# Patient Record
Sex: FEMALE | Race: BLACK OR AFRICAN AMERICAN | NOT HISPANIC OR LATINO | Employment: FULL TIME | URBAN - METROPOLITAN AREA
[De-identification: names, ages, dates, MRNs, and addresses within clinical notes are randomized per-mention and may not be internally consistent; named-entity substitution may affect disease eponyms.]

---

## 2017-09-21 ENCOUNTER — OFFICE VISIT (OUTPATIENT)
Dept: FAMILY MEDICINE | Facility: CLINIC | Age: 42
End: 2017-09-21
Payer: MEDICAID

## 2017-09-21 VITALS
HEIGHT: 68 IN | TEMPERATURE: 98 F | RESPIRATION RATE: 20 BRPM | DIASTOLIC BLOOD PRESSURE: 84 MMHG | WEIGHT: 247 LBS | SYSTOLIC BLOOD PRESSURE: 132 MMHG | HEART RATE: 80 BPM | BODY MASS INDEX: 37.44 KG/M2

## 2017-09-21 DIAGNOSIS — E66.9 OBESITY, CLASS II, BMI 35-39.9, ISOLATED: ICD-10-CM

## 2017-09-21 DIAGNOSIS — Z79.4 TYPE 2 DIABETES MELLITUS WITH HYPERGLYCEMIA, WITH LONG-TERM CURRENT USE OF INSULIN: Primary | ICD-10-CM

## 2017-09-21 DIAGNOSIS — E89.0 POSTABLATIVE HYPOTHYROIDISM: ICD-10-CM

## 2017-09-21 DIAGNOSIS — E11.65 TYPE 2 DIABETES MELLITUS WITH HYPERGLYCEMIA, WITH LONG-TERM CURRENT USE OF INSULIN: Primary | ICD-10-CM

## 2017-09-21 DIAGNOSIS — J30.89 CHRONIC NONSEASONAL ALLERGIC RHINITIS DUE TO POLLEN: ICD-10-CM

## 2017-09-21 DIAGNOSIS — R07.1 CHEST PAIN ON BREATHING: ICD-10-CM

## 2017-09-21 DIAGNOSIS — E11.42 DIABETIC POLYNEUROPATHY ASSOCIATED WITH TYPE 2 DIABETES MELLITUS: ICD-10-CM

## 2017-09-21 DIAGNOSIS — Z00.00 WELL ADULT EXAM: ICD-10-CM

## 2017-09-21 DIAGNOSIS — G57.11 MERALGIA PARESTHETICA OF RIGHT SIDE: ICD-10-CM

## 2017-09-21 DIAGNOSIS — F41.9 ANXIETY: ICD-10-CM

## 2017-09-21 DIAGNOSIS — I10 ESSENTIAL HYPERTENSION: ICD-10-CM

## 2017-09-21 PROBLEM — E66.812 OBESITY, CLASS II, BMI 35-39.9, ISOLATED: Status: ACTIVE | Noted: 2017-09-21

## 2017-09-21 PROCEDURE — 3008F BODY MASS INDEX DOCD: CPT | Mod: ,,, | Performed by: FAMILY MEDICINE

## 2017-09-21 PROCEDURE — 99386 PREV VISIT NEW AGE 40-64: CPT | Mod: 25,,, | Performed by: FAMILY MEDICINE

## 2017-09-21 RX ORDER — DICLOFENAC SODIUM 75 MG/1
75 TABLET, DELAYED RELEASE ORAL 2 TIMES DAILY
COMMUNITY
End: 2017-09-21 | Stop reason: ALTCHOICE

## 2017-09-21 RX ORDER — AMITRIPTYLINE HYDROCHLORIDE 25 MG/1
25 TABLET, FILM COATED ORAL NIGHTLY PRN
COMMUNITY
End: 2017-09-21 | Stop reason: ALTCHOICE

## 2017-09-21 RX ORDER — HYDROXYZINE HYDROCHLORIDE 25 MG/1
25 TABLET, FILM COATED ORAL 3 TIMES DAILY
COMMUNITY
End: 2017-10-30

## 2017-09-21 RX ORDER — GABAPENTIN 400 MG/1
400 CAPSULE ORAL 3 TIMES DAILY
COMMUNITY
End: 2017-09-21 | Stop reason: ALTCHOICE

## 2017-09-21 RX ORDER — FLUTICASONE PROPIONATE 50 MCG
1 SPRAY, SUSPENSION (ML) NASAL 2 TIMES DAILY PRN
Qty: 15 G | Refills: 11 | Status: SHIPPED | OUTPATIENT
Start: 2017-09-21 | End: 2018-06-11 | Stop reason: ALTCHOICE

## 2017-09-21 RX ORDER — METFORMIN HYDROCHLORIDE 500 MG/1
500 TABLET ORAL 2 TIMES DAILY WITH MEALS
Qty: 60 TABLET | Refills: 11 | Status: SHIPPED | OUTPATIENT
Start: 2017-09-21 | End: 2018-06-11 | Stop reason: SDUPTHER

## 2017-09-21 RX ORDER — LEVOTHYROXINE SODIUM 200 UG/1
200 TABLET ORAL DAILY
Qty: 30 TABLET | Refills: 11 | Status: SHIPPED | OUTPATIENT
Start: 2017-09-21 | End: 2018-06-11 | Stop reason: CLARIF

## 2017-09-21 RX ORDER — METFORMIN HYDROCHLORIDE 500 MG/1
500 TABLET ORAL 2 TIMES DAILY WITH MEALS
COMMUNITY
End: 2017-09-21 | Stop reason: SDUPTHER

## 2017-09-21 RX ORDER — MELOXICAM 7.5 MG/1
7.5 TABLET ORAL DAILY
Qty: 30 TABLET | Refills: 11 | Status: SHIPPED | OUTPATIENT
Start: 2017-09-21 | End: 2018-09-21

## 2017-09-21 NOTE — PROGRESS NOTES
Subjective:       Patient ID: Haydee Allen is a 42 y.o. female.    Chief Complaint: Annual Exam (BS-229) and Breast Pain    HPI  Review of Systems   Constitutional: Negative for activity change, appetite change, chills, diaphoresis, fatigue, fever and unexpected weight change.   HENT: Negative for congestion, dental problem, drooling, ear discharge, ear pain, facial swelling, hearing loss, mouth sores, nosebleeds, postnasal drip, rhinorrhea, sinus pressure, sneezing, sore throat, tinnitus, trouble swallowing and voice change.    Eyes: Negative for photophobia, pain, discharge, redness, itching and visual disturbance.   Respiratory: Negative for apnea, cough, choking, chest tightness, shortness of breath, wheezing and stridor.    Cardiovascular: Negative for chest pain, palpitations and leg swelling.   Gastrointestinal: Negative for abdominal distention, abdominal pain, anal bleeding, blood in stool, constipation, diarrhea, nausea, rectal pain and vomiting.   Endocrine: Negative for cold intolerance, heat intolerance, polydipsia, polyphagia and polyuria.   Genitourinary: Negative for decreased urine volume, difficulty urinating, dyspareunia, dysuria, enuresis, flank pain, frequency, genital sores, hematuria, menstrual problem, pelvic pain, urgency, vaginal bleeding, vaginal discharge and vaginal pain.   Musculoskeletal: Negative for arthralgias, back pain, gait problem, joint swelling, myalgias, neck pain and neck stiffness.   Skin: Negative for color change, pallor, rash and wound.   Allergic/Immunologic: Negative for environmental allergies, food allergies and immunocompromised state.   Neurological: Negative for dizziness, tremors, seizures, syncope, facial asymmetry, speech difficulty, weakness, light-headedness, numbness and headaches.   Hematological: Negative for adenopathy. Does not bruise/bleed easily.   Psychiatric/Behavioral: Negative for agitation, behavioral problems, confusion, decreased  concentration, dysphoric mood, hallucinations, self-injury, sleep disturbance and suicidal ideas. The patient is not nervous/anxious and is not hyperactive.        Objective:      Physical Exam    Assessment:       1. Type 2 diabetes mellitus with hyperglycemia, with long-term current use of insulin    2. Chest pain on breathing    3. Essential hypertension    4. Obesity, Class II, BMI 35-39.9, isolated    5. Well adult exam    6. Diabetic polyneuropathy associated with type 2 diabetes mellitus    7. Meralgia paresthetica of right side    8. Anxiety    9. Postablative hypothyroidism    10. Chronic nonseasonal allergic rhinitis due to pollen        Plan:       Haydee was seen today for annual exam and breast pain.    Diagnoses and all orders for this visit:    Type 2 diabetes mellitus with hyperglycemia, with long-term current use of insulin  -     metformin (GLUCOPHAGE) 500 MG tablet; Take 1 tablet (500 mg total) by mouth 2 (two) times daily with meals.  -     albiglutide (TANZEUM) 50 mg/0.5 mL PnIj; Inject 50 mg into the skin every 7 days.    Chest pain on breathing  -     EKG 12-lead  -     Ambulatory referral to Cardiology    Essential hypertension    Obesity, Class II, BMI 35-39.9, isolated    Well adult exam  -     Hemoglobin A1c; Future  -     Lipid panel; Future  -     Hepatic function panel; Future  -     Hepatitis C antibody; Future  -     Basic metabolic panel; Future  -     Hemoglobin A1c  -     Lipid panel  -     Hepatic function panel  -     Hepatitis C antibody  -     Basic metabolic panel    Diabetic polyneuropathy associated with type 2 diabetes mellitus    Meralgia paresthetica of right side  -     meloxicam (MOBIC) 7.5 MG tablet; Take 1 tablet (7.5 mg total) by mouth once daily.  -     CK; Future  -     CK    Anxiety    Postablative hypothyroidism  -     levothyroxine (SYNTHROID) 200 MCG tablet; Take 1 tablet (200 mcg total) by mouth once daily.    Chronic nonseasonal allergic rhinitis due to  pollen  -     fluticasone (FLONASE) 50 mcg/actuation nasal spray; 1 spray by Each Nare route 2 (two) times daily as needed for Rhinitis.         Return in about 3 months (around 12/21/2017).

## 2017-09-21 NOTE — PROGRESS NOTES
Subjective:       Patient ID: Haydee Allen is a 42 y.o. female.    Chief Complaint: Annual Exam (BS-229) and Breast Pain    Chest pain under left rib cage 4 months has seen a previous doctor who did not do extensive workup she says it is under the rib cage felt it was resulting of breath but had a mammogram in February that was negative.      Chest Pain    This is a new problem. The problem occurs 2 to 4 times per day. Progression since onset: fleeting. The pain is present in the lateral region (under left breast). The pain is at a severity of 5/10. Risk factors include obesity and stress (s/p PTL).   Her past medical history is significant for diabetes (poor controlover a year).   Pertinent negatives for past medical history include no mitral valve prolapse, no pacemaker and no PE.   Her family medical history is significant for CAD, diabetes and hypertension.   Pertinent negatives for family medical history include: no aortic dissection, no connective tissue disease, no heart disease, no hyperlipidemia, no Marfan's syndrome, no early MI, no PE, no PVD, no sickle cell disease, no stroke, no sudden death and no TIA. Prior workup: no w/u in past.     Review of Systems   Cardiovascular: Positive for chest pain.       Objective:      Physical Exam   Constitutional: She is oriented to person, place, and time. She appears well-developed and well-nourished. No distress.   HENT:   Head: Normocephalic and atraumatic.       Right Ear: External ear normal.   Left Ear: External ear normal.   Nose: Nose normal.       Mouth/Throat: Oropharynx is clear and moist. No oropharyngeal exudate.   Eyes: Conjunctivae and EOM are normal. Pupils are equal, round, and reactive to light. Right eye exhibits no discharge. Left eye exhibits no discharge. No scleral icterus.   Neck: Normal range of motion. Neck supple. No JVD present. No tracheal deviation present. No thyromegaly present.   Cardiovascular: Normal rate, regular rhythm, normal  heart sounds and intact distal pulses.  Exam reveals no gallop and no friction rub.    No murmur heard.  Pulmonary/Chest: Effort normal and breath sounds normal. No stridor. No respiratory distress. She has no wheezes. She has no rales. She exhibits no tenderness.   Abdominal: Soft. Bowel sounds are normal. She exhibits no distension and no mass. There is no tenderness. There is no rebound and no guarding. No hernia.   Musculoskeletal: Normal range of motion. She exhibits no edema, tenderness or deformity.   Lymphadenopathy:     She has no cervical adenopathy.   Neurological: She is alert and oriented to person, place, and time. She has normal reflexes. She displays normal reflexes. A sensory deficit (subjectivenumbness and pain the right thigh lateral femoral cutaneous nerve distribution) is present. No cranial nerve deficit. She exhibits normal muscle tone. Coordination normal.   Skin: Skin is warm and dry. Capillary refill takes less than 2 seconds. No rash noted. She is not diaphoretic. No erythema. No pallor.   Psychiatric: She has a normal mood and affect. Her behavior is normal. Judgment and thought content normal.       Assessment:       1. Type 2 diabetes mellitus with hyperglycemia, with long-term current use of insulin    2. Chest pain on breathing    3. Essential hypertension    4. Obesity, Class II, BMI 35-39.9, isolated    5. Well adult exam    6. Diabetic polyneuropathy associated with type 2 diabetes mellitus    7. Meralgia paresthetica of right side    8. Anxiety        Plan:       Haydee was seen today for annual exam and breast pain.    Diagnoses and all orders for this visit:    Type 2 diabetes mellitus with hyperglycemia, with long-term current use of insulin    Chest pain on breathing  -     EKG 12-lead  -     Ambulatory referral to Cardiology    Essential hypertension    Obesity, Class II, BMI 35-39.9, isolated    Well adult exam  -     Hemoglobin A1c; Future  -     Lipid panel; Future  -      Hepatic function panel; Future  -     Hepatitis C antibody; Future  -     Basic metabolic panel; Future  -     Hemoglobin A1c  -     Lipid panel  -     Hepatic function panel  -     Hepatitis C antibody  -     Basic metabolic panel    Diabetic polyneuropathy associated with type 2 diabetes mellitus    Meralgia paresthetica of right side  -     meloxicam (MOBIC) 7.5 MG tablet; Take 1 tablet (7.5 mg total) by mouth once daily.  -     CK; Future  -     CK    Anxiety         Return in about 3 months (around 12/21/2017).

## 2017-09-28 ENCOUNTER — TELEPHONE (OUTPATIENT)
Dept: FAMILY MEDICINE | Facility: CLINIC | Age: 42
End: 2017-09-28

## 2017-09-28 LAB
ALBUMIN SERPL-MCNC: 3.8 G/DL (ref 3.1–4.7)
ALP SERPL-CCNC: 68 IU/L (ref 40–104)
ALT (SGPT): 11 IU/L (ref 3–33)
AST SERPL-CCNC: 12 IU/L (ref 10–40)
BILIRUB SERPL-MCNC: 0.3 MG/DL (ref 0.3–1)
BILIRUBIN DIRECT+TOT PNL SERPL-MCNC: <0.1 MG/DL (ref 0–0.2)
BUN SERPL-MCNC: 10 MG/DL (ref 8–20)
CALCIUM SERPL-MCNC: 8.9 MG/DL (ref 7.7–10.4)
CHLORIDE: 99 MMOL/L (ref 98–110)
CK SERPL-CCNC: 60 IU/L (ref 13–135)
CO2 SERPL-SCNC: 25.2 MMOL/L (ref 22.8–31.6)
CREATININE: 0.64 MG/DL (ref 0.6–1.4)
GLUCOSE: 282 MG/DL (ref 70–99)
POTASSIUM SERPL-SCNC: 3.6 MMOL/L (ref 3.5–5)
PROT SERPL-MCNC: 7.7 G/DL (ref 6–8.2)
SODIUM: 135 MMOL/L (ref 134–144)

## 2017-09-28 NOTE — TELEPHONE ENCOUNTER
----- Message from David Redd MD sent at 9/28/2017 12:13 PM CDT -----  Blood sugar [id number]. Increase insulin to 30 units per day, and return to clinic with diary in 2 weeks

## 2017-09-29 LAB
HBA1C MFR BLD: 10.9 % (ref 3.1–6.5)
HCV AB SERPL QL IA: <0.1 S/CO RATIO (ref 0–0.9)

## 2017-10-30 ENCOUNTER — OFFICE VISIT (OUTPATIENT)
Dept: FAMILY MEDICINE | Facility: CLINIC | Age: 42
End: 2017-10-30
Payer: MEDICAID

## 2017-10-30 VITALS
HEART RATE: 88 BPM | HEIGHT: 68 IN | DIASTOLIC BLOOD PRESSURE: 80 MMHG | WEIGHT: 251 LBS | TEMPERATURE: 99 F | BODY MASS INDEX: 38.04 KG/M2 | SYSTOLIC BLOOD PRESSURE: 118 MMHG | RESPIRATION RATE: 16 BRPM

## 2017-10-30 DIAGNOSIS — N30.01 ACUTE CYSTITIS WITH HEMATURIA: ICD-10-CM

## 2017-10-30 DIAGNOSIS — G57.11 MERALGIA PARESTHETICA OF RIGHT SIDE: Primary | ICD-10-CM

## 2017-10-30 DIAGNOSIS — E11.42 DIABETIC POLYNEUROPATHY ASSOCIATED WITH TYPE 2 DIABETES MELLITUS: ICD-10-CM

## 2017-10-30 DIAGNOSIS — Z79.4 TYPE 2 DIABETES MELLITUS WITH HYPERGLYCEMIA, WITH LONG-TERM CURRENT USE OF INSULIN: ICD-10-CM

## 2017-10-30 DIAGNOSIS — E89.0 POSTABLATIVE HYPOTHYROIDISM: ICD-10-CM

## 2017-10-30 DIAGNOSIS — R10.11 RIGHT UPPER QUADRANT ABDOMINAL PAIN: ICD-10-CM

## 2017-10-30 DIAGNOSIS — E66.9 OBESITY, UNSPECIFIED CLASSIFICATION, UNSPECIFIED OBESITY TYPE, UNSPECIFIED WHETHER SERIOUS COMORBIDITY PRESENT: ICD-10-CM

## 2017-10-30 DIAGNOSIS — M54.31 SCIATICA OF RIGHT SIDE: ICD-10-CM

## 2017-10-30 DIAGNOSIS — E11.65 TYPE 2 DIABETES MELLITUS WITH HYPERGLYCEMIA, WITH LONG-TERM CURRENT USE OF INSULIN: ICD-10-CM

## 2017-10-30 PROBLEM — N30.00 ACUTE CYSTITIS: Status: ACTIVE | Noted: 2017-10-30

## 2017-10-30 LAB
BILIRUB SERPL-MCNC: ABNORMAL MG/DL
BLOOD URINE, POC: ABNORMAL
COLOR, POC UA: ABNORMAL
GLUCOSE UR QL STRIP: 100
KETONES UR QL STRIP: ABNORMAL
LEUKOCYTE ESTERASE URINE, POC: ABNORMAL
NITRITE, POC UA: ABNORMAL
PH, POC UA: 6.5
PROTEIN, POC: ABNORMAL
SPECIFIC GRAVITY, POC UA: 1.03
UROBILINOGEN, POC UA: 2

## 2017-10-30 PROCEDURE — 99214 OFFICE O/P EST MOD 30 MIN: CPT | Mod: 25,,, | Performed by: FAMILY MEDICINE

## 2017-10-30 PROCEDURE — 81001 URINALYSIS AUTO W/SCOPE: CPT | Mod: ,,, | Performed by: FAMILY MEDICINE

## 2017-10-30 RX ORDER — SULFAMETHOXAZOLE AND TRIMETHOPRIM 800; 160 MG/1; MG/1
1 TABLET ORAL 2 TIMES DAILY
Qty: 20 TABLET | Refills: 0 | Status: SHIPPED | OUTPATIENT
Start: 2017-10-30 | End: 2018-06-11 | Stop reason: ALTCHOICE

## 2017-10-30 RX ORDER — OMEPRAZOLE 40 MG/1
40 CAPSULE, DELAYED RELEASE ORAL DAILY
Qty: 90 CAPSULE | Refills: 3 | Status: SHIPPED | OUTPATIENT
Start: 2017-10-30 | End: 2018-06-11 | Stop reason: CLARIF

## 2017-10-30 RX ORDER — LEVOTHYROXINE SODIUM 300 UG/1
0.3 TABLET ORAL
Qty: 30 TABLET | Refills: 11 | Status: SHIPPED | OUTPATIENT
Start: 2017-10-30 | End: 2019-05-20

## 2017-10-30 NOTE — PROGRESS NOTES
Subjective:       Patient ID: Haydee Allen is a 42 y.o. female.    Chief Complaint: Abdominal Pain (blood in urine) and Leg Pain (right)    Abdominal Pain   This is a new problem. The current episode started yesterday. The onset quality is sudden (coliccy). The problem has been gradually worsening. The pain is located in the RUQ. The quality of the pain is colicky and sharp. The abdominal pain does not radiate. Associated symptoms include diarrhea, dysuria, a fever and flatus (with odor). Pertinent negatives include no belching, constipation, headaches, hematochezia, hematuria, melena, myalgias, nausea, vomiting or weight loss. The treatment provided no relief. There is no history of abdominal surgery, Crohn's disease, GERD, PUD or ulcerative colitis. Patient's medical history does not include UTI.   Leg Pain    Incident onset: x years.     Review of Systems   Constitutional: Positive for fever. Negative for weight loss.   Gastrointestinal: Positive for abdominal pain, diarrhea and flatus (with odor). Negative for constipation, hematochezia, melena, nausea and vomiting.   Genitourinary: Positive for dysuria. Negative for hematuria.   Musculoskeletal: Negative for myalgias.   Neurological: Negative for headaches.       Objective:      Physical Exam   Musculoskeletal:        Back:      Urine dipstick shows positive for protein, positive for glucose, positive for nitrates and positive for leukocytes.  Micro exam: not done.    Assessment:       1. Meralgia paresthetica of right side    2. Diabetic polyneuropathy associated with type 2 diabetes mellitus    3. Obesity, unspecified classification, unspecified obesity type, unspecified whether serious comorbidity present    4. Acute cystitis with hematuria    5. Sciatica of right side    6. Type 2 diabetes mellitus with hyperglycemia, with long-term current use of insulin    7. Postablative hypothyroidism    8. Right upper quadrant abdominal pain        Plan:        Haydee was seen today for abdominal pain and leg pain.    Diagnoses and all orders for this visit:    Meralgia paresthetica of right side  -     CT Lumbar Spine W Wo Contrast; Future    Diabetic polyneuropathy associated with type 2 diabetes mellitus    Obesity, unspecified classification, unspecified obesity type, unspecified whether serious comorbidity present    Acute cystitis with hematuria  -     POCT URINALYSIS, WITH MICRO, DIPSTICK OR TABLET REAGENT, AUTOMATED,  -     sulfamethoxazole-trimethoprim 800-160mg (BACTRIM DS) 800-160 mg Tab; Take 1 tablet by mouth 2 (two) times daily.    Sciatica of right side  -     CT Lumbar Spine W Wo Contrast; Future    Type 2 diabetes mellitus with hyperglycemia, with long-term current use of insulin    Postablative hypothyroidism    Right upper quadrant abdominal pain  -     US Abdomen Complete; Future    Other orders  -     levothyroxine (SYNTHROID) 300 MCG Tab; Take 1 tablet (300 mcg total) by mouth before breakfast.  -     omeprazole (PRILOSEC) 40 MG capsule; Take 1 capsule (40 mg total) by mouth once daily.         Return in about 4 weeks (around 11/27/2017).

## 2017-10-30 NOTE — PATIENT INSTRUCTIONS
"Increase her insulin  glargine to 30 units every pm  [place] Diet  Your healthcare provider may recommend a bland diet if you have an upset stomach. It consists of foods that are mild and easy to digest. It is better to eat small frequent meals rather than 3 large meals a day.    Beverages  OK: Fruit juices, non-caffeinated teas and coffee, non-carbonated ochoa  Avoid: Carbonated beverage, caffeinated tea and coffee, all alcoholic beverages  Bread  OK: Refined white, wheat or rye bread, nilam or soda crackers, Moran toast, plain rolls, bagels  Avoid: Whole-grain bread  Cereal  OK: Refined cereals: cooked or ready to eat  Avoid: Whole-grain cereals and granola, or those containing bran, seeds or nuts  Desserts  OK: Peanut butter and all others except those to "avoid"  Avoid: Chocolate, cocoa, coconut, popcorn, nuts, seeds, jam, marmalade  Fruits  OK: Canned, cooked, frozen or fresh fruits without seeds or tough skin  Avoid: Olives, skin and seeds of fruit  Meats  OK: All fresh or preserved meat, fish and fowl  Avoid: Any that are prepared with those spices to "avoid"  Cheese and eggs  OK: Eggs, cottage cheese, cream cheese, other cheeses  Avoid: All cheeses made with those spices to "avoid"  Potatoes and pasta  OK: Potato, rice, macaroni, noodles, spaghetti  Avoid: None  Soups  OK: All soups without heavy seasoning  Avoid: Soups made with those spices to "avoid"  Vegetables  OK: Canned, cooked, fresh or frozen mildly flavored vegetables without seeds, skins or coarse fiber  Avoid: Vegetables prepared with those spices to "avoid"; skin and seeds of vegetables and those with coarse fiber  Spices  OK: Salt, lemon and lime juice, vinegar, all extracts, mera, cinnamon, thyme, mace, allspice, paprika  Avoid: Chili powder, cloves, pepper, seed spices, garlic, gravy pickles, highly seasoned salad dressings  Date Last Reviewed: 11/20/2015  © 7993-2517 [employer]. [address] [place] [address], [place] [address]. All " rights reserved. This information is not intended as a substitute for professional medical care. Always follow your healthcare professional's instructions.

## 2017-10-31 ENCOUNTER — TELEPHONE (OUTPATIENT)
Dept: FAMILY MEDICINE | Facility: CLINIC | Age: 42
End: 2017-10-31

## 2017-10-31 DIAGNOSIS — K21.9 GASTROESOPHAGEAL REFLUX DISEASE WITHOUT ESOPHAGITIS: ICD-10-CM

## 2017-10-31 RX ORDER — LANSOPRAZOLE 30 MG/1
30 CAPSULE, DELAYED RELEASE ORAL DAILY
Qty: 30 CAPSULE | Refills: 11 | Status: SHIPPED | OUTPATIENT
Start: 2017-10-31 | End: 2017-11-01 | Stop reason: SDUPTHER

## 2017-10-31 NOTE — TELEPHONE ENCOUNTER
----- Message from Deepthi Ji MA sent at 10/31/2017  8:07 AM CDT -----  Pa request for Omerprazole was DENIED, scanned in patient chart

## 2017-11-01 DIAGNOSIS — K21.9 GASTROESOPHAGEAL REFLUX DISEASE WITHOUT ESOPHAGITIS: ICD-10-CM

## 2017-11-01 RX ORDER — LANSOPRAZOLE 30 MG/1
30 CAPSULE, DELAYED RELEASE ORAL DAILY
Qty: 30 CAPSULE | Refills: 11 | Status: SHIPPED | OUTPATIENT
Start: 2017-11-01 | End: 2018-06-11

## 2017-11-06 ENCOUNTER — TELEPHONE (OUTPATIENT)
Dept: FAMILY MEDICINE | Facility: CLINIC | Age: 42
End: 2017-11-06

## 2017-11-06 RX ORDER — ALPRAZOLAM 0.5 MG/1
0.5 TABLET ORAL
Qty: 1 TABLET | Refills: 1 | Status: SHIPPED | OUTPATIENT
Start: 2017-11-06 | End: 2018-06-11

## 2017-11-06 NOTE — TELEPHONE ENCOUNTER
----- Message from David Redd MD sent at 11/6/2017  8:13 AM CST -----  Abdominal ultrasound is negative. Low-fat low-cholesterol diet and recheck liver functions in 3 months

## 2017-11-06 NOTE — TELEPHONE ENCOUNTER
Patient stated she can't do the CT Scan without anxiety meds.   She rescheduled please call patient in something to take before CT

## 2017-12-11 DIAGNOSIS — M25.561 RIGHT KNEE PAIN, UNSPECIFIED CHRONICITY: Primary | ICD-10-CM

## 2017-12-12 ENCOUNTER — HOSPITAL ENCOUNTER (OUTPATIENT)
Dept: RADIOLOGY | Facility: HOSPITAL | Age: 42
Discharge: HOME | End: 2017-12-12
Attending: ORTHOPAEDIC SURGERY
Payer: COMMERCIAL

## 2017-12-12 ENCOUNTER — OFFICE VISIT (OUTPATIENT)
Dept: ORTHOPEDICS | Facility: CLINIC | Age: 42
End: 2017-12-12
Payer: COMMERCIAL

## 2017-12-12 VITALS
SYSTOLIC BLOOD PRESSURE: 130 MMHG | HEIGHT: 66 IN | BODY MASS INDEX: 40.82 KG/M2 | WEIGHT: 254 LBS | DIASTOLIC BLOOD PRESSURE: 83 MMHG | HEART RATE: 78 BPM

## 2017-12-12 DIAGNOSIS — M70.61 GREATER TROCHANTERIC BURSITIS OF RIGHT HIP: Primary | ICD-10-CM

## 2017-12-12 DIAGNOSIS — M70.61 GREATER TROCHANTERIC BURSITIS, RIGHT: Primary | ICD-10-CM

## 2017-12-12 DIAGNOSIS — M25.561 RIGHT KNEE PAIN, UNSPECIFIED CHRONICITY: ICD-10-CM

## 2017-12-12 DIAGNOSIS — M79.604 RIGHT LEG PAIN: ICD-10-CM

## 2017-12-12 PROCEDURE — 73502 X-RAY EXAM HIP UNI 2-3 VIEWS: CPT | Mod: TC,PN,RT

## 2017-12-12 PROCEDURE — 20610 DRAIN/INJ JOINT/BURSA W/O US: CPT | Mod: RT,S$GLB,, | Performed by: ORTHOPAEDIC SURGERY

## 2017-12-12 PROCEDURE — 99203 OFFICE O/P NEW LOW 30 MIN: CPT | Mod: 25,S$GLB,, | Performed by: ORTHOPAEDIC SURGERY

## 2017-12-12 PROCEDURE — 73502 X-RAY EXAM HIP UNI 2-3 VIEWS: CPT | Mod: 26,RT,, | Performed by: RADIOLOGY

## 2017-12-12 PROCEDURE — 99999 PR PBB SHADOW E&M-EST. PATIENT-LVL III: CPT | Mod: PBBFAC,,, | Performed by: ORTHOPAEDIC SURGERY

## 2017-12-12 RX ADMIN — TRIAMCINOLONE ACETONIDE 40 MG: 40 INJECTION, SUSPENSION INTRA-ARTICULAR; INTRAMUSCULAR at 05:12

## 2017-12-19 RX ORDER — TRIAMCINOLONE ACETONIDE 40 MG/ML
40 INJECTION, SUSPENSION INTRA-ARTICULAR; INTRAMUSCULAR
Status: DISCONTINUED | OUTPATIENT
Start: 2017-12-12 | End: 2017-12-19 | Stop reason: HOSPADM

## 2017-12-19 NOTE — PROCEDURES
Large Joint Aspiration/Injection  Date/Time: 12/12/2017 5:45 PM  Performed by: CHELLE JEFFREY  Authorized by: CHELLE JEFFREY     Consent Done?:  Yes (Verbal)  Indications:  Pain  Timeout: Prior to procedure the correct patient, procedure, and site was verified      Location:  Hip  Site:  R greater trochanteric bursa  Prep: Patient was prepped and draped in usual sterile fashion    Approach:  Lateral  Medications:  40 mg triamcinolone acetonide 40 mg/mL

## 2017-12-25 PROBLEM — Z00.00 WELL ADULT EXAM: Status: RESOLVED | Noted: 2017-09-21 | Resolved: 2017-12-25

## 2018-02-28 DIAGNOSIS — M25.561 RIGHT KNEE PAIN, UNSPECIFIED CHRONICITY: Primary | ICD-10-CM

## 2018-03-07 ENCOUNTER — TELEPHONE (OUTPATIENT)
Dept: FAMILY MEDICINE | Facility: CLINIC | Age: 43
End: 2018-03-07

## 2018-03-07 NOTE — TELEPHONE ENCOUNTER
Current alternatives are noted we'll change her to true is that he and have her return to clinic with her blood sugar diaryin 3 months. To return sooner if her sugars are running over 200

## 2018-06-11 ENCOUNTER — OFFICE VISIT (OUTPATIENT)
Dept: FAMILY MEDICINE | Facility: CLINIC | Age: 43
End: 2018-06-11
Payer: COMMERCIAL

## 2018-06-11 VITALS
DIASTOLIC BLOOD PRESSURE: 82 MMHG | BODY MASS INDEX: 39.37 KG/M2 | HEART RATE: 95 BPM | HEIGHT: 66 IN | WEIGHT: 245 LBS | OXYGEN SATURATION: 98 % | SYSTOLIC BLOOD PRESSURE: 120 MMHG

## 2018-06-11 DIAGNOSIS — I10 ESSENTIAL HYPERTENSION: ICD-10-CM

## 2018-06-11 DIAGNOSIS — E11.9 TYPE 2 DIABETES MELLITUS WITHOUT COMPLICATION, WITH LONG-TERM CURRENT USE OF INSULIN: Primary | ICD-10-CM

## 2018-06-11 DIAGNOSIS — Z79.4 TYPE 2 DIABETES MELLITUS WITHOUT COMPLICATION, WITH LONG-TERM CURRENT USE OF INSULIN: Primary | ICD-10-CM

## 2018-06-11 DIAGNOSIS — K21.9 GASTROESOPHAGEAL REFLUX DISEASE, ESOPHAGITIS PRESENCE NOT SPECIFIED: ICD-10-CM

## 2018-06-11 DIAGNOSIS — E03.9 ACQUIRED HYPOTHYROIDISM: ICD-10-CM

## 2018-06-11 PROCEDURE — 99214 OFFICE O/P EST MOD 30 MIN: CPT | Mod: ,,, | Performed by: NURSE PRACTITIONER

## 2018-06-11 PROCEDURE — 3079F DIAST BP 80-89 MM HG: CPT | Mod: ,,, | Performed by: NURSE PRACTITIONER

## 2018-06-11 PROCEDURE — 3074F SYST BP LT 130 MM HG: CPT | Mod: ,,, | Performed by: NURSE PRACTITIONER

## 2018-06-11 PROCEDURE — 3046F HEMOGLOBIN A1C LEVEL >9.0%: CPT | Mod: ,,, | Performed by: NURSE PRACTITIONER

## 2018-06-11 PROCEDURE — 3008F BODY MASS INDEX DOCD: CPT | Mod: ,,, | Performed by: NURSE PRACTITIONER

## 2018-06-11 RX ORDER — HYDROXYZINE HYDROCHLORIDE 10 MG/1
25 TABLET, FILM COATED ORAL 3 TIMES DAILY PRN
COMMUNITY
End: 2019-04-26

## 2018-06-11 RX ORDER — PANTOPRAZOLE SODIUM 20 MG/1
20 TABLET, DELAYED RELEASE ORAL
Start: 2018-06-11 | End: 2019-04-26

## 2018-06-11 RX ORDER — METFORMIN HYDROCHLORIDE 1000 MG/1
1000 TABLET ORAL 2 TIMES DAILY WITH MEALS
Qty: 180 TABLET | Refills: 0 | Status: SHIPPED | OUTPATIENT
Start: 2018-06-11 | End: 2019-01-08 | Stop reason: SDUPTHER

## 2018-06-11 RX ORDER — PEN NEEDLE, DIABETIC 30 GX3/16"
NEEDLE, DISPOSABLE MISCELLANEOUS
COMMUNITY
End: 2020-02-10 | Stop reason: SDUPTHER

## 2018-06-11 RX ORDER — PANTOPRAZOLE SODIUM 20 MG/1
20 TABLET, DELAYED RELEASE ORAL
COMMUNITY
End: 2018-06-11 | Stop reason: SDUPTHER

## 2018-06-11 RX ORDER — LISINOPRIL 10 MG/1
10 TABLET ORAL DAILY
Qty: 90 TABLET | Refills: 0 | Status: SHIPPED | OUTPATIENT
Start: 2018-06-11 | End: 2018-10-24 | Stop reason: SDUPTHER

## 2018-06-11 NOTE — PATIENT INSTRUCTIONS
4 Steps for Eating Healthier  Changing the way you eat can improve your health. It can lower your cholesterol and blood pressure, and help you stay at a healthy weight. Your diet doesnt have to be bland and boring to be healthy. Just watch your calories and follow these steps:    1. Eat fewer unhealthy fats  · Choose more fish and lean meats instead of fatty cuts of meat.  · Skip butter and lard, and use less margarine.  · Pass on foods that have palm, coconut, or hydrogenated oils.  · Eat fewer high-fat dairy foods like cheese, ice cream, and whole milk.  · Get a heart-healthy cookbook and try some low-fat recipes.  2. Go light on salt  · Keep the saltshaker off the table.  · Limit high-salt ingredients, such as soy sauce, bouillon, and garlic salt.  · Instead of adding salt when cooking, season your food with herbs and flavorings. Try lemon, garlic, and onion.  · Limit convenience foods, such as boxed or canned foods and restaurant food.  · Read food labels and choose lower-sodium options.  3. Limit sugar  · Pause before you add sugars to pancakes, cereal, coffee, or tea. This includes white and brown table sugar, syrup, honey, and molasses. Cut your usual amount by half.  · Use non-sugar sweeteners. Stevia, aspartame, and sucralose can satisfy a sweet tooth without adding calories.  · Swap out sugar-filled soda and other drinks. Buy sugar-free or low-calorie beverages. Remember water is always the best choice.  · Read labels and choose foods with less added sugar. Keep in mind that dairy foods and foods with fruit will have some natural sugar.  · Cut the sugar in recipes by 1/3 to 1/2. Boost the flavor with extracts like almond, vanilla, or orange. Or add spices such as cinnamon or nutmeg.  4. Eat more fiber  · Eat fresh fruits and vegetables every day.  · Boost your diet with whole grains. Go for oats, whole-grain rice, and bran.  · Add beans and lentils to your meals.  · Drink more water to match your fiber  increase. This is to help prevent constipation.  Date Last Reviewed: 5/11/2015  © 2956-1180 [employer]. [address] [place] [address], [place] [address]. All rights reserved. This information is not intended as a substitute for professional medical care. Always follow your healthcare professional's instructions.

## 2018-06-11 NOTE — PROGRESS NOTES
SUBJECTIVE:      Patient ID: Haydee Allen is a 43 y.o. female.    Chief Complaint: Diabetes (sugars running 200-300)    Patient is here to establish care with PCP. She is a type 2 diabetic on insulin for several years. States she has never really been controlled, does not follow a proper diet and does not exercise regularly. She takes her medications as prescribed, her average blood glucose is 200-300 fasting  HTN: needs refill on lisinopril, has been out for 3+ months    Diabetes   She presents for her initial diabetic visit. She has type 2 diabetes mellitus. Her disease course has been worsening. There are no hypoglycemic associated symptoms. Pertinent negatives for hypoglycemia include no confusion, pallor or speech difficulty. Associated symptoms include foot paresthesias. Pertinent negatives for diabetes include no chest pain, no fatigue, no visual change, no weakness and no weight loss. There are no hypoglycemic complications. There are no diabetic complications. Current diabetic treatment includes oral agent (monotherapy) and insulin injections. Her weight is stable. She is following a generally unhealthy diet. When asked about meal planning, she reported none. She has not had a previous visit with a dietitian. She rarely participates in exercise. Her breakfast blood glucose is taken between 8-9 am. Her breakfast blood glucose range is generally >200 mg/dl. She does not see a podiatrist.Eye exam is current.   Hypertension   This is a chronic problem. The current episode started more than 1 year ago. The problem is unchanged. The problem is controlled. Pertinent negatives include no chest pain. Risk factors for coronary artery disease include obesity and diabetes mellitus. Past treatments include ACE inhibitors and diuretics. Compliance problems include diet and exercise.  Identifiable causes of hypertension include a thyroid problem.   Thyroid Problem   Presents for initial visit. Patient reports no  cold intolerance, constipation, diaphoresis, dry skin, fatigue, heat intolerance, leg swelling, visual change or weight loss. The symptoms have been stable. Past treatments include levothyroxine. The treatment provided significant relief. Prior procedures include thyroidectomy. Her past medical history is significant for diabetes and obesity.   Gastroesophageal Reflux   She reports no chest pain. This is a chronic problem. The current episode started more than 1 year ago. The problem occurs occasionally. The problem has been unchanged. The symptoms are aggravated by ETOH and certain foods. Pertinent negatives include no fatigue or weight loss. She has tried a PPI for the symptoms. The treatment provided significant relief. Past procedures include an EGD.       Past Surgical History:   Procedure Laterality Date     SECTION      THYROIDECTOMY, PARTIAL      TUBAL LIGATION       Family History   Problem Relation Age of Onset    Cancer Mother     Hypertension Mother     Diabetes Mother     Arthritis Mother     Heart disease Mother     Kidney disease Mother     Cancer Father       Social History     Social History    Marital status: Single     Spouse name: N/A    Number of children: N/A    Years of education: N/A     Social History Main Topics    Smoking status: Never Smoker    Smokeless tobacco: Never Used    Alcohol use No    Drug use: No    Sexual activity: No     Other Topics Concern    None     Social History Narrative    None     Current Outpatient Prescriptions   Medication Sig Dispense Refill    blood sugar diagnostic (TRUE METRIX GLUCOSE TEST STRIP MISC) True Metrix Glucose Test Strip   Use twice daily as directed      hydrOXYzine HCl (ATARAX) 10 MG Tab Take 25 mg by mouth 3 (three) times daily as needed.      insulin degludec-liraglutide (XULTOPHY 100/3.6) 100 unit-3.6 mg /mL (3 mL) InPn Inject 16 Units into the skin once daily. 3 mL 2    levothyroxine (SYNTHROID) 300 MCG Tab Take  "1 tablet (300 mcg total) by mouth before breakfast. 30 tablet 11    lisinopril 10 MG tablet Take 1 tablet (10 mg total) by mouth once daily. 90 tablet 0    meloxicam (MOBIC) 7.5 MG tablet Take 1 tablet (7.5 mg total) by mouth once daily. 30 tablet 11    metFORMIN (GLUCOPHAGE) 1000 MG tablet Take 1 tablet (1,000 mg total) by mouth 2 (two) times daily with meals. 180 tablet 0    pantoprazole (PROTONIX) 20 MG tablet Take 1 tablet (20 mg total) by mouth 2 (two) times daily before meals.      pen needle, diabetic (BD ULTRA-FINE SHORT PEN NEEDLE) 31 gauge x 5/16" Ndle BD Ultra-Fine Short Pen Needle 31 gauge x 5/16"   Use daily with toujeo       No current facility-administered medications for this visit.      Review of patient's allergies indicates:  No Known Allergies   Past Medical History:   Diagnosis Date    Anemia     Anxiety     Arthritis     Diabetes mellitus     Diabetes mellitus, type 2     Encounter for blood transfusion     2    Hypertension     Obesity     Thyroid disease      Past Surgical History:   Procedure Laterality Date     SECTION      THYROIDECTOMY, PARTIAL      TUBAL LIGATION         Review of Systems   Constitutional: Negative for appetite change, chills, diaphoresis, fatigue, unexpected weight change and weight loss.   HENT: Negative for ear discharge, hearing loss, trouble swallowing and voice change.    Eyes: Negative for photophobia and pain.   Respiratory: Negative for chest tightness and stridor.    Cardiovascular: Negative for chest pain.   Gastrointestinal: Negative for blood in stool, constipation and vomiting.   Endocrine: Negative for cold intolerance and heat intolerance.   Genitourinary: Negative for difficulty urinating and flank pain.   Musculoskeletal: Negative for joint swelling and neck stiffness.   Skin: Negative for pallor.   Neurological: Negative for speech difficulty and weakness.   Hematological: Does not bruise/bleed easily.   Psychiatric/Behavioral: " "Negative for confusion.      OBJECTIVE:      Vitals:    06/11/18 1032   BP: 120/82   Pulse: 95   SpO2: 98%   Weight: 111.1 kg (245 lb)   Height: 5' 6" (1.676 m)     Physical Exam   Constitutional: She is oriented to person, place, and time. She appears well-developed and well-nourished.   HENT:   Head: Atraumatic.   Eyes: Conjunctivae are normal.   Neck: Neck supple.   Cardiovascular: Normal rate, regular rhythm, normal heart sounds and intact distal pulses.    Pulmonary/Chest: Effort normal and breath sounds normal.   Abdominal: Soft. Bowel sounds are normal. She exhibits no distension.   Musculoskeletal: Normal range of motion.        Right foot: There is no deformity.        Left foot: There is no deformity.   Feet:   Right Foot:   Protective Sensation: 10 sites tested. 10 sites sensed.   Skin Integrity: Negative for ulcer, blister or skin breakdown.   Left Foot:   Protective Sensation: 10 sites tested. 10 sites sensed.   Skin Integrity: Negative for ulcer, blister or skin breakdown.   Neurological: She is alert and oriented to person, place, and time.   Skin: Skin is warm and dry.   Psychiatric: She has a normal mood and affect.   Nursing note and vitals reviewed.     Assessment:       1. Type 2 diabetes mellitus without complication, with long-term current use of insulin    2. Acquired hypothyroidism    3. Gastroesophageal reflux disease, esophagitis presence not specified    4. Essential hypertension        Plan:       Type 2 diabetes mellitus without complication, with long-term current use of insulin  -     Comprehensive metabolic panel; Future; Expected date: 06/11/2018  -     Microalbumin/creatinine urine ratio; Future; Expected date: 06/11/2018  -     Urinalysis; Future; Expected date: 06/11/2018  -     Hemoglobin A1C, POCT; Future                       A1c 10.5    -  Increase:  metFORMIN (GLUCOPHAGE) 1000 MG tablet; Take 1 tablet (1,000 mg total) by mouth 2 (two) times daily with meals.  Dispense: 180 " tablet; Refill: 0  -     Ambulatory referral to Nutrition Services; Future  -   Start: insulin degludec-liraglutide (XULTOPHY 100/3.6) 100 unit-3.6 mg /mL (3 mL) InPn; Inject 16 Units into the skin once daily.  Dispense: 3 mL; Refill: 2  Patient instructed to stop trulicity and basaglar. Will start Xultophy at 16 units and increase 2 units every week for fasting blood sugars >150.     Acquired hypothyroidism  -     Lipid panel; Future; Expected date: 06/11/2018  -     TSH; Future; Expected date: 06/11/2018  -     CBC auto differential; Future; Expected date: 06/11/2018    Gastroesophageal reflux disease, esophagitis presence not specified  -     pantoprazole (PROTONIX) 20 MG tablet; Take 1 tablet (20 mg total) by mouth 2 (two) times daily before meals.    Essential hypertension  -   start  lisinopril 10 MG tablet; Take 1 tablet (10 mg total) by mouth once daily.  Dispense: 90 tablet; Refill: 0        Follow-up in about 2 months (around 8/11/2018) for DM.      6/11/2018 DIANA Hernandes, FNP

## 2018-06-17 LAB
ALBUMIN SERPL-MCNC: 4.1 G/DL (ref 3.5–5.5)
ALBUMIN/CREAT UR: 19.1 MG/G CREAT (ref 0–30)
ALBUMIN/GLOB SERPL: 1.5 {RATIO} (ref 1.2–2.2)
ALP SERPL-CCNC: 65 IU/L (ref 39–117)
ALT SERPL-CCNC: 10 IU/L (ref 0–32)
APPEARANCE UR: ABNORMAL
AST SERPL-CCNC: 15 IU/L (ref 0–40)
BASOPHILS # BLD AUTO: 0.1 X10E3/UL (ref 0–0.2)
BASOPHILS NFR BLD AUTO: 1 %
BILIRUB SERPL-MCNC: <0.2 MG/DL (ref 0–1.2)
BILIRUB UR QL STRIP: NEGATIVE
BUN SERPL-MCNC: 12 MG/DL (ref 6–24)
BUN/CREAT SERPL: 19 (ref 9–23)
CALCIUM SERPL-MCNC: 8.8 MG/DL (ref 8.7–10.2)
CHLORIDE SERPL-SCNC: 101 MMOL/L (ref 96–106)
CHOLEST SERPL-MCNC: 131 MG/DL (ref 100–199)
CO2 SERPL-SCNC: 21 MMOL/L (ref 20–29)
COLOR UR: YELLOW
CREAT SERPL-MCNC: 0.63 MG/DL (ref 0.57–1)
CREAT UR-MCNC: 201.3 MG/DL
EOSINOPHIL # BLD AUTO: 0.3 X10E3/UL (ref 0–0.4)
EOSINOPHIL NFR BLD AUTO: 4 %
ERYTHROCYTE [DISTWIDTH] IN BLOOD BY AUTOMATED COUNT: 19 % (ref 12.3–15.4)
GFR SERPLBLD CREATININE-BSD FMLA CKD-EPI: 109 ML/MIN/1.73
GFR SERPLBLD CREATININE-BSD FMLA CKD-EPI: 125 ML/MIN/1.73
GLOBULIN SER CALC-MCNC: 2.7 G/DL (ref 1.5–4.5)
GLUCOSE SERPL-MCNC: 180 MG/DL (ref 65–99)
GLUCOSE UR QL: ABNORMAL
HCT VFR BLD AUTO: 28.3 % (ref 34–46.6)
HDLC SERPL-MCNC: 33 MG/DL
HGB BLD-MCNC: 7.5 G/DL (ref 11.1–15.9)
HGB UR QL STRIP: NEGATIVE
IMM GRANULOCYTES # BLD: 0 X10E3/UL (ref 0–0.1)
IMM GRANULOCYTES NFR BLD: 0 %
KETONES UR QL STRIP: ABNORMAL
LDLC SERPL CALC-MCNC: 80 MG/DL (ref 0–99)
LEUKOCYTE ESTERASE UR QL STRIP: NEGATIVE
LYMPHOCYTES # BLD AUTO: 2.3 X10E3/UL (ref 0.7–3.1)
LYMPHOCYTES NFR BLD AUTO: 27 %
MCH RBC QN AUTO: 17 PG (ref 26.6–33)
MCHC RBC AUTO-ENTMCNC: 26.5 G/DL (ref 31.5–35.7)
MCV RBC AUTO: 64 FL (ref 79–97)
MICRO URNS: ABNORMAL
MICROALBUMIN UR-MCNC: 38.5 UG/ML
MONOCYTES # BLD AUTO: 0.5 X10E3/UL (ref 0.1–0.9)
MONOCYTES NFR BLD AUTO: 6 %
NEUTROPHILS # BLD AUTO: 5.2 X10E3/UL (ref 1.4–7)
NEUTROPHILS NFR BLD AUTO: 62 %
NITRITE UR QL STRIP: NEGATIVE
PH UR STRIP: 5.5 [PH] (ref 5–7.5)
PLATELET # BLD AUTO: 323 X10E3/UL (ref 150–379)
POTASSIUM SERPL-SCNC: 4.3 MMOL/L (ref 3.5–5.2)
PROT SERPL-MCNC: 6.8 G/DL (ref 6–8.5)
PROT UR QL STRIP: ABNORMAL
RBC # BLD AUTO: 4.4 X10E6/UL (ref 3.77–5.28)
SODIUM SERPL-SCNC: 138 MMOL/L (ref 134–144)
SP GR UR: >=1.03 (ref 1–1.03)
TRIGL SERPL-MCNC: 92 MG/DL (ref 0–149)
TSH SERPL DL<=0.005 MIU/L-ACNC: 0.36 UIU/ML (ref 0.45–4.5)
UROBILINOGEN UR STRIP-MCNC: 1 MG/DL (ref 0.2–1)
VLDLC SERPL CALC-MCNC: 18 MG/DL (ref 5–40)
WBC # BLD AUTO: 8.3 X10E3/UL (ref 3.4–10.8)

## 2018-06-18 ENCOUNTER — TELEPHONE (OUTPATIENT)
Dept: FAMILY MEDICINE | Facility: CLINIC | Age: 43
End: 2018-06-18

## 2018-06-18 NOTE — TELEPHONE ENCOUNTER
----- Message from Dalton Navas NP sent at 6/18/2018  8:08 AM CDT -----  Samantha I called pt this morning with no answer. Her H&H is low and she need to go to the ER for a transfusion

## 2018-06-18 NOTE — TELEPHONE ENCOUNTER
Pt notified. Instructed to go to er for transfusion. Faxing over labs to [facility] ER.     Pt did start xultophy

## 2018-07-25 ENCOUNTER — TELEPHONE (OUTPATIENT)
Dept: FAMILY MEDICINE | Facility: CLINIC | Age: 43
End: 2018-07-25

## 2018-07-25 NOTE — TELEPHONE ENCOUNTER
"----- Message from Carrie Dent sent at 2018  2:13 PM CDT -----  Per [facility] nurse "Haydee Allen  1975 has an appt with NP Dalton Navas on 18 and attributed clinician is Dr. David Redd.  One of the [facility] nurses spoke to member and she states her blood sugar was 540 this morning and that her PCP was aware that blood sugars run high.  Please let me know that you have received this messaged and shared the information with the PCP."      "

## 2018-08-31 ENCOUNTER — OFFICE VISIT (OUTPATIENT)
Dept: FAMILY MEDICINE | Facility: CLINIC | Age: 43
End: 2018-08-31
Payer: COMMERCIAL

## 2018-08-31 VITALS
SYSTOLIC BLOOD PRESSURE: 130 MMHG | WEIGHT: 239 LBS | OXYGEN SATURATION: 98 % | HEIGHT: 66 IN | HEART RATE: 75 BPM | DIASTOLIC BLOOD PRESSURE: 80 MMHG | TEMPERATURE: 98 F | BODY MASS INDEX: 38.41 KG/M2

## 2018-08-31 DIAGNOSIS — B00.1 FEVER BLISTER: ICD-10-CM

## 2018-08-31 DIAGNOSIS — D64.9 ANEMIA, UNSPECIFIED TYPE: ICD-10-CM

## 2018-08-31 DIAGNOSIS — E11.9 TYPE 2 DIABETES MELLITUS WITHOUT COMPLICATION, WITH LONG-TERM CURRENT USE OF INSULIN: ICD-10-CM

## 2018-08-31 DIAGNOSIS — Z79.4 TYPE 2 DIABETES MELLITUS WITHOUT COMPLICATION, WITH LONG-TERM CURRENT USE OF INSULIN: ICD-10-CM

## 2018-08-31 DIAGNOSIS — E03.9 HYPOTHYROIDISM, UNSPECIFIED TYPE: ICD-10-CM

## 2018-08-31 DIAGNOSIS — I88.9 SUBMENTAL LYMPHADENITIS: Primary | ICD-10-CM

## 2018-08-31 PROCEDURE — 3008F BODY MASS INDEX DOCD: CPT | Mod: ,,, | Performed by: NURSE PRACTITIONER

## 2018-08-31 PROCEDURE — 3075F SYST BP GE 130 - 139MM HG: CPT | Mod: ,,, | Performed by: NURSE PRACTITIONER

## 2018-08-31 PROCEDURE — 99214 OFFICE O/P EST MOD 30 MIN: CPT | Mod: ,,, | Performed by: NURSE PRACTITIONER

## 2018-08-31 PROCEDURE — 3079F DIAST BP 80-89 MM HG: CPT | Mod: ,,, | Performed by: NURSE PRACTITIONER

## 2018-08-31 PROCEDURE — 3046F HEMOGLOBIN A1C LEVEL >9.0%: CPT | Mod: ,,, | Performed by: NURSE PRACTITIONER

## 2018-08-31 RX ORDER — VALACYCLOVIR HYDROCHLORIDE 1 G/1
1000 TABLET, FILM COATED ORAL EVERY 12 HOURS
Qty: 20 TABLET | Refills: 0 | Status: SHIPPED | OUTPATIENT
Start: 2018-08-31 | End: 2019-04-26

## 2018-08-31 RX ORDER — FERROUS SULFATE 325(65) MG
325 TABLET ORAL 2 TIMES DAILY
Qty: 60 TABLET | Refills: 3 | Status: SHIPPED | OUTPATIENT
Start: 2018-08-31 | End: 2019-05-20 | Stop reason: SDUPTHER

## 2018-08-31 RX ORDER — AMOXICILLIN AND CLAVULANATE POTASSIUM 875; 125 MG/1; MG/1
1 TABLET, FILM COATED ORAL 2 TIMES DAILY
Qty: 20 TABLET | Refills: 0 | Status: SHIPPED | OUTPATIENT
Start: 2018-08-31 | End: 2018-09-19 | Stop reason: ALTCHOICE

## 2018-08-31 NOTE — PROGRESS NOTES
SUBJECTIVE:      Patient ID: Haydee Allen is a 43 y.o. female.    Chief Complaint: Lymphadenopathy (under chin and rt side of face) and fever blister (lip)    Patient is here today complaining of a fever blister on her upper and lower left side lip. Painful and oozing. Yesterday she started with a painful know under her chin and right jaw  Diabetes: pt in on xulopthy 16 units. She has not been titrating up on the dose, average blood sugar is 150-200  Anemia: pt had blood transfusion in June, not taking iron supplements      Facial Pain   This is a new problem. The current episode started in the past 7 days. The problem occurs daily. The problem has been unchanged. Associated symptoms include swollen glands. Pertinent negatives include no abdominal pain, chest pain, chills, congestion, coughing, diaphoresis, fatigue, fever, headaches, joint swelling, rash, sore throat, vertigo, visual change, vomiting or weakness. She has tried nothing for the symptoms.   Diabetes   She presents for her follow-up diabetic visit. She has type 2 diabetes mellitus. There are no hypoglycemic associated symptoms. Pertinent negatives for hypoglycemia include no confusion, dizziness, headaches, pallor or speech difficulty. Pertinent negatives for diabetes include no chest pain, no fatigue, no visual change and no weakness. There are no hypoglycemic complications. Symptoms are stable. Risk factors for coronary artery disease include obesity. Current diabetic treatment includes insulin injections and oral agent (monotherapy). Her weight is decreasing steadily. She is following a generally unhealthy diet. When asked about meal planning, she reported none. Her breakfast blood glucose is taken between 7-8 am. Her breakfast blood glucose range is generally 140-180 mg/dl.   Thyroid Problem   Presents for follow-up visit. Patient reports no cold intolerance, constipation, diaphoresis, diarrhea, fatigue, heat intolerance, palpitations or  visual change. The symptoms have been stable.   Anemia   Presents for follow-up visit. There has been no abdominal pain, bruising/bleeding easily, confusion, fever, light-headedness, pallor or palpitations. Signs of blood loss that are not present include hematochezia. Procedure history includes colonoscopy and EGD.       Past Surgical History:   Procedure Laterality Date     SECTION      THYROIDECTOMY, PARTIAL      TUBAL LIGATION       Family History   Problem Relation Age of Onset    Cancer Mother     Hypertension Mother     Diabetes Mother     Arthritis Mother     Heart disease Mother     Kidney disease Mother     Cancer Father       Social History     Socioeconomic History    Marital status: Single     Spouse name: None    Number of children: None    Years of education: None    Highest education level: None   Social Needs    Financial resource strain: None    Food insecurity - worry: None    Food insecurity - inability: None    Transportation needs - medical: None    Transportation needs - non-medical: None   Occupational History    None   Tobacco Use    Smoking status: Never Smoker    Smokeless tobacco: Never Used   Substance and Sexual Activity    Alcohol use: No    Drug use: No    Sexual activity: No   Other Topics Concern    None   Social History Narrative    None     Current Outpatient Medications   Medication Sig Dispense Refill    blood sugar diagnostic (TRUE METRIX GLUCOSE TEST STRIP MISC) True Metrix Glucose Test Strip   Use twice daily as directed      hydrOXYzine HCl (ATARAX) 10 MG Tab Take 25 mg by mouth 3 (three) times daily as needed.      insulin degludec-liraglutide (XULTOPHY 100/3.6) 100 unit-3.6 mg /mL (3 mL) InPn Inject 16 Units into the skin once daily. 3 mL 2    levothyroxine (SYNTHROID) 300 MCG Tab Take 1 tablet (300 mcg total) by mouth before breakfast. 30 tablet 11    lisinopril 10 MG tablet Take 1 tablet (10 mg total) by mouth once daily. 90 tablet  "0    meloxicam (MOBIC) 7.5 MG tablet Take 1 tablet (7.5 mg total) by mouth once daily. 30 tablet 11    metFORMIN (GLUCOPHAGE) 1000 MG tablet Take 1 tablet (1,000 mg total) by mouth 2 (two) times daily with meals. 180 tablet 0    pantoprazole (PROTONIX) 20 MG tablet Take 1 tablet (20 mg total) by mouth 2 (two) times daily before meals.      pen needle, diabetic (BD ULTRA-FINE SHORT PEN NEEDLE) 31 gauge x 5/16" Ndle BD Ultra-Fine Short Pen Needle 31 gauge x 5/16"   Use daily with toujeo      amoxicillin-clavulanate 875-125mg (AUGMENTIN) 875-125 mg per tablet Take 1 tablet by mouth 2 (two) times daily. 20 tablet 0    ferrous sulfate 325 mg (65 mg iron) Tab tablet Take 1 tablet (325 mg total) by mouth 2 (two) times daily. 60 tablet 3    valACYclovir (VALTREX) 1000 MG tablet Take 1 tablet (1,000 mg total) by mouth every 12 (twelve) hours. 20 tablet 0     No current facility-administered medications for this visit.      Review of patient's allergies indicates:  No Known Allergies   Past Medical History:   Diagnosis Date    Anemia     Anxiety     Arthritis     Diabetes mellitus     Diabetes mellitus, type 2     Encounter for blood transfusion     2    Hypertension     Obesity     Thyroid disease      Past Surgical History:   Procedure Laterality Date     SECTION      THYROIDECTOMY, PARTIAL      TUBAL LIGATION         Review of Systems   Constitutional: Negative for appetite change, chills, diaphoresis, fatigue, fever and unexpected weight change.   HENT: Negative for congestion, ear discharge, hearing loss, sore throat, trouble swallowing and voice change.    Eyes: Negative for photophobia and pain.   Respiratory: Negative for cough, chest tightness and stridor.    Cardiovascular: Negative for chest pain and palpitations.   Gastrointestinal: Negative for abdominal pain, blood in stool, constipation, diarrhea, hematochezia and vomiting.   Endocrine: Negative for cold intolerance and heat " "intolerance.   Genitourinary: Negative for difficulty urinating, flank pain and hematuria.   Musculoskeletal: Negative for joint swelling and neck stiffness.   Skin: Negative for pallor and rash.   Neurological: Negative for dizziness, vertigo, speech difficulty, weakness, light-headedness and headaches.   Hematological: Does not bruise/bleed easily.   Psychiatric/Behavioral: Negative for confusion.      OBJECTIVE:      Vitals:    08/31/18 0957   BP: 130/80   Pulse: 75   Temp: 98.4 °F (36.9 °C)   TempSrc: Oral   SpO2: 98%   Weight: 108.4 kg (239 lb)   Height: 5' 6" (1.676 m)     Physical Exam   Constitutional: She is oriented to person, place, and time. She appears well-developed and well-nourished.   HENT:   Head: Atraumatic.   Right Ear: Tympanic membrane normal.   Left Ear: Tympanic membrane normal.   Nose: Nose normal.   Fever blister noted to left top and bottom lip, crusty, painful and oozing   Eyes: Conjunctivae are normal.   Neck: Neck supple.   Cardiovascular: Normal rate, regular rhythm, normal heart sounds and intact distal pulses.   Pulmonary/Chest: Effort normal and breath sounds normal.   Abdominal: Soft. Bowel sounds are normal. She exhibits no distension.   Musculoskeletal: Normal range of motion.   Lymphadenopathy:        Head (right side): Submental and submandibular adenopathy present.        Head (left side): Submental adenopathy present.     She has no cervical adenopathy.   Neurological: She is alert and oriented to person, place, and time.   Skin: Skin is warm and dry.   Psychiatric: She has a normal mood and affect.   Nursing note and vitals reviewed.     Assessment:       1. Submental lymphadenitis    2. Fever blister    3. Hypothyroidism, unspecified type    4. Type 2 diabetes mellitus without complication, with long-term current use of insulin    5. Anemia, unspecified type        Plan:       Submental lymphadenitis  - start amoxicillin-clavulanate 875-125mg (AUGMENTIN) 875-125 mg per " tablet; Take 1 tablet by mouth 2 (two) times daily.  Dispense: 20 tablet; Refill: 0    Fever blister  -  start   valACYclovir (VALTREX) 1000 MG tablet; Take 1 tablet (1,000 mg total) by mouth every 12 (twelve) hours.  Dispense: 20 tablet; Refill: 0    Hypothyroidism, unspecified type  -     TSH; Future; Expected date: 08/31/2018    Type 2 diabetes mellitus without complication, with long-term current use of insulin  -     Hemoglobin A1c; Future; Expected date: 08/31/2018  Instructed patient to increase xultophy by 2 units every 3 days for fasting blood sugar >150. Voiced understanding    Anemia, unspecified type  -     CBC auto differential; Future; Expected date: 08/31/2018  -     Ferritin; Future; Expected date: 08/31/2018  -     Iron and TIBC; Future; Expected date: 08/31/2018  -     ferrous sulfate 325 mg (65 mg iron) Tab tablet; Take 1 tablet (325 mg total) by mouth 2 (two) times daily.  Dispense: 60 tablet; Refill: 3        Follow-up in about 3 months (around 11/30/2018) for DM.      8/31/2018 DIANA Hernandes, FNP

## 2018-08-31 NOTE — PATIENT INSTRUCTIONS
Understanding Cold Sores  Cold sores are small blisters or sores on the lip or sometimes inside the mouth. Many people get them from time to time. Cold sores usually are not serious, and they usually heal in a week or two. They are caused by 2 related viruses, herpes simplex type 1 and 2. These viruses spread very easily. Many people have one or both of these viruses in their body. More than 4 in every 5 people are infected with herpes simplex type 1. Once you have the virus that causes cold sores, it stays in your body for the rest of your life. But it can be inactive for long periods.  What causes a cold sore?  Cold sores are usually caused by herpes simplex virus type 1. Less often, they are caused by herpes simplex virus type 2. Herpes simplex virus type 2 is the more common cause of genital sores. The herpes viruses can enter the body through a break in the skin such as a scrape. Or they may enter through mucous membranes such as the lips or mouth. Some ways to get the viruses include:  · Kissing someone who has a cold sore  · Sharing a drinking glass, eating utensils, or lip balm with someone who has a cold sore  · Having oral sex with someone who has a cold sore  A  baby can also get the infection at birth.  If you have a herpes virus, you can to pass it along even when you dont have a sore.  Cold sores flare up occasionally. Things that can cause an outbreak include:  · Sun exposure  · Fever  · Stress or exhaustion  · Menstruation  · Skin irritation  · Another unrelated Illness such as pneumonia, urinary infection, or cancer  What are the symptoms of a cold sore?  Symptoms can include:  · A blister-like sore or cluster of sores. These often occur at the edge of the lips but may appear inside the mouth.  · Skin redness around the sores.  · Pain or itching in the area of the outbreak. Often the pain or itching develops 12 to 24 hours before the sore become visible.  · Flu-like symptoms, including  swollen glands, headache, body ache, or fever. These typically occur only at the time of the first infection.  Cold sores may also occur on fingers. They may rarely infect the eyes, a serious possible complication.  Some people have symptoms a day or two before an outbreak. They may feel tenderness, burning, itching, or tingling before a cold sore appears. Cold sores tend to come back in the same area that they first appeared.  How are cold sores treated?  Treatment for cold sores focuses on relieving and shortening symptoms. For people with frequent outbreaks, treatment works to decrease how often future episodes.  Treatments may include:  · Prescription or over-the-counter pain medicines. These can help with discomfort, especially if sores are inside the mouth.  · Antiviral medicines. These may be pills that are taken by mouth or a cream to apply to sores. They may help shorten an outbreak and reduce the severity of symptoms.  They may be used to help prevent future outbreaks if you have disabling recurrent infections.  · Self-care such as extra rest and drinking more fluids. These may help relieve the flu-like symptoms of a first outbreak.  How are cold sores diagnosed?  Your healthcare provider makes the diagnosis mainly by looking at the sores and doing a clinical exam.  This may be confirmed by swab tests or blood tests.  How can I prevent cold sores?  You can help reduce the spread of the herpes viruses that cause cold sores. This can help both you and others avoid getting cold sores. Follow these tips:  · Do not kiss others if you have a cold sore. Also avoid kissing someone with a cold sore.  · Do not share eating utensils, lip balm, razors, or towels with someone who has a cold sore.  · Wash your hands after touching the area of a cold sore. The herpes virus can be carried from your face to your hands when you touch the area of a cold sore. When this happens, wash your hands thoroughly, for at least 20  seconds. When you cant wash with soap and water, use an alcohol-based hand .  · Disinfect things you touch often, such as phones and keyboards.    · If you feel a cold sore coming on, do the same things you would do when a cold sore is present to avoid spreading the virus.  · Use condoms to help prevent passing on the viruses through sex.  What are the possible complications of a cold sore?  Cold sores usually go away by themselves within 2 weeks. For most people cold sores are not serious. The viruses that cause cold sores can cause more serious illness, though. People who have a weak immune system may get more serious infections from herpes viruses. These include people being treated for cancer or who have HIV disease. Babies may also become very ill from a herpes infection.      When should I call my healthcare provider?  Call your healthcare provider right away if you have any of these:  · Fever of 100.4°F (38°C) or higher, or as directed  · Pain that gets worse  · You cannot eat or drink because of painful sores  · Symptoms dont get better within 5 to 7 days  · Blisters spread beyond the mouth or lip to areas on the chest, arms, face, or legs   Date Last Reviewed: 3/28/2016  © 5276-6993 [employer]. [address] [place] [address], [place] [address]. All rights reserved. This information is not intended as a substitute for professional medical care. Always follow your healthcare professional's instructions.

## 2018-09-19 ENCOUNTER — OFFICE VISIT (OUTPATIENT)
Dept: FAMILY MEDICINE | Facility: CLINIC | Age: 43
End: 2018-09-19
Payer: COMMERCIAL

## 2018-09-19 VITALS
OXYGEN SATURATION: 98 % | TEMPERATURE: 99 F | WEIGHT: 241 LBS | SYSTOLIC BLOOD PRESSURE: 130 MMHG | HEART RATE: 92 BPM | BODY MASS INDEX: 38.73 KG/M2 | DIASTOLIC BLOOD PRESSURE: 80 MMHG | HEIGHT: 66 IN

## 2018-09-19 DIAGNOSIS — Z20.2 POSSIBLE EXPOSURE TO STD: ICD-10-CM

## 2018-09-19 DIAGNOSIS — B37.31 VAGINAL YEAST INFECTION: ICD-10-CM

## 2018-09-19 DIAGNOSIS — Z79.4 TYPE 2 DIABETES MELLITUS WITHOUT COMPLICATION, WITH LONG-TERM CURRENT USE OF INSULIN: ICD-10-CM

## 2018-09-19 DIAGNOSIS — N89.8 VAGINAL ITCHING: Primary | ICD-10-CM

## 2018-09-19 DIAGNOSIS — E11.9 TYPE 2 DIABETES MELLITUS WITHOUT COMPLICATION, WITH LONG-TERM CURRENT USE OF INSULIN: ICD-10-CM

## 2018-09-19 PROBLEM — E03.9 HYPOTHYROIDISM: Status: ACTIVE | Noted: 2017-01-06

## 2018-09-19 PROBLEM — D50.9 IRON DEFICIENCY ANEMIA: Status: ACTIVE | Noted: 2017-01-06

## 2018-09-19 LAB
BILIRUB UR QL STRIP: NEGATIVE
GLUCOSE UR QL STRIP: NEGATIVE
HBA1C MFR BLD: 7.6 % (ref 4–5.6)
KETONES UR QL STRIP: NEGATIVE
LEUKOCYTE ESTERASE UR QL STRIP: POSITIVE
PH, POC UA: 6.5
POC BLOOD, URINE: NEGATIVE
POC NITRATES, URINE: NEGATIVE
PROT UR QL STRIP: NEGATIVE
SP GR UR STRIP: 1.02 (ref 1–1.03)
UROBILINOGEN UR STRIP-ACNC: 4 (ref 0.1–1.1)

## 2018-09-19 PROCEDURE — 3008F BODY MASS INDEX DOCD: CPT | Mod: ,,, | Performed by: NURSE PRACTITIONER

## 2018-09-19 PROCEDURE — 3079F DIAST BP 80-89 MM HG: CPT | Mod: ,,, | Performed by: NURSE PRACTITIONER

## 2018-09-19 PROCEDURE — 3045F PR MOST RECENT HEMOGLOBIN A1C LEVEL 7.0-9.0%: CPT | Mod: ,,, | Performed by: NURSE PRACTITIONER

## 2018-09-19 PROCEDURE — 99214 OFFICE O/P EST MOD 30 MIN: CPT | Mod: 25,,, | Performed by: NURSE PRACTITIONER

## 2018-09-19 PROCEDURE — 3075F SYST BP GE 130 - 139MM HG: CPT | Mod: ,,, | Performed by: NURSE PRACTITIONER

## 2018-09-19 PROCEDURE — 81003 URINALYSIS AUTO W/O SCOPE: CPT | Mod: QW,,, | Performed by: NURSE PRACTITIONER

## 2018-09-19 PROCEDURE — 83036 HEMOGLOBIN GLYCOSYLATED A1C: CPT | Mod: QW,,, | Performed by: NURSE PRACTITIONER

## 2018-09-19 RX ORDER — CLOTRIMAZOLE 1 %
CREAM (GRAM) TOPICAL 2 TIMES DAILY
Qty: 1 TUBE | Refills: 0 | Status: CANCELLED | OUTPATIENT
Start: 2018-09-19

## 2018-09-19 RX ORDER — ASPIRIN 325 MG
1 TABLET, DELAYED RELEASE (ENTERIC COATED) ORAL NIGHTLY
Qty: 1 TUBE | Refills: 0 | Status: SHIPPED | OUTPATIENT
Start: 2018-09-19 | End: 2018-09-22

## 2018-09-19 RX ORDER — FLUCONAZOLE 150 MG/1
150 TABLET ORAL DAILY
Qty: 1 TABLET | Refills: 1 | Status: SHIPPED | OUTPATIENT
Start: 2018-09-19 | End: 2018-09-20

## 2018-09-19 NOTE — PATIENT INSTRUCTIONS
Vaginal Infection: Yeast (Candidiasis)  Yeast infection occurs when yeast in the vagina increase and attacks the vaginal tissues. Yeast is a type of fungus. These infections are often caused by a type of yeast called Candida albicans. Other species of yeast can also cause infections. Factors that may make infection more likely include recent antibiotic use, douching, or increased sex. Yeast infections are more common in women who have diabetes, or are obese or pregnant, or have a weak immune system.  Symptoms of yeast infection  · Clumpy or thin, white discharge, which may look like cottage cheese  · No odor or minimal odor  · Severe vaginal itching or burning  · Burning with urination  · Swelling, redness of vulva  · Pain during sex  Treating yeast infection  Yeast infection is treated with a vaginal antifungal cream. In some cases, antifungal pills are prescribed instead. During treatment:  · Finish all of your medicine, even if your symptoms go away.  · Apply the cream before going to bed. Lie flat after applying so that it doesn't drip out.  · Do not douche or use tampons.  · Don't rely on a diaphragm or condoms, since the cream may weaken them.  · Avoid intercourse if advised by your healthcare provider.     Should I treat a yeast infection myself?  Discuss with your healthcare provider whether you should use over-the-counter medicines to treat a yeast infection. Self-treatment may depend on whether:  · You've had a yeast infection in the past.  · You're at risk for STDs.  Call your healthcare provider if symptoms do not go away or come back after treatment.   Date Last Reviewed: 3/1/2017  © 8388-9429 [employer]. [address] [place] [address], [place] [address]. All rights reserved. This information is not intended as a substitute for professional medical care. Always follow your healthcare professional's instructions.

## 2018-09-19 NOTE — PROGRESS NOTES
SUBJECTIVE:      Patient ID: Haydee Allen is a 43 y.o. female.    Chief Complaint: Urinary Tract Infection (itching for 3 days)    Patient recently had sexual contact with a boyfriend who was released from [facility] a few weeks ago. He went to the Er a week ago with a swollen red testicle and treated for gonorrhea. Over the past week patient is complaining of vaginal itching and discharge. She is diabetic and her am blood sugar has been 150's, she is taking 26 units of xultophy. A1c improved today. She says she is walking a few times a week and eating healthier      Vaginal Itching   The patient's primary symptoms include vaginal discharge. This is a new problem. The current episode started in the past 7 days. The problem occurs daily. The problem has been gradually worsening. The problem affects both sides. She is not pregnant. Associated symptoms include dysuria. Pertinent negatives include no abdominal pain, chills, constipation, diarrhea, fever, flank pain, hematuria, painful intercourse or vomiting. The vaginal discharge was thick. There has been no bleeding. She has not been passing clots. She has not been passing tissue. She has tried nothing for the symptoms. She is sexually active. Yes, her partner has an STD. She uses nothing for contraception.   Diabetes   She presents for her follow-up diabetic visit. She has type 2 diabetes mellitus. Her disease course has been improving. There are no hypoglycemic associated symptoms. Pertinent negatives for hypoglycemia include no confusion, pallor or speech difficulty. Pertinent negatives for diabetes include no chest pain. There are no hypoglycemic complications. Symptoms are stable. Current diabetic treatment includes insulin injections. Her weight is stable. Her breakfast blood glucose is taken between 7-8 am. Her breakfast blood glucose range is generally 140-180 mg/dl.       Past Surgical History:   Procedure Laterality Date     SECTION       THYROIDECTOMY, PARTIAL      TUBAL LIGATION       Family History   Problem Relation Age of Onset    Cancer Mother     Hypertension Mother     Diabetes Mother     Arthritis Mother     Heart disease Mother     Kidney disease Mother     Cancer Father       Social History     Socioeconomic History    Marital status: Single     Spouse name: None    Number of children: None    Years of education: None    Highest education level: None   Social Needs    Financial resource strain: None    Food insecurity - worry: None    Food insecurity - inability: None    Transportation needs - medical: None    Transportation needs - non-medical: None   Occupational History    None   Tobacco Use    Smoking status: Never Smoker    Smokeless tobacco: Never Used   Substance and Sexual Activity    Alcohol use: No    Drug use: No    Sexual activity: No   Other Topics Concern    None   Social History Narrative    None     Current Outpatient Medications   Medication Sig Dispense Refill    blood sugar diagnostic (TRUE METRIX GLUCOSE TEST STRIP MISC) True Metrix Glucose Test Strip   Use twice daily as directed      ferrous sulfate 325 mg (65 mg iron) Tab tablet Take 1 tablet (325 mg total) by mouth 2 (two) times daily. 60 tablet 3    hydrOXYzine HCl (ATARAX) 10 MG Tab Take 25 mg by mouth 3 (three) times daily as needed.      insulin degludec-liraglutide (XULTOPHY 100/3.6) 100 unit-3.6 mg /mL (3 mL) InPn Inject 16 Units into the skin once daily. 3 mL 2    levothyroxine (SYNTHROID) 300 MCG Tab Take 1 tablet (300 mcg total) by mouth before breakfast. 30 tablet 11    lisinopril 10 MG tablet Take 1 tablet (10 mg total) by mouth once daily. 90 tablet 0    meloxicam (MOBIC) 7.5 MG tablet Take 1 tablet (7.5 mg total) by mouth once daily. 30 tablet 11    metFORMIN (GLUCOPHAGE) 1000 MG tablet Take 1 tablet (1,000 mg total) by mouth 2 (two) times daily with meals. 180 tablet 0    pantoprazole (PROTONIX) 20 MG tablet Take 1  "tablet (20 mg total) by mouth 2 (two) times daily before meals.      pen needle, diabetic (BD ULTRA-FINE SHORT PEN NEEDLE) 31 gauge x 5/16" Ndle BD Ultra-Fine Short Pen Needle 31 gauge x 5/16"   Use daily with toujeo      valACYclovir (VALTREX) 1000 MG tablet Take 1 tablet (1,000 mg total) by mouth every 12 (twelve) hours. 20 tablet 0    clotrimazole (LOTRIMIN) 1 % Crea Place 1 suppository (1 applicator total) vaginally every evening. for 3 days 1 Tube 0    fluconazole (DIFLUCAN) 150 MG Tab Take 1 tablet (150 mg total) by mouth once daily. for 1 day 1 tablet 1     No current facility-administered medications for this visit.      Review of patient's allergies indicates:  No Known Allergies   Past Medical History:   Diagnosis Date    Anemia     Anxiety     Arthritis     Diabetes mellitus     Diabetes mellitus, type 2     Encounter for blood transfusion     2    Hypertension     Obesity     Thyroid disease      Past Surgical History:   Procedure Laterality Date     SECTION      THYROIDECTOMY, PARTIAL      TUBAL LIGATION         Review of Systems   Constitutional: Negative for appetite change, chills, diaphoresis, fever and unexpected weight change.   HENT: Negative for ear discharge, hearing loss, trouble swallowing and voice change.    Eyes: Negative for photophobia and pain.   Respiratory: Negative for chest tightness and stridor.    Cardiovascular: Negative for chest pain and palpitations.   Gastrointestinal: Negative for abdominal pain, blood in stool, constipation, diarrhea and vomiting.   Endocrine: Negative for cold intolerance and heat intolerance.   Genitourinary: Positive for dysuria and vaginal discharge. Negative for difficulty urinating, flank pain and hematuria.   Musculoskeletal: Negative for joint swelling and neck stiffness.   Skin: Negative for pallor.   Neurological: Negative for speech difficulty.   Hematological: Does not bruise/bleed easily.   Psychiatric/Behavioral: " "Negative for confusion.      OBJECTIVE:      Vitals:    09/19/18 1550   BP: 130/80   Pulse: 92   Temp: 98.5 °F (36.9 °C)   TempSrc: Oral   SpO2: 98%   Weight: 109.3 kg (241 lb)   Height: 5' 6" (1.676 m)     Physical Exam   Constitutional: She is oriented to person, place, and time. She appears well-developed and well-nourished.   HENT:   Head: Atraumatic.   Eyes: Conjunctivae are normal.   Neck: Neck supple.   Cardiovascular: Normal rate, regular rhythm, normal heart sounds and intact distal pulses.   Pulmonary/Chest: Effort normal and breath sounds normal.   Abdominal: Soft. Bowel sounds are normal. She exhibits no distension.   Genitourinary: There is rash and tenderness on the right labia. There is no lesion on the right labia. There is rash and tenderness on the left labia. There is no lesion on the left labia. There is erythema in the vagina. Vaginal discharge found.   Musculoskeletal: Normal range of motion.   Neurological: She is alert and oriented to person, place, and time.   Skin: Skin is warm and dry.   Psychiatric: She has a normal mood and affect.   Nursing note and vitals reviewed.     Assessment:       1. Vaginal itching    2. Type 2 diabetes mellitus without complication, with long-term current use of insulin    3. Vaginal yeast infection    4. Possible exposure to STD        Plan:       Vaginal itching  -     POCT Urinalysis, Dipstick, Automated, W/O Scope                              Neg  Type 2 diabetes mellitus without complication, with long-term current use of insulin  -     Hemoglobin A1C, POCT                          A1c 7.6    Vaginal yeast infection  -  start   fluconazole (DIFLUCAN) 150 MG Tab; Take 1 tablet (150 mg total) by mouth once daily. for 1 day  Dispense: 1 tablet; Refill: 1  -  start  clotrimazole (LOTRIMIN) 1 % Crea; Place 1 suppository (1 applicator total) vaginally every evening. for 3 days  Dispense: 1 Tube; Refill: 0    Possible exposure to STD  -     HIV-1 and HIV-2 " antibodies; Future; Expected date: 09/19/2018  -     RPR; Future; Expected date: 09/19/2018  Leukorrhea panel sent to lab        Follow-up if symptoms worsen or fail to improve.      9/19/2018 DIANA Hernandes, FNP

## 2018-09-25 ENCOUNTER — TELEPHONE (OUTPATIENT)
Dept: FAMILY MEDICINE | Facility: CLINIC | Age: 43
End: 2018-09-25

## 2018-09-25 NOTE — TELEPHONE ENCOUNTER
----- Message from Dalton Navas NP sent at 9/24/2018  7:38 AM CDT -----  neg  ----- Message -----  From: Julieta Rick  Sent: 9/24/2018   7:21 AM  To: Dalton Navas NP    Labs 9/19/18

## 2018-10-24 DIAGNOSIS — I10 ESSENTIAL HYPERTENSION: ICD-10-CM

## 2018-10-25 RX ORDER — LISINOPRIL 10 MG/1
TABLET ORAL
Qty: 90 TABLET | Refills: 0 | Status: SHIPPED | OUTPATIENT
Start: 2018-10-25 | End: 2019-01-08 | Stop reason: SDUPTHER

## 2018-12-20 ENCOUNTER — TELEPHONE (OUTPATIENT)
Dept: ADMINISTRATIVE | Facility: CLINIC | Age: 43
End: 2018-12-20

## 2018-12-20 ENCOUNTER — TELEPHONE (OUTPATIENT)
Dept: FAMILY MEDICINE | Facility: CLINIC | Age: 43
End: 2018-12-20

## 2018-12-20 NOTE — TELEPHONE ENCOUNTER
Attempted to contact pt to schedule htn/dm f/u appt. Number on file has been disconnected.  No other way to reach pt.

## 2019-01-08 DIAGNOSIS — Z79.4 TYPE 2 DIABETES MELLITUS WITHOUT COMPLICATION, WITH LONG-TERM CURRENT USE OF INSULIN: ICD-10-CM

## 2019-01-08 DIAGNOSIS — I10 ESSENTIAL HYPERTENSION: ICD-10-CM

## 2019-01-08 DIAGNOSIS — E11.9 TYPE 2 DIABETES MELLITUS WITHOUT COMPLICATION, WITH LONG-TERM CURRENT USE OF INSULIN: ICD-10-CM

## 2019-01-08 RX ORDER — LISINOPRIL 10 MG/1
TABLET ORAL
Qty: 90 TABLET | Refills: 0 | Status: SHIPPED | OUTPATIENT
Start: 2019-01-08 | End: 2019-04-26 | Stop reason: SDUPTHER

## 2019-01-08 RX ORDER — METFORMIN HYDROCHLORIDE 1000 MG/1
TABLET ORAL
Qty: 180 TABLET | Refills: 0 | Status: SHIPPED | OUTPATIENT
Start: 2019-01-08 | End: 2019-07-29 | Stop reason: SDUPTHER

## 2019-01-31 ENCOUNTER — TELEPHONE (OUTPATIENT)
Dept: FAMILY MEDICINE | Facility: CLINIC | Age: 44
End: 2019-01-31

## 2019-01-31 NOTE — TELEPHONE ENCOUNTER
Attempted to contact patient to schedule appointment.  Number on file is disconnected; unable to leave message.

## 2019-04-26 ENCOUNTER — OFFICE VISIT (OUTPATIENT)
Dept: FAMILY MEDICINE | Facility: CLINIC | Age: 44
End: 2019-04-26
Payer: COMMERCIAL

## 2019-04-26 VITALS
BODY MASS INDEX: 40.34 KG/M2 | DIASTOLIC BLOOD PRESSURE: 94 MMHG | WEIGHT: 251 LBS | HEART RATE: 82 BPM | HEIGHT: 66 IN | OXYGEN SATURATION: 96 % | SYSTOLIC BLOOD PRESSURE: 136 MMHG

## 2019-04-26 DIAGNOSIS — E03.9 ACQUIRED HYPOTHYROIDISM: ICD-10-CM

## 2019-04-26 DIAGNOSIS — I10 ESSENTIAL HYPERTENSION: ICD-10-CM

## 2019-04-26 DIAGNOSIS — F41.8 ANXIETY WITH DEPRESSION: ICD-10-CM

## 2019-04-26 DIAGNOSIS — K21.9 GASTROESOPHAGEAL REFLUX DISEASE WITHOUT ESOPHAGITIS: ICD-10-CM

## 2019-04-26 DIAGNOSIS — Z79.4 TYPE 2 DIABETES MELLITUS WITHOUT COMPLICATION, WITH LONG-TERM CURRENT USE OF INSULIN: Primary | ICD-10-CM

## 2019-04-26 DIAGNOSIS — E11.9 TYPE 2 DIABETES MELLITUS WITHOUT COMPLICATION, WITH LONG-TERM CURRENT USE OF INSULIN: Primary | ICD-10-CM

## 2019-04-26 DIAGNOSIS — D64.9 ANEMIA, UNSPECIFIED TYPE: ICD-10-CM

## 2019-04-26 DIAGNOSIS — R10.11 RIGHT UPPER QUADRANT ABDOMINAL PAIN: ICD-10-CM

## 2019-04-26 PROCEDURE — 3075F PR MOST RECENT SYSTOLIC BLOOD PRESS GE 130-139MM HG: ICD-10-PCS | Mod: ,,, | Performed by: NURSE PRACTITIONER

## 2019-04-26 PROCEDURE — 3045F PR MOST RECENT HEMOGLOBIN A1C LEVEL 7.0-9.0%: CPT | Mod: ,,, | Performed by: NURSE PRACTITIONER

## 2019-04-26 PROCEDURE — 99214 PR OFFICE/OUTPT VISIT, EST, LEVL IV, 30-39 MIN: ICD-10-PCS | Mod: ,,, | Performed by: NURSE PRACTITIONER

## 2019-04-26 PROCEDURE — 3008F PR BODY MASS INDEX (BMI) DOCUMENTED: ICD-10-PCS | Mod: ,,, | Performed by: NURSE PRACTITIONER

## 2019-04-26 PROCEDURE — 3080F DIAST BP >= 90 MM HG: CPT | Mod: ,,, | Performed by: NURSE PRACTITIONER

## 2019-04-26 PROCEDURE — 3080F PR MOST RECENT DIASTOLIC BLOOD PRESSURE >= 90 MM HG: ICD-10-PCS | Mod: ,,, | Performed by: NURSE PRACTITIONER

## 2019-04-26 PROCEDURE — 3045F PR MOST RECENT HEMOGLOBIN A1C LEVEL 7.0-9.0%: ICD-10-PCS | Mod: ,,, | Performed by: NURSE PRACTITIONER

## 2019-04-26 PROCEDURE — 99214 OFFICE O/P EST MOD 30 MIN: CPT | Mod: ,,, | Performed by: NURSE PRACTITIONER

## 2019-04-26 PROCEDURE — 3008F BODY MASS INDEX DOCD: CPT | Mod: ,,, | Performed by: NURSE PRACTITIONER

## 2019-04-26 PROCEDURE — 3075F SYST BP GE 130 - 139MM HG: CPT | Mod: ,,, | Performed by: NURSE PRACTITIONER

## 2019-04-26 RX ORDER — SERTRALINE HYDROCHLORIDE 25 MG/1
25 TABLET, FILM COATED ORAL DAILY
Qty: 30 TABLET | Refills: 1 | Status: SHIPPED | OUTPATIENT
Start: 2019-04-26 | End: 2019-05-20

## 2019-04-26 RX ORDER — LISINOPRIL 20 MG/1
20 TABLET ORAL DAILY
Qty: 30 TABLET | Refills: 1 | Status: SHIPPED | OUTPATIENT
Start: 2019-04-26 | End: 2019-07-29 | Stop reason: SDUPTHER

## 2019-04-26 RX ORDER — PANTOPRAZOLE SODIUM 40 MG/1
40 TABLET, DELAYED RELEASE ORAL DAILY
Qty: 30 TABLET | Refills: 1 | Status: SHIPPED | OUTPATIENT
Start: 2019-04-26 | End: 2019-05-20 | Stop reason: ALTCHOICE

## 2019-04-26 NOTE — PATIENT INSTRUCTIONS
"    [place] Diet  Your healthcare provider may recommend a bland diet if you have an upset stomach. It consists of foods that are mild and easy to digest. It is better to eat small frequent meals rather than 3 large meals a day.    Beverages  OK: Fruit juices, non-caffeinated teas and coffee, non-carbonated ochoa  Avoid: Carbonated beverage, caffeinated tea and coffee, all alcoholic beverages  Bread  OK: Refined white, wheat or rye bread, nilam or soda crackers, [place] toast, plain rolls, bagels  Avoid: Whole-grain bread  Cereal  OK: Refined cereals: cooked or ready to eat  Avoid: Whole-grain cereals and granola, or those containing bran, seeds or nuts  Desserts  OK: Peanut butter and all others except those to "avoid"  Avoid: Chocolate, cocoa, coconut, popcorn, nuts, seeds, jam, marmalade  Fruits  OK: Canned, cooked, frozen or fresh fruits without seeds or tough skin  Avoid: Olives, skin and seeds of fruit  Meats  OK: All fresh or preserved meat, fish and fowl  Avoid: Any that are prepared with those spices to "avoid"  Cheese and eggs  OK: Eggs, cottage cheese, cream cheese, other cheeses  Avoid: All cheeses made with those spices to "avoid"  Potatoes and pasta  OK: Potato, rice, macaroni, noodles, spaghetti  Avoid: None  Soups  OK: All soups without heavy seasoning  Avoid: Soups made with those spices to "avoid"  Vegetables  OK: Canned, cooked, fresh or frozen mildly flavored vegetables without seeds, skins or coarse fiber  Avoid: Vegetables prepared with those spices to "avoid"; skin and seeds of vegetables and those with coarse fiber  Spices  OK: Salt, lemon and lime juice, vinegar, all extracts, mera, cinnamon, thyme, mace, allspice, paprika  Avoid: Chili powder, cloves, pepper, seed spices, garlic, gravy pickles, highly seasoned salad dressings  Date Last Reviewed: 11/20/2015  © 1604-0280 [employer]. [address] [place] [address]. All rights reserved. This information is not intended " as a substitute for professional medical care. Always follow your healthcare professional's instructions.

## 2019-04-26 NOTE — PROGRESS NOTES
SUBJECTIVE:      Patient ID: Haydee Allen is a 43 y.o. female.    Chief Complaint: Headache and Abdominal Pain       is here today complaining of right upper abdominal pain on and off for the past year. Over the past 2 months the pain has gotten more frequent. Notices the pain more after eating   Diabetes: pt in on xulopthy currently takign 40 units. Complaining of burning in her hands and feet. Says she has not been eating right and not taking care of her self. Says she has a lot of stress in her life right now. Currently going through a divorce and having difficulty with her children. Would like to try medication to help with the anxiety  Anemia: pt had blood transfusion in June, not taking iron supplements. Due for labs    Diabetes   She presents for her follow-up diabetic visit. She has type 2 diabetes mellitus. Hypoglycemia symptoms include nervousness/anxiousness. Pertinent negatives for hypoglycemia include no confusion, dizziness, pallor or speech difficulty. Pertinent negatives for diabetes include no chest pain and no weakness. There are no hypoglycemic complications. Symptoms are stable. Risk factors for coronary artery disease include obesity, diabetes mellitus and hypertension. Current diabetic treatment includes insulin injections and oral agent (monotherapy). She is compliant with treatment some of the time. Her weight is decreasing steadily. She is following a generally unhealthy diet. When asked about meal planning, she reported none. She participates in exercise intermittently. Her breakfast blood glucose is taken between 7-8 am. Her breakfast blood glucose range is generally 140-180 mg/dl. An ACE inhibitor/angiotensin II receptor blocker is being taken. She does not see a podiatrist.Eye exam is current.   Thyroid Problem   Presents for follow-up visit. Symptoms include anxiety and depressed mood. Patient reports no cold intolerance, constipation, diaphoresis, diarrhea, heat  intolerance or palpitations. The symptoms have been stable.   Anemia   Presents for follow-up visit. Symptoms include abdominal pain. There has been no anorexia, bruising/bleeding easily, confusion, fever, light-headedness, pallor or palpitations. Signs of blood loss that are not present include hematochezia. Procedure history includes colonoscopy and EGD.   Anxiety   Presents for initial visit. Onset was 1 to 6 months ago. The problem has been gradually worsening. Symptoms include depressed mood, excessive worry, insomnia, irritability, nausea and nervous/anxious behavior. Patient reports no chest pain, confusion, dizziness, palpitations, panic, shortness of breath or suicidal ideas. Symptoms occur constantly. The severity of symptoms is mild. The symptoms are aggravated by family issues. The quality of sleep is poor. Nighttime awakenings: one to two.     Risk factors include marital problems. Her past medical history is significant for anemia. Past treatments include nothing.   Abdominal Pain   This is a new problem. The current episode started more than 1 month ago. The onset quality is gradual. The problem occurs daily. The problem has been gradually worsening. The pain is located in the RUQ. The pain is mild. The quality of the pain is dull and aching. The abdominal pain does not radiate. Associated symptoms include nausea. Pertinent negatives include no anorexia, belching, constipation, diarrhea, fever, frequency, hematochezia or vomiting. The pain is aggravated by eating. Relieved by: pepto bismal. She has tried antacids for the symptoms. The treatment provided mild relief. Prior diagnostic workup includes ultrasound.       Past Surgical History:   Procedure Laterality Date     SECTION      THYROIDECTOMY, PARTIAL      TUBAL LIGATION       Family History   Problem Relation Age of Onset    Cancer Mother     Hypertension Mother     Diabetes Mother     Arthritis Mother     Heart disease Mother      Kidney disease Mother     Cancer Father       Social History     Socioeconomic History    Marital status: Single     Spouse name: Not on file    Number of children: Not on file    Years of education: Not on file    Highest education level: Not on file   Occupational History    Not on file   Social Needs    Financial resource strain: Not on file    Food insecurity:     Worry: Not on file     Inability: Not on file    Transportation needs:     Medical: Not on file     Non-medical: Not on file   Tobacco Use    Smoking status: Never Smoker    Smokeless tobacco: Never Used   Substance and Sexual Activity    Alcohol use: No    Drug use: No    Sexual activity: Never   Lifestyle    Physical activity:     Days per week: Not on file     Minutes per session: Not on file    Stress: Not on file   Relationships    Social connections:     Talks on phone: Not on file     Gets together: Not on file     Attends Jewish service: Not on file     Active member of club or organization: Not on file     Attends meetings of clubs or organizations: Not on file     Relationship status: Not on file   Other Topics Concern    Not on file   Social History Narrative    Not on file     Current Outpatient Medications   Medication Sig Dispense Refill    blood sugar diagnostic (TRUE METRIX GLUCOSE TEST STRIP MISC) True Metrix Glucose Test Strip   Use twice daily as directed      ferrous sulfate 325 mg (65 mg iron) Tab tablet Take 1 tablet (325 mg total) by mouth 2 (two) times daily. 60 tablet 3    insulin degludec-liraglutide (XULTOPHY 100/3.6) 100 unit-3.6 mg /mL (3 mL) InPn Inject 16 Units into the skin once daily. 3 mL 2    levothyroxine (SYNTHROID) 300 MCG Tab Take 1 tablet (300 mcg total) by mouth before breakfast. 30 tablet 11    lisinopril (PRINIVIL,ZESTRIL) 20 MG tablet Take 1 tablet (20 mg total) by mouth once daily. 30 tablet 1    metFORMIN (GLUCOPHAGE) 1000 MG tablet TAKE 1 TABLET BY MOUTH TWICE DAILY WITH  "MEALS 180 tablet 0    pen needle, diabetic (BD ULTRA-FINE SHORT PEN NEEDLE) 31 gauge x 5/16" Ndle BD Ultra-Fine Short Pen Needle 31 gauge x 5/16"   Use daily with toujeo      pantoprazole (PROTONIX) 40 MG tablet Take 1 tablet (40 mg total) by mouth once daily. 30 tablet 1    sertraline (ZOLOFT) 25 MG tablet Take 1 tablet (25 mg total) by mouth once daily. 30 tablet 1     No current facility-administered medications for this visit.      Review of patient's allergies indicates:  No Known Allergies   Past Medical History:   Diagnosis Date    Anemia     Anxiety     Arthritis     Diabetes mellitus     Diabetes mellitus, type 2     Encounter for blood transfusion     2    Hypertension     Obesity     Thyroid disease      Past Surgical History:   Procedure Laterality Date     SECTION      THYROIDECTOMY, PARTIAL      TUBAL LIGATION         Review of Systems   Constitutional: Positive for irritability. Negative for appetite change, chills, diaphoresis, fever and unexpected weight change.   HENT: Negative for congestion, ear discharge, hearing loss, postnasal drip, trouble swallowing and voice change.    Eyes: Negative for photophobia and pain.   Respiratory: Negative for chest tightness, shortness of breath and stridor.    Cardiovascular: Negative for chest pain and palpitations.   Gastrointestinal: Positive for abdominal pain and nausea. Negative for anorexia, blood in stool, constipation, diarrhea, hematochezia and vomiting.   Endocrine: Negative for cold intolerance and heat intolerance.   Genitourinary: Negative for difficulty urinating, flank pain and frequency.   Musculoskeletal: Negative for back pain, joint swelling and neck stiffness.   Skin: Negative for pallor and rash.   Neurological: Negative for dizziness, speech difficulty, weakness and light-headedness.   Hematological: Does not bruise/bleed easily.   Psychiatric/Behavioral: Negative for confusion, dysphoric mood and suicidal ideas. The " "patient is nervous/anxious and has insomnia.       OBJECTIVE:      Vitals:    04/26/19 0910 04/26/19 0938   BP: (!) 130/90 (!) 136/94   Pulse: 82    SpO2: 96%    Weight: 113.9 kg (251 lb)    Height: 5' 6" (1.676 m)      Physical Exam   Constitutional: She is oriented to person, place, and time. She appears well-developed and well-nourished.   HENT:   Head: Atraumatic.   Eyes: Conjunctivae are normal.   Neck: Neck supple. No JVD present. Carotid bruit is not present.   Cardiovascular: Normal rate, regular rhythm, normal heart sounds and intact distal pulses.   Pulmonary/Chest: Effort normal and breath sounds normal.   Abdominal: Soft. Bowel sounds are normal. She exhibits no distension. There is no hepatosplenomegaly. There is tenderness in the right upper quadrant.   Musculoskeletal: Normal range of motion.   Lymphadenopathy:     She has no cervical adenopathy.   Neurological: She is alert and oriented to person, place, and time.   Skin: Skin is warm and dry. No rash noted.   Psychiatric: Her speech is normal and behavior is normal. She exhibits a depressed mood.   Nursing note and vitals reviewed.     Assessment:       1. Type 2 diabetes mellitus without complication, with long-term current use of insulin    2. Acquired hypothyroidism    3. Essential hypertension    4. Anemia, unspecified type    5. Right upper quadrant abdominal pain    6. Anxiety with depression    7. Gastroesophageal reflux disease without esophagitis        Plan:       Type 2 diabetes mellitus without complication, with long-term current use of insulin  -     Comprehensive metabolic panel; Future; Expected date: 04/26/2019  -     Microalbumin/creatinine urine ratio; Future; Expected date: 04/26/2019  -     Hemoglobin A1c; Future; Expected date: 04/26/2019    Acquired hypothyroidism  -     Lipid panel; Future; Expected date: 04/26/2019  -     TSH; Future; Expected date: 04/26/2019    Essential hypertension  -     CBC auto differential; Future; " Expected date: 04/26/2019  -     Urinalysis; Future; Expected date: 04/26/2019  -   Increase  lisinopril (PRINIVIL,ZESTRIL) 20 MG tablet; Take 1 tablet (20 mg total) by mouth once daily.  Dispense: 30 tablet; Refill: 1    Anemia, unspecified type  -     CBC auto differential; Future; Expected date: 04/26/2019    Right upper quadrant abdominal pain  -     US Abdomen Complete; Future; Expected date: 04/26/2019  Discussed with patient if pain worsens or accompanied with vomiting/fever report to the ER.     Anxiety with depression  -   start  sertraline (ZOLOFT) 25 MG tablet; Take 1 tablet (25 mg total) by mouth once daily.  Dispense: 30 tablet; Refill: 1  Patient advised to take 1/2 tab x7d then 1 tab    Gastroesophageal reflux disease without esophagitis  -  start   pantoprazole (PROTONIX) 40 MG tablet; Take 1 tablet (40 mg total) by mouth once daily.  Dispense: 30 tablet; Refill: 1        Follow up in about 3 weeks (around 5/17/2019) for DM .      4/26/2019 DIANA Hernandes, FNP

## 2019-05-13 ENCOUNTER — HOSPITAL ENCOUNTER (OUTPATIENT)
Dept: RADIOLOGY | Facility: HOSPITAL | Age: 44
Discharge: HOME | End: 2019-05-13
Attending: OBSTETRICS & GYNECOLOGY
Payer: COMMERCIAL

## 2019-05-13 ENCOUNTER — OFFICE VISIT (OUTPATIENT)
Dept: OBSTETRICS AND GYNECOLOGY | Facility: CLINIC | Age: 44
End: 2019-05-13
Payer: COMMERCIAL

## 2019-05-13 VITALS
BODY MASS INDEX: 39.32 KG/M2 | WEIGHT: 244.69 LBS | HEIGHT: 66 IN | SYSTOLIC BLOOD PRESSURE: 124 MMHG | RESPIRATION RATE: 18 BRPM | DIASTOLIC BLOOD PRESSURE: 82 MMHG

## 2019-05-13 VITALS — HEIGHT: 66 IN | BODY MASS INDEX: 39.21 KG/M2 | WEIGHT: 244 LBS

## 2019-05-13 DIAGNOSIS — R10.2 PELVIC PAIN: Primary | ICD-10-CM

## 2019-05-13 DIAGNOSIS — Z01.419 ENCOUNTER FOR GYNECOLOGICAL EXAMINATION: ICD-10-CM

## 2019-05-13 DIAGNOSIS — Z12.31 SCREENING MAMMOGRAM, ENCOUNTER FOR: ICD-10-CM

## 2019-05-13 DIAGNOSIS — R10.10 PAIN LOCALIZED TO UPPER ABDOMEN: ICD-10-CM

## 2019-05-13 DIAGNOSIS — Z01.419 ENCOUNTER FOR WELL WOMAN EXAM WITH ROUTINE GYNECOLOGICAL EXAM: ICD-10-CM

## 2019-05-13 PROCEDURE — 3079F PR MOST RECENT DIASTOLIC BLOOD PRESSURE 80-89 MM HG: ICD-10-PCS | Mod: CPTII,S$GLB,, | Performed by: OBSTETRICS & GYNECOLOGY

## 2019-05-13 PROCEDURE — 88142 CYTOPATH C/V THIN LAYER: CPT

## 2019-05-13 PROCEDURE — 77067 SCR MAMMO BI INCL CAD: CPT | Mod: 26,,, | Performed by: RADIOLOGY

## 2019-05-13 PROCEDURE — 77063 MAMMO DIGITAL SCREENING BILAT WITH TOMOSYNTHESIS_CAD: ICD-10-PCS | Mod: 26,,, | Performed by: RADIOLOGY

## 2019-05-13 PROCEDURE — 99999 PR PBB SHADOW E&M-EST. PATIENT-LVL III: ICD-10-PCS | Mod: PBBFAC,,, | Performed by: OBSTETRICS & GYNECOLOGY

## 2019-05-13 PROCEDURE — 77067 SCR MAMMO BI INCL CAD: CPT | Mod: TC,PN

## 2019-05-13 PROCEDURE — 99386 PR PREVENTIVE VISIT,NEW,40-64: ICD-10-PCS | Mod: S$GLB,,, | Performed by: OBSTETRICS & GYNECOLOGY

## 2019-05-13 PROCEDURE — 87624 HPV HI-RISK TYP POOLED RSLT: CPT

## 2019-05-13 PROCEDURE — 3074F PR MOST RECENT SYSTOLIC BLOOD PRESSURE < 130 MM HG: ICD-10-PCS | Mod: CPTII,S$GLB,, | Performed by: OBSTETRICS & GYNECOLOGY

## 2019-05-13 PROCEDURE — 99999 PR PBB SHADOW E&M-EST. PATIENT-LVL III: CPT | Mod: PBBFAC,,, | Performed by: OBSTETRICS & GYNECOLOGY

## 2019-05-13 PROCEDURE — 3074F SYST BP LT 130 MM HG: CPT | Mod: CPTII,S$GLB,, | Performed by: OBSTETRICS & GYNECOLOGY

## 2019-05-13 PROCEDURE — 77063 BREAST TOMOSYNTHESIS BI: CPT | Mod: 26,,, | Performed by: RADIOLOGY

## 2019-05-13 PROCEDURE — 77067 MAMMO DIGITAL SCREENING BILAT WITH TOMOSYNTHESIS_CAD: ICD-10-PCS | Mod: 26,,, | Performed by: RADIOLOGY

## 2019-05-13 PROCEDURE — 3079F DIAST BP 80-89 MM HG: CPT | Mod: CPTII,S$GLB,, | Performed by: OBSTETRICS & GYNECOLOGY

## 2019-05-13 PROCEDURE — 99386 PREV VISIT NEW AGE 40-64: CPT | Mod: S$GLB,,, | Performed by: OBSTETRICS & GYNECOLOGY

## 2019-05-13 RX ORDER — HYOSCYAMINE SULFATE 0.125 MG
125 TABLET ORAL EVERY 4 HOURS PRN
Refills: 0 | COMMUNITY
Start: 2019-04-29 | End: 2019-06-26 | Stop reason: CLARIF

## 2019-05-13 NOTE — PROGRESS NOTES
Subjective:       Patient ID: Haydee Allen is a 44 y.o. female.    Chief Complaint:  Well Woman; Ovarian Cyst; and Pelvic Pain      History of Present Illness  HPI  44 y.o.  Ab1 for annual exam. Patient reports mid to RUQ pain for at least 1 year.Patient reports having negative evaluations in the ER.    GYN & OB History  LMP: 19. Menses reported as regular monthly lasting up to 5 days.  Patient reports  x 2 and  section x 2 with surgical sterilization.  Date of Last Pap: No result found    OB History   No data available       Review of Systems  Review of Systems   Constitutional: Negative for activity change, appetite change, chills, diaphoresis, fatigue, fever and unexpected weight change.   HENT: Negative for nasal congestion, mouth sores and tinnitus.    Eyes: Negative for discharge and visual disturbance.   Respiratory: Negative for cough, shortness of breath and wheezing.    Cardiovascular: Negative for chest pain, palpitations and leg swelling.   Gastrointestinal: Positive for constipation. Negative for abdominal pain, bloating, blood in stool, diarrhea, nausea, vomiting, reflux and fecal incontinence.   Endocrine: Positive for diabetes and hypothyroidism. Negative for hair loss, hot flashes and hyperthyroidism.   Genitourinary: Negative for decreased libido, dysmenorrhea, dyspareunia, dysuria, flank pain, frequency, genital sores, hematuria, hot flashes, menorrhagia, menstrual problem, pelvic pain, urgency, vaginal bleeding, vaginal discharge, vaginal pain, urinary incontinence, postcoital bleeding, postmenopausal bleeding, vaginal dryness and vaginal odor.   Musculoskeletal: Negative for arthralgias, back pain, joint swelling, leg pain and myalgias.   Integumentary:  Negative for rash, acne, hair changes, mole/lesion, breast mass, nipple discharge, breast skin changes and breast tenderness.   Neurological: Negative for vertigo, seizures, syncope, numbness and headaches.    Hematological: Negative for adenopathy. Does not bruise/bleed easily.   Psychiatric/Behavioral: Positive for depression. Negative for sleep disturbance. The patient is nervous/anxious.    Breast: Negative for asymmetry, breast self exam, lump, mass, mastodynia, nipple discharge, skin changes and tenderness          Objective:    Physical Exam:   Constitutional: She is oriented to person, place, and time. She appears well-developed and well-nourished. No distress.    HENT:   Head: Normocephalic.   Nose: No epistaxis.    Eyes: Pupils are equal, round, and reactive to light.    Neck: Normal range of motion.    Cardiovascular: Normal rate.     Pulmonary/Chest: Effort normal.   Breasts: no palpable masses or nipple discharge        Abdominal: Soft. There is tenderness.     Genitourinary: Vagina normal.   Genitourinary Comments: Pap smear done of cervix. Uterus is anteverted but difficult to outline due to body habitus.           Musculoskeletal: Normal range of motion and moves all extremeties.       Neurological: She is alert and oriented to person, place, and time.    Skin: Skin is warm and dry.    Psychiatric: She has a normal mood and affect. Her behavior is normal. Judgment and thought content normal.          Assessment:        1. Pelvic pain    2. Screening mammogram, encounter for    3. Encounter for gynecological examination    4. Encounter for well woman exam with routine gynecological exam    5. Pain localized to upper abdomen                Plan:      Mammogram today  Pelvic ultrasound after onset of next menses.

## 2019-05-15 LAB
HPV HR 12 DNA CVX QL NAA+PROBE: NEGATIVE
HPV16 AG SPEC QL: NEGATIVE
HPV18 DNA SPEC QL NAA+PROBE: NEGATIVE

## 2019-05-16 LAB
ALBUMIN SERPL-MCNC: 4 G/DL (ref 3.6–5.1)
ALBUMIN/CREAT UR: 14 MCG/MG CREAT
ALBUMIN/GLOB SERPL: 1.4 (CALC) (ref 1–2.5)
ALP SERPL-CCNC: 56 U/L (ref 33–115)
ALT SERPL-CCNC: 6 U/L (ref 6–29)
APPEARANCE UR: CLEAR
AST SERPL-CCNC: 7 U/L (ref 10–35)
BASOPHILS # BLD AUTO: 51 CELLS/UL (ref 0–200)
BASOPHILS NFR BLD AUTO: 0.7 %
BILIRUB SERPL-MCNC: 0.3 MG/DL (ref 0.2–1.2)
BILIRUB UR QL STRIP: NEGATIVE
BUN SERPL-MCNC: 11 MG/DL (ref 7–25)
BUN/CREAT SERPL: ABNORMAL (CALC) (ref 6–22)
CALCIUM SERPL-MCNC: 8.6 MG/DL (ref 8.6–10.2)
CHLORIDE SERPL-SCNC: 102 MMOL/L (ref 98–110)
CHOLEST SERPL-MCNC: 174 MG/DL
CHOLEST/HDLC SERPL: 4 (CALC)
CO2 SERPL-SCNC: 27 MMOL/L (ref 20–32)
COLOR UR: ABNORMAL
CREAT SERPL-MCNC: 0.61 MG/DL (ref 0.5–1.1)
CREAT UR-MCNC: 108 MG/DL (ref 20–275)
EOSINOPHIL # BLD AUTO: 394 CELLS/UL (ref 15–500)
EOSINOPHIL NFR BLD AUTO: 5.4 %
ERYTHROCYTE [DISTWIDTH] IN BLOOD BY AUTOMATED COUNT: 19.3 % (ref 11–15)
GFRSERPLBLD MDRD-ARVRAT: 109 ML/MIN/1.73M2
GLOBULIN SER CALC-MCNC: 2.9 G/DL (CALC) (ref 1.9–3.7)
GLUCOSE SERPL-MCNC: 244 MG/DL (ref 65–99)
GLUCOSE UR QL STRIP: ABNORMAL
HBA1C MFR BLD: 10.8 % OF TOTAL HGB
HCT VFR BLD AUTO: 28 % (ref 35–45)
HDLC SERPL-MCNC: 44 MG/DL
HGB BLD-MCNC: 7.2 G/DL (ref 11.7–15.5)
HGB UR QL STRIP: NEGATIVE
KETONES UR QL STRIP: ABNORMAL
LDLC SERPL CALC-MCNC: 107 MG/DL (CALC)
LEUKOCYTE ESTERASE UR QL STRIP: NEGATIVE
LYMPHOCYTES # BLD AUTO: 1533 CELLS/UL (ref 850–3900)
LYMPHOCYTES NFR BLD AUTO: 21 %
MCH RBC QN AUTO: 16.3 PG (ref 27–33)
MCHC RBC AUTO-ENTMCNC: 25.7 G/DL (ref 32–36)
MCV RBC AUTO: 63.3 FL (ref 80–100)
MICROALBUMIN UR-MCNC: 1.5 MG/DL
MONOCYTES # BLD AUTO: 423 CELLS/UL (ref 200–950)
MONOCYTES NFR BLD AUTO: 5.8 %
MORPHOLOGY BLD-IMP: NORMAL
NEUTROPHILS # BLD AUTO: 4898 CELLS/UL (ref 1500–7800)
NEUTROPHILS NFR BLD AUTO: 67.1 %
NITRITE UR QL STRIP: NEGATIVE
NONHDLC SERPL-MCNC: 130 MG/DL (CALC)
PH UR STRIP: 7.5 [PH] (ref 5–8)
PLATELET # BLD AUTO: 252 THOUSAND/UL (ref 140–400)
PMV BLD REES-ECKER: ABNORMAL FL
POTASSIUM SERPL-SCNC: 4.2 MMOL/L (ref 3.5–5.3)
PROT SERPL-MCNC: 6.9 G/DL (ref 6.1–8.1)
PROT UR QL STRIP: NEGATIVE
RBC # BLD AUTO: 4.42 MILLION/UL (ref 3.8–5.1)
SODIUM SERPL-SCNC: 137 MMOL/L (ref 135–146)
SP GR UR STRIP: 1.02 (ref 1–1.03)
TRIGL SERPL-MCNC: 115 MG/DL
TSH SERPL-ACNC: 18.19 MIU/L
WBC # BLD AUTO: 7.3 THOUSAND/UL (ref 3.8–10.8)

## 2019-05-17 ENCOUNTER — TELEPHONE (OUTPATIENT)
Dept: RADIOLOGY | Facility: HOSPITAL | Age: 44
End: 2019-05-17

## 2019-05-20 ENCOUNTER — TELEPHONE (OUTPATIENT)
Dept: FAMILY MEDICINE | Facility: CLINIC | Age: 44
End: 2019-05-20

## 2019-05-20 ENCOUNTER — OFFICE VISIT (OUTPATIENT)
Dept: FAMILY MEDICINE | Facility: CLINIC | Age: 44
End: 2019-05-20
Payer: COMMERCIAL

## 2019-05-20 VITALS
HEIGHT: 66 IN | SYSTOLIC BLOOD PRESSURE: 120 MMHG | HEART RATE: 90 BPM | DIASTOLIC BLOOD PRESSURE: 80 MMHG | BODY MASS INDEX: 39.05 KG/M2 | WEIGHT: 243 LBS | OXYGEN SATURATION: 97 %

## 2019-05-20 DIAGNOSIS — D64.9 ANEMIA, UNSPECIFIED TYPE: ICD-10-CM

## 2019-05-20 DIAGNOSIS — K21.9 GASTROESOPHAGEAL REFLUX DISEASE WITHOUT ESOPHAGITIS: ICD-10-CM

## 2019-05-20 DIAGNOSIS — E11.9 TYPE 2 DIABETES MELLITUS WITHOUT COMPLICATION, WITH LONG-TERM CURRENT USE OF INSULIN: ICD-10-CM

## 2019-05-20 DIAGNOSIS — D35.01 ADENOMA OF RIGHT ADRENAL GLAND: ICD-10-CM

## 2019-05-20 DIAGNOSIS — Z79.4 TYPE 2 DIABETES MELLITUS WITHOUT COMPLICATION, WITH LONG-TERM CURRENT USE OF INSULIN: ICD-10-CM

## 2019-05-20 DIAGNOSIS — E03.9 ACQUIRED HYPOTHYROIDISM: Primary | ICD-10-CM

## 2019-05-20 PROCEDURE — 3008F BODY MASS INDEX DOCD: CPT | Mod: ,,, | Performed by: NURSE PRACTITIONER

## 2019-05-20 PROCEDURE — 3079F PR MOST RECENT DIASTOLIC BLOOD PRESSURE 80-89 MM HG: ICD-10-PCS | Mod: ,,, | Performed by: NURSE PRACTITIONER

## 2019-05-20 PROCEDURE — 99214 PR OFFICE/OUTPT VISIT, EST, LEVL IV, 30-39 MIN: ICD-10-PCS | Mod: ,,, | Performed by: NURSE PRACTITIONER

## 2019-05-20 PROCEDURE — 3074F PR MOST RECENT SYSTOLIC BLOOD PRESSURE < 130 MM HG: ICD-10-PCS | Mod: ,,, | Performed by: NURSE PRACTITIONER

## 2019-05-20 PROCEDURE — 3079F DIAST BP 80-89 MM HG: CPT | Mod: ,,, | Performed by: NURSE PRACTITIONER

## 2019-05-20 PROCEDURE — 99214 OFFICE O/P EST MOD 30 MIN: CPT | Mod: ,,, | Performed by: NURSE PRACTITIONER

## 2019-05-20 PROCEDURE — 3008F PR BODY MASS INDEX (BMI) DOCUMENTED: ICD-10-PCS | Mod: ,,, | Performed by: NURSE PRACTITIONER

## 2019-05-20 PROCEDURE — 3045F PR MOST RECENT HEMOGLOBIN A1C LEVEL 7.0-9.0%: ICD-10-PCS | Mod: ,,, | Performed by: NURSE PRACTITIONER

## 2019-05-20 PROCEDURE — 3074F SYST BP LT 130 MM HG: CPT | Mod: ,,, | Performed by: NURSE PRACTITIONER

## 2019-05-20 PROCEDURE — 3045F PR MOST RECENT HEMOGLOBIN A1C LEVEL 7.0-9.0%: CPT | Mod: ,,, | Performed by: NURSE PRACTITIONER

## 2019-05-20 RX ORDER — LEVOTHYROXINE SODIUM 25 UG/1
25 TABLET ORAL DAILY
Qty: 30 TABLET | Refills: 2 | Status: SHIPPED | OUTPATIENT
Start: 2019-05-20 | End: 2019-06-19 | Stop reason: SDUPTHER

## 2019-05-20 RX ORDER — ATORVASTATIN CALCIUM 10 MG/1
10 TABLET, FILM COATED ORAL DAILY
Qty: 90 TABLET | Refills: 0 | Status: SHIPPED | OUTPATIENT
Start: 2019-05-20 | End: 2019-11-30 | Stop reason: SDUPTHER

## 2019-05-20 RX ORDER — FERROUS SULFATE 325(65) MG
325 TABLET ORAL 2 TIMES DAILY
Qty: 60 TABLET | Refills: 3 | Status: SHIPPED | OUTPATIENT
Start: 2019-05-20 | End: 2019-12-19

## 2019-05-20 NOTE — PROGRESS NOTES
"    SUBJECTIVE:      Patient ID: Haydee Allen is a 44 y.o. female.    Chief Complaint: Diabetes and Abdominal Pain       is here today to f/u on DM and abdominal pain. She went to [facility] and was worked up for the abdominal pain. Right ovarian cyst noted on the CT, pt has seen Dr Chu and he has ordered a f/u ultrasound. Anemia noted on labs, pt says she has very heavy menstrual cycles. Currently taking iron supplements daily. Says her abdominal pain comes and goes, not progressively getting worse. UTD with EGD and colonoscopy.  Adrenal adenoma also noted on CT of abdomen. Will work up. TSH elevated on labs, will adjust medication. A1c 10.8: patient admits to not taking insulin daily. Only taking it about 3x a week when her blood sugar is "really high"     Diabetes   She presents for her follow-up diabetic visit. She has type 2 diabetes mellitus. Pertinent negatives for hypoglycemia include no pallor or speech difficulty. Pertinent negatives for diabetes include no weakness. There are no hypoglycemic complications. Symptoms are stable. Risk factors for coronary artery disease include obesity, diabetes mellitus and hypertension. Current diabetic treatment includes insulin injections and oral agent (monotherapy). She is compliant with treatment some of the time. Her weight is decreasing steadily. She is following a generally unhealthy diet. When asked about meal planning, she reported none. She participates in exercise intermittently. Her breakfast blood glucose is taken between 7-8 am. Her breakfast blood glucose range is generally 140-180 mg/dl. An ACE inhibitor/angiotensin II receptor blocker is being taken. She does not see a podiatrist.Eye exam is current.   Thyroid Problem   Presents for follow-up visit. Patient reports no cold intolerance, diaphoresis or heat intolerance. The symptoms have been stable.   Anemia   Presents for follow-up visit. There has been no bruising/bleeding easily, " light-headedness or pallor. Procedure history includes colonoscopy and EGD.       Past Surgical History:   Procedure Laterality Date     SECTION      THYROIDECTOMY, PARTIAL      TUBAL LIGATION       Family History   Problem Relation Age of Onset    Cancer Mother     Hypertension Mother     Diabetes Mother     Arthritis Mother     Heart disease Mother     Kidney disease Mother     Cancer Father       Social History     Socioeconomic History    Marital status: Single     Spouse name: Not on file    Number of children: Not on file    Years of education: Not on file    Highest education level: Not on file   Occupational History    Not on file   Social Needs    Financial resource strain: Not on file    Food insecurity:     Worry: Not on file     Inability: Not on file    Transportation needs:     Medical: Not on file     Non-medical: Not on file   Tobacco Use    Smoking status: Never Smoker    Smokeless tobacco: Never Used   Substance and Sexual Activity    Alcohol use: No    Drug use: No    Sexual activity: Never   Lifestyle    Physical activity:     Days per week: Not on file     Minutes per session: Not on file    Stress: Not on file   Relationships    Social connections:     Talks on phone: Not on file     Gets together: Not on file     Attends [employer] service: Not on file     Active member of club or organization: Not on file     Attends meetings of clubs or organizations: Not on file     Relationship status: Not on file   Other Topics Concern    Not on file   Social History Narrative    Not on file     Current Outpatient Medications   Medication Sig Dispense Refill    blood sugar diagnostic (TRUE METRIX GLUCOSE TEST STRIP MISC) True Metrix Glucose Test Strip   Use twice daily as directed      ferrous sulfate (FEOSOL) 325 mg (65 mg iron) Tab tablet Take 1 tablet (325 mg total) by mouth 2 (two) times daily. 60 tablet 3    insulin degludec-liraglutide (XULTOPHY 100/3.6) 100  "unit-3.6 mg /mL (3 mL) InPn Inject 16 Units into the skin once daily. 3 mL 2    levothyroxine (SYNTHROID) 300 MCG Tab Take 1 tablet (300 mcg total) by mouth before breakfast. 30 tablet 11    lisinopril (PRINIVIL,ZESTRIL) 20 MG tablet Take 1 tablet (20 mg total) by mouth once daily. 30 tablet 1    metFORMIN (GLUCOPHAGE) 1000 MG tablet TAKE 1 TABLET BY MOUTH TWICE DAILY WITH MEALS 180 tablet 0    pen needle, diabetic (BD ULTRA-FINE SHORT PEN NEEDLE) 31 gauge x 5/16" Ndle BD Ultra-Fine Short Pen Needle 31 gauge x 5/16"   Use daily with toujeo      atorvastatin (LIPITOR) 10 MG tablet Take 1 tablet (10 mg total) by mouth once daily. 90 tablet 0    hyoscyamine (ANASPAZ,LEVSIN) 0.125 mg Tab   0    levothyroxine (SYNTHROID) 25 MCG tablet Take 1 tablet (25 mcg total) by mouth once daily. 30 tablet 2    ranitidine (ZANTAC) 150 MG tablet Take 1 tablet (150 mg total) by mouth 2 (two) times daily. 60 tablet 1     No current facility-administered medications for this visit.      Review of patient's allergies indicates:  No Known Allergies   Past Medical History:   Diagnosis Date    Anemia     Anxiety     Arthritis     Diabetes mellitus     Diabetes mellitus, type 2     Encounter for blood transfusion     2    Hypertension     Obesity     Thyroid disease      Past Surgical History:   Procedure Laterality Date     SECTION      THYROIDECTOMY, PARTIAL      TUBAL LIGATION         Review of Systems   Constitutional: Negative for appetite change, chills, diaphoresis and unexpected weight change.   HENT: Negative for ear discharge, hearing loss, trouble swallowing and voice change.    Eyes: Negative for photophobia and pain.   Respiratory: Negative for chest tightness and stridor.    Gastrointestinal: Negative for blood in stool.   Endocrine: Negative for cold intolerance and heat intolerance.   Genitourinary: Negative for difficulty urinating and flank pain.   Musculoskeletal: Negative for joint swelling and " "neck stiffness.   Skin: Negative for pallor.   Neurological: Negative for speech difficulty, weakness and light-headedness.   Hematological: Does not bruise/bleed easily.   Psychiatric/Behavioral: Negative for dysphoric mood.      OBJECTIVE:      Vitals:    05/20/19 0845   BP: 120/80   Pulse: 90   SpO2: 97%   Weight: 110.2 kg (243 lb)   Height: 5' 6" (1.676 m)     Physical Exam   Constitutional: She is oriented to person, place, and time. She appears well-developed and well-nourished.   HENT:   Head: Atraumatic.   Eyes: Conjunctivae are normal.   Neck: Neck supple.   Cardiovascular: Normal rate, regular rhythm, normal heart sounds and intact distal pulses.   Pulmonary/Chest: Effort normal and breath sounds normal.   Abdominal: Soft. Bowel sounds are normal. She exhibits no distension.   Musculoskeletal: Normal range of motion.   Neurological: She is alert and oriented to person, place, and time.   Skin: Skin is warm and dry.   Psychiatric: She has a normal mood and affect.   Nursing note and vitals reviewed.     Assessment:       1. Acquired hypothyroidism    2. Type 2 diabetes mellitus without complication, with long-term current use of insulin    3. Anemia, unspecified type    4. Gastroesophageal reflux disease without esophagitis    5. Adenoma of right adrenal gland        Plan:       Acquired hypothyroidism  -     levothyroxine (SYNTHROID) 25 MCG tablet; Take 1 tablet (25 mcg total) by mouth once daily.  Dispense: 30 tablet; Refill: 2  Increase synthroid to 325mcg daily  -     TSH; Future; Expected date: 05/20/2019    Type 2 diabetes mellitus without complication, with long-term current use of insulin  -     Hemoglobin A1c; Future; Expected date: 05/20/2019  -  start   atorvastatin (LIPITOR) 10 MG tablet; Take 1 tablet (10 mg total) by mouth once daily.  Dispense: 90 tablet; Refill: 0    Anemia, unspecified type  -   increase  ferrous sulfate (FEOSOL) 325 mg (65 mg iron) Tab tablet; Take 1 tablet (325 mg total) " by mouth 2 (two) times daily.  Dispense: 60 tablet; Refill: 3  -     Ambulatory referral to Obstetrics / Gynecology  -     CBC auto differential; Future; Expected date: 05/20/2019  -     Iron and TIBC; Future; Expected date: 05/20/2019  -     Ferritin; Future; Expected date: 05/20/2019    Gastroesophageal reflux disease without esophagitis  - start  ranitidine (ZANTAC) 150 MG tablet; Take 1 tablet (150 mg total) by mouth 2 (two) times daily.  Dispense: 60 tablet; Refill: 1    Adenoma of right adrenal gland  -     Aldosterone; Future; Expected date: 05/20/2019  -     DHEA; Future; Expected date: 05/20/2019  -     CORTISOL, FREE 24-HOUR URINE LC/MS/MS; Future  -     Cortisol; Future; Expected date: 05/20/2019        Follow up in about 8 weeks (around 7/15/2019) for dm.      5/20/2019 DIANA Hernandes, FNP

## 2019-05-20 NOTE — PATIENT INSTRUCTIONS
Tips to Control Acid Reflux    To control acid reflux, youll need to make some basic diet and lifestyle changes. The simple steps outlined below may be all youll need to ease discomfort.  Watch what you eat  · Avoid fatty foods and spicy foods.  · Eat fewer acidic foods, such as citrus and tomato-based foods. These can increase symptoms.  · Limit drinking alcohol, caffeine, and fizzy beverages. All increase acid reflux.  · Try limiting chocolate, peppermint, and spearmint. These can worsen acid reflux in some people.  Watch when you eat  · Avoid lying down for 3 hours after eating.  · Do not snack before going to bed.  Raise your head  Raising your head and upper body by 4 to 6 inches helps limit reflux when youre lying down. Put blocks under the head of your bed frame to raise it.  Other changes  · Lose weight, if you need to  · Dont exercise near bedtime  · Avoid tight-fitting clothes  · Limit aspirin and ibuprofen  · Stop smoking   Date Last Reviewed: 7/1/2016  © 4115-0565 [employer]. [address] [place] [address]. All rights reserved. This information is not intended as a substitute for professional medical care. Always follow your healthcare professional's instructions.        4 Steps for Eating Healthier  Changing the way you eat can improve your health. It can lower your cholesterol and blood pressure, and help you stay at a healthy weight. Your diet doesnt have to be bland and boring to be healthy. Just watch your calories and follow these steps:    1. Eat fewer unhealthy fats  · Choose more fish and lean meats instead of fatty cuts of meat.  · Skip butter and lard, and use less margarine.  · Pass on foods that have palm, coconut, or hydrogenated oils.  · Eat fewer high-fat dairy foods like cheese, ice cream, and whole milk.  · Get a heart-healthy cookbook and try some low-fat recipes.  2. Go light on salt  · Keep the saltshaker off the table.  · Limit high-salt ingredients,  such as soy sauce, bouillon, and garlic salt.  · Instead of adding salt when cooking, season your food with herbs and flavorings. Try lemon, garlic, and onion.  · Limit convenience foods, such as boxed or canned foods and restaurant food.  · Read food labels and choose lower-sodium options.  3. Limit sugar  · Pause before you add sugars to pancakes, cereal, coffee, or tea. This includes white and brown table sugar, syrup, honey, and molasses. Cut your usual amount by half.  · Use non-sugar sweeteners. Stevia, aspartame, and sucralose can satisfy a sweet tooth without adding calories.  · Swap out sugar-filled soda and other drinks. Buy sugar-free or low-calorie beverages. Remember water is always the best choice.  · Read labels and choose foods with less added sugar. Keep in mind that dairy foods and foods with fruit will have some natural sugar.  · Cut the sugar in recipes by 1/3 to 1/2. Boost the flavor with extracts like almond, vanilla, or orange. Or add spices such as cinnamon or nutmeg.  4. Eat more fiber  · Eat fresh fruits and vegetables every day.  · Boost your diet with whole grains. Go for oats, whole-grain rice, and bran.  · Add beans and lentils to your meals.  · Drink more water to match your fiber increase. This is to help prevent constipation.  Date Last Reviewed: 5/11/2015  © 3277-2767 [employer]. [address] [place] [address], [place] [address]. All rights reserved. This information is not intended as a substitute for professional medical care. Always follow your healthcare professional's instructions.

## 2019-05-20 NOTE — Clinical Note
was anemic on recent labs. She is currently on iron supplements. She complains of heavy menstrual cycles and said she did not discuss it with you at her recent visit so I wanted to update you

## 2019-05-20 NOTE — TELEPHONE ENCOUNTER
Contact pt about lab orders sent to [email or web address]. She should call them for any special directions before having them done. [facility] sent in atorvastatin for pt to get started on. Referral sent to Dr Geoffrey DOMINGUEZ, pt should call and schedule ov to f/u on u/s. Dr Hale wants to see her 2 days after u/s is done, this is to check up on her anemia.

## 2019-05-31 ENCOUNTER — OFFICE VISIT (OUTPATIENT)
Dept: OBSTETRICS AND GYNECOLOGY | Facility: CLINIC | Age: 44
End: 2019-05-31
Payer: COMMERCIAL

## 2019-05-31 VITALS
WEIGHT: 242.06 LBS | SYSTOLIC BLOOD PRESSURE: 124 MMHG | HEIGHT: 66 IN | DIASTOLIC BLOOD PRESSURE: 80 MMHG | BODY MASS INDEX: 38.9 KG/M2

## 2019-05-31 DIAGNOSIS — R92.8 ABNORMAL SCREENING MAMMOGRAM: Primary | ICD-10-CM

## 2019-05-31 PROCEDURE — 3079F DIAST BP 80-89 MM HG: CPT | Mod: CPTII,S$GLB,, | Performed by: OBSTETRICS & GYNECOLOGY

## 2019-05-31 PROCEDURE — 3074F SYST BP LT 130 MM HG: CPT | Mod: CPTII,S$GLB,, | Performed by: OBSTETRICS & GYNECOLOGY

## 2019-05-31 PROCEDURE — 3074F PR MOST RECENT SYSTOLIC BLOOD PRESSURE < 130 MM HG: ICD-10-PCS | Mod: CPTII,S$GLB,, | Performed by: OBSTETRICS & GYNECOLOGY

## 2019-05-31 PROCEDURE — 3079F PR MOST RECENT DIASTOLIC BLOOD PRESSURE 80-89 MM HG: ICD-10-PCS | Mod: CPTII,S$GLB,, | Performed by: OBSTETRICS & GYNECOLOGY

## 2019-05-31 PROCEDURE — 3008F BODY MASS INDEX DOCD: CPT | Mod: CPTII,S$GLB,, | Performed by: OBSTETRICS & GYNECOLOGY

## 2019-05-31 PROCEDURE — 99999 PR PBB SHADOW E&M-EST. PATIENT-LVL III: CPT | Mod: PBBFAC,,, | Performed by: OBSTETRICS & GYNECOLOGY

## 2019-05-31 PROCEDURE — 99999 PR PBB SHADOW E&M-EST. PATIENT-LVL III: ICD-10-PCS | Mod: PBBFAC,,, | Performed by: OBSTETRICS & GYNECOLOGY

## 2019-05-31 PROCEDURE — 99213 PR OFFICE/OUTPT VISIT, EST, LEVL III, 20-29 MIN: ICD-10-PCS | Mod: S$GLB,,, | Performed by: OBSTETRICS & GYNECOLOGY

## 2019-05-31 PROCEDURE — 99213 OFFICE O/P EST LOW 20 MIN: CPT | Mod: S$GLB,,, | Performed by: OBSTETRICS & GYNECOLOGY

## 2019-05-31 PROCEDURE — 3008F PR BODY MASS INDEX (BMI) DOCUMENTED: ICD-10-PCS | Mod: CPTII,S$GLB,, | Performed by: OBSTETRICS & GYNECOLOGY

## 2019-05-31 NOTE — PROGRESS NOTES
Subjective:       Patient ID: Haydee Allen is a 44 y.o. female.    Chief Complaint:  Results      History of Present Illness  HPI  44 y.o.  Ab1 for consultation regarding need to get diagnostic mammogram. Patient did not provide a way to contact her with results.Patient with no complaints today. Patient reminded to schedule pelvic ultrasound after onset of next menses with then follow up at my office to go over the results.    GYN & OB History  Patient's last menstrual period was 2019.   Date of Last Pap: 2019    OB History    Para Term  AB Living   4 4 4         SAB TAB Ectopic Multiple Live Births                  # Outcome Date GA Lbr Fidencio/2nd Weight Sex Delivery Anes PTL Lv   4 Term            3 Term            2 Term            1 Term                Review of Systems  Review of Systems   Constitutional: Negative for activity change, appetite change, chills, diaphoresis, fatigue, fever and unexpected weight change.   HENT: Negative for nasal congestion, mouth sores and tinnitus.    Eyes: Negative for discharge and visual disturbance.   Respiratory: Negative for cough, shortness of breath and wheezing.    Cardiovascular: Negative for chest pain, palpitations and leg swelling.   Gastrointestinal: Positive for abdominal pain. Negative for bloating, blood in stool, constipation, diarrhea, nausea, vomiting, reflux and fecal incontinence.   Endocrine: Positive for diabetes and hypothyroidism. Negative for hair loss, hot flashes and hyperthyroidism.   Genitourinary: Negative for bladder incontinence, decreased libido, dysmenorrhea, dyspareunia, dysuria, flank pain, frequency, genital sores, hematuria, hot flashes, menorrhagia, menstrual problem, pelvic pain, urgency, vaginal bleeding, vaginal discharge, vaginal pain, urinary incontinence, postcoital bleeding, postmenopausal bleeding, vaginal dryness and vaginal odor.   Musculoskeletal: Negative for arthralgias, back pain, joint  swelling, leg pain and myalgias.   Integumentary:  Negative for rash, acne, hair changes, mole/lesion, breast mass, nipple discharge, breast skin changes and breast tenderness.   Neurological: Negative for vertigo, seizures, syncope, numbness and headaches.   Hematological: Negative for adenopathy. Does not bruise/bleed easily.   Psychiatric/Behavioral: Negative for depression and sleep disturbance. The patient is nervous/anxious.    Breast: Negative for asymmetry, breast self exam, lump, mass, mastodynia, nipple discharge, skin changes and tenderness          Objective:    Physical Exam:   Constitutional: She is oriented to person, place, and time. She appears well-developed and well-nourished. No distress.    HENT:   Head: Normocephalic.   Nose: No epistaxis.    Eyes: Pupils are equal, round, and reactive to light.    Neck: Normal range of motion.    Cardiovascular: Normal rate.     Pulmonary/Chest: Effort normal.          Genitourinary:   Genitourinary Comments: deferred           Musculoskeletal: Normal range of motion and moves all extremeties.       Neurological: She is alert and oriented to person, place, and time.    Skin: Skin is warm and dry. She is not diaphoretic.    Psychiatric: She has a normal mood and affect. Her behavior is normal. Judgment and thought content normal.          Assessment:     Abnormal screening mammogram  Abdominal pain           Plan:      Diagnostic mammogram  Pelvic ultrasound after onset of next menses and then in office follow up with those results

## 2019-06-13 ENCOUNTER — HOSPITAL ENCOUNTER (OUTPATIENT)
Dept: RADIOLOGY | Facility: HOSPITAL | Age: 44
Discharge: HOME | End: 2019-06-13
Attending: OBSTETRICS & GYNECOLOGY
Payer: COMMERCIAL

## 2019-06-13 DIAGNOSIS — R92.8 ABNORMAL MAMMOGRAM: ICD-10-CM

## 2019-06-13 DIAGNOSIS — R10.2 PELVIC PAIN: ICD-10-CM

## 2019-06-13 PROCEDURE — 76830 TRANSVAGINAL US NON-OB: CPT | Mod: 26,,, | Performed by: RADIOLOGY

## 2019-06-13 PROCEDURE — 76856 US PELVIS COMP WITH TRANSVAG NON-OB (XPD): ICD-10-PCS | Mod: 26,,, | Performed by: RADIOLOGY

## 2019-06-13 PROCEDURE — 77066 DX MAMMO INCL CAD BI: CPT | Mod: 26,,, | Performed by: RADIOLOGY

## 2019-06-13 PROCEDURE — 76856 US EXAM PELVIC COMPLETE: CPT | Mod: 26,,, | Performed by: RADIOLOGY

## 2019-06-13 PROCEDURE — 76642 ULTRASOUND BREAST LIMITED: CPT | Mod: 26,,, | Performed by: RADIOLOGY

## 2019-06-13 PROCEDURE — 77062 BREAST TOMOSYNTHESIS BI: CPT | Mod: TC

## 2019-06-13 PROCEDURE — 77062 MAMMO DIGITAL DIAGNOSTIC BILAT WITH TOMOSYNTHESIS_CAD: ICD-10-PCS | Mod: 26,,, | Performed by: RADIOLOGY

## 2019-06-13 PROCEDURE — 77062 BREAST TOMOSYNTHESIS BI: CPT | Mod: 26,,, | Performed by: RADIOLOGY

## 2019-06-13 PROCEDURE — 76642 ULTRASOUND BREAST LIMITED: CPT | Mod: TC,50

## 2019-06-13 PROCEDURE — 76830 US PELVIS COMP WITH TRANSVAG NON-OB (XPD): ICD-10-PCS | Mod: 26,,, | Performed by: RADIOLOGY

## 2019-06-13 PROCEDURE — 77066 MAMMO DIGITAL DIAGNOSTIC BILAT WITH TOMOSYNTHESIS_CAD: ICD-10-PCS | Mod: 26,,, | Performed by: RADIOLOGY

## 2019-06-13 PROCEDURE — 76642 US BREAST BILATERAL LIMITED: ICD-10-PCS | Mod: 26,,, | Performed by: RADIOLOGY

## 2019-06-13 PROCEDURE — 76830 TRANSVAGINAL US NON-OB: CPT | Mod: TC

## 2019-06-17 ENCOUNTER — TELEPHONE (OUTPATIENT)
Dept: FAMILY MEDICINE | Facility: CLINIC | Age: 44
End: 2019-06-17

## 2019-06-17 ENCOUNTER — OFFICE VISIT (OUTPATIENT)
Dept: OBSTETRICS AND GYNECOLOGY | Facility: CLINIC | Age: 44
End: 2019-06-17
Payer: COMMERCIAL

## 2019-06-17 VITALS
DIASTOLIC BLOOD PRESSURE: 84 MMHG | WEIGHT: 246.25 LBS | BODY MASS INDEX: 39.58 KG/M2 | SYSTOLIC BLOOD PRESSURE: 130 MMHG | HEIGHT: 66 IN

## 2019-06-17 DIAGNOSIS — N92.0 MENORRHAGIA WITH REGULAR CYCLE: Primary | ICD-10-CM

## 2019-06-17 DIAGNOSIS — Z01.818 PRE-OP TESTING: ICD-10-CM

## 2019-06-17 DIAGNOSIS — N83.291 COMPLEX CYST OF RIGHT OVARY: ICD-10-CM

## 2019-06-17 PROCEDURE — 3008F PR BODY MASS INDEX (BMI) DOCUMENTED: ICD-10-PCS | Mod: CPTII,S$GLB,, | Performed by: OBSTETRICS & GYNECOLOGY

## 2019-06-17 PROCEDURE — 3075F SYST BP GE 130 - 139MM HG: CPT | Mod: CPTII,S$GLB,, | Performed by: OBSTETRICS & GYNECOLOGY

## 2019-06-17 PROCEDURE — 3008F BODY MASS INDEX DOCD: CPT | Mod: CPTII,S$GLB,, | Performed by: OBSTETRICS & GYNECOLOGY

## 2019-06-17 PROCEDURE — 3079F PR MOST RECENT DIASTOLIC BLOOD PRESSURE 80-89 MM HG: ICD-10-PCS | Mod: CPTII,S$GLB,, | Performed by: OBSTETRICS & GYNECOLOGY

## 2019-06-17 PROCEDURE — 99999 PR PBB SHADOW E&M-EST. PATIENT-LVL III: ICD-10-PCS | Mod: PBBFAC,,, | Performed by: OBSTETRICS & GYNECOLOGY

## 2019-06-17 PROCEDURE — 3075F PR MOST RECENT SYSTOLIC BLOOD PRESS GE 130-139MM HG: ICD-10-PCS | Mod: CPTII,S$GLB,, | Performed by: OBSTETRICS & GYNECOLOGY

## 2019-06-17 PROCEDURE — 3079F DIAST BP 80-89 MM HG: CPT | Mod: CPTII,S$GLB,, | Performed by: OBSTETRICS & GYNECOLOGY

## 2019-06-17 PROCEDURE — 99214 PR OFFICE/OUTPT VISIT, EST, LEVL IV, 30-39 MIN: ICD-10-PCS | Mod: S$GLB,,, | Performed by: OBSTETRICS & GYNECOLOGY

## 2019-06-17 PROCEDURE — 99214 OFFICE O/P EST MOD 30 MIN: CPT | Mod: S$GLB,,, | Performed by: OBSTETRICS & GYNECOLOGY

## 2019-06-17 PROCEDURE — 99999 PR PBB SHADOW E&M-EST. PATIENT-LVL III: CPT | Mod: PBBFAC,,, | Performed by: OBSTETRICS & GYNECOLOGY

## 2019-06-17 NOTE — TELEPHONE ENCOUNTER
Pt notified of results. ER refused pt last night. Set up for pt to get Transfusion with [facility] tomorrow morning. They will be calling the pt to give directions and time to come in. Faxed over lab results and orders to scheduling.

## 2019-06-17 NOTE — PROGRESS NOTES
Subjective:       Patient ID: Haydee Allen is a 44 y.o. female.    Chief Complaint:  Results      History of Present Illness  HPI  44 y.o.  Ab1 for follow up after pelvic ultrasound.Ultrasound revealed 12 cm uterus with apparent thickened endometrium and complex 4.7 cm right adnexal cystic mass. Patient reports menses to be regular monthly and no intermenstrual bleeding. Patient does admit to passage of large clots with menses. Patient is to get blood transfusion due to severe anemia. The anemia workup is reportedly negative except for heavy menstrual bleeding. Patient agrees with need to remove uterus and adnexa. Patient had surgical sterilization with 2nd  section.    GYN & OB History  Patient's last menstrual period was 2019.   Date of Last Pap: 2019    OB History    Para Term  AB Living   4 4 4         SAB TAB Ectopic Multiple Live Births                  # Outcome Date GA Lbr Fidencio/2nd Weight Sex Delivery Anes PTL Lv   4 Term            3 Term            2 Term            1 Term                Review of Systems  Review of Systems   Constitutional: Negative for activity change, appetite change, chills, diaphoresis, fatigue, fever and unexpected weight change.   HENT: Negative for nasal congestion, mouth sores and tinnitus.    Eyes: Negative for discharge and visual disturbance.   Respiratory: Negative for cough, shortness of breath and wheezing.    Cardiovascular: Negative for chest pain, palpitations and leg swelling.   Gastrointestinal: Negative for abdominal pain, bloating, blood in stool, constipation, diarrhea, nausea, vomiting, reflux and fecal incontinence.   Endocrine: Positive for diabetes and hypothyroidism. Negative for hair loss, hot flashes and hyperthyroidism.   Genitourinary: Positive for menorrhagia. Negative for bladder incontinence, decreased libido, dysmenorrhea, dyspareunia, dysuria, flank pain, frequency, genital sores, hematuria, hot flashes,  menstrual problem, pelvic pain, urgency, vaginal bleeding, vaginal discharge, vaginal pain, urinary incontinence, postcoital bleeding, postmenopausal bleeding, vaginal dryness and vaginal odor.   Musculoskeletal: Negative for arthralgias, back pain, joint swelling, leg pain and myalgias.   Integumentary:  Negative for rash, acne, hair changes, mole/lesion, breast mass, nipple discharge, breast skin changes and breast tenderness.   Neurological: Negative for vertigo, seizures, syncope, numbness and headaches.   Hematological: Negative for adenopathy. Does not bruise/bleed easily.   Psychiatric/Behavioral: Negative for depression and sleep disturbance. The patient is not nervous/anxious.    Breast: Negative for asymmetry, breast self exam, lump, mass, mastodynia, nipple discharge, skin changes and tenderness          Objective:    Physical Exam:   Constitutional: She is oriented to person, place, and time. She appears well-developed and well-nourished. No distress.    HENT:   Head: Normocephalic.   Nose: No epistaxis.    Eyes: Pupils are equal, round, and reactive to light.    Neck: Normal range of motion.    Cardiovascular: Normal rate.     Pulmonary/Chest: Effort normal.          Genitourinary:   Genitourinary Comments: deferred           Musculoskeletal: Normal range of motion and moves all extremeties.       Neurological: She is alert and oriented to person, place, and time.    Skin: Skin is warm and dry. She is not diaphoretic.    Psychiatric: She has a normal mood and affect. Her behavior is normal. Judgment and thought content normal.          Assessment:        1. Menorrhagia with regular cycle    2. Complex cyst of right ovary                Plan:      TLH with [facility] at [facility]  Procedure,alternatives,and risks discussed and consents obtained

## 2019-06-17 NOTE — TELEPHONE ENCOUNTER
----- Message from Dalton Navas NP sent at 6/17/2019  7:39 AM CDT -----  tsh elevated, increase synthroid to 50mcg. H&H low, critical lab call received yesterday while I was on call and the pt was sent to the ER for transfusion. She has a f/u with Dr Garcia today. She has a f/u here to discuss all lab results. Please send a copy to Dr Garcia.

## 2019-06-18 ENCOUNTER — TELEPHONE (OUTPATIENT)
Dept: FAMILY MEDICINE | Facility: CLINIC | Age: 44
End: 2019-06-18

## 2019-06-18 DIAGNOSIS — E03.9 ACQUIRED HYPOTHYROIDISM: ICD-10-CM

## 2019-06-18 LAB
HCT VFR BLD AUTO: 30.5 % (ref 36–48)
HGB BLD-MCNC: 8.6 G/DL (ref 12–15)
MCH RBC QN AUTO: 18.1 PG (ref 25–35)
MCHC RBC AUTO-ENTMCNC: 28.2 G/DL (ref 31–36)
MCV RBC AUTO: 64.2 FL (ref 79–98)
NUCLEATED RBCS: 0 %
PLATELET # BLD AUTO: 282 K/UL (ref 140–440)
RBC # BLD AUTO: 4.75 M/UL (ref 3.5–5.5)
WBC # BLD AUTO: 10.5 K/UL (ref 5–10)

## 2019-06-18 NOTE — TELEPHONE ENCOUNTER
----- Message from Dalton Navas NP sent at 6/18/2019 12:52 PM CDT -----  What is her dose of xultophy? And is she titrating it up based on her home glucose readings?  Synthroid to 50mcg daily. She has surgery scheduled on the same day as her f/u appt here, we may need to reschedule her

## 2019-06-18 NOTE — TELEPHONE ENCOUNTER
Call received Sunday regarding critical Hgb 6.5. Spoke with patient directly and sent to [facility] ER for transfusion.

## 2019-06-19 ENCOUNTER — TELEPHONE (OUTPATIENT)
Dept: OBSTETRICS AND GYNECOLOGY | Facility: CLINIC | Age: 44
End: 2019-06-19

## 2019-06-19 RX ORDER — LEVOTHYROXINE SODIUM 50 UG/1
50 CAPSULE ORAL DAILY
Qty: 30 CAPSULE | Refills: 2 | Status: SHIPPED | OUTPATIENT
Start: 2019-06-19 | End: 2019-12-19 | Stop reason: SDUPTHER

## 2019-06-19 NOTE — TELEPHONE ENCOUNTER
----- Message from Dalton Navas NP sent at 6/19/2019  7:46 AM CDT -----  Improved, cont iron supplement.

## 2019-06-19 NOTE — TELEPHONE ENCOUNTER
I let the patient know that she will need to go to her employer and get the FMLA paperwork from them. She will need a preop with [facility] and we will get that scheduled and call her back in the morning.

## 2019-06-19 NOTE — TELEPHONE ENCOUNTER
----- Message from Batool Adames sent at 6/19/2019  1:31 PM CDT -----  Type: Needs Medical Advice    Who Called:  Patient  Best Call Back Number: [phone]  Additional Information: Patient is calling to find out where she is going to get the paper work for her surgery from to give to her boss. Please call to advise.

## 2019-06-20 ENCOUNTER — TELEPHONE (OUTPATIENT)
Dept: OBSTETRICS AND GYNECOLOGY | Facility: CLINIC | Age: 44
End: 2019-06-20

## 2019-06-20 NOTE — TELEPHONE ENCOUNTER
----- Message from Nannette Lara sent at 6/20/2019  1:42 PM CDT -----  Type: Needs Medical Advice    Who Called:  patient  Symptoms (please be specific):  Scheduled for surgery  How long has patient had these symptoms:  alice  Pharmacy name and phone #:  alice  Best Call Back Number: [phone]  Additional Information: has questions concerning her surgery on 07 03 19/please call

## 2019-06-20 NOTE — TELEPHONE ENCOUNTER
Called pt/ spoke directly with her regarding scheduling pre-op appt, pre-op scheudled for 06/26/19 @ 1:05 pm; surgery scheduled for 07/03/19 @ 12pm. Pt verbalized understanding.

## 2019-06-26 DIAGNOSIS — E11.9 TYPE 2 DIABETES MELLITUS WITHOUT COMPLICATION, WITH LONG-TERM CURRENT USE OF INSULIN: ICD-10-CM

## 2019-06-26 DIAGNOSIS — Z79.4 TYPE 2 DIABETES MELLITUS WITHOUT COMPLICATION, WITH LONG-TERM CURRENT USE OF INSULIN: ICD-10-CM

## 2019-06-26 LAB
ALDOST SERPL-MCNC: 3 NG/DL
BASOPHILS # BLD AUTO: 72 CELLS/UL (ref 0–200)
BASOPHILS NFR BLD AUTO: 0.9 %
CORTIS SERPL-MCNC: 10.2 MCG/DL
EOSINOPHIL # BLD AUTO: 160 CELLS/UL (ref 15–500)
EOSINOPHIL NFR BLD AUTO: 2 %
ERYTHROCYTE [DISTWIDTH] IN BLOOD BY AUTOMATED COUNT: 19 % (ref 11–15)
FERRITIN SERPL-MCNC: 2 NG/ML (ref 10–232)
HBA1C MFR BLD: 8.7 % OF TOTAL HGB
HCT VFR BLD AUTO: 26.6 % (ref 35–45)
HGB BLD-MCNC: 6.5 G/DL (ref 11.7–15.5)
IRON SATN MFR SERPL: 4 % (CALC) (ref 11–50)
IRON SERPL-MCNC: 15 MCG/DL (ref 40–190)
LYMPHOCYTES # BLD AUTO: 2168 CELLS/UL (ref 850–3900)
LYMPHOCYTES NFR BLD AUTO: 27.1 %
MCH RBC QN AUTO: 16 PG (ref 27–33)
MCHC RBC AUTO-ENTMCNC: 24.4 G/DL (ref 32–36)
MCV RBC AUTO: 65.7 FL (ref 80–100)
MONOCYTES # BLD AUTO: 416 CELLS/UL (ref 200–950)
MONOCYTES NFR BLD AUTO: 5.2 %
NEUTROPHILS # BLD AUTO: 5184 CELLS/UL (ref 1500–7800)
NEUTROPHILS NFR BLD AUTO: 64.8 %
PLATELET # BLD AUTO: 382 THOUSAND/UL (ref 140–400)
PMV BLD REES-ECKER: ABNORMAL FL
RBC # BLD AUTO: 4.05 MILLION/UL (ref 3.8–5.1)
SERVICE CMNT-IMP: ABNORMAL
TIBC SERPL-MCNC: 365 MCG/DL (CALC) (ref 250–450)
TSH SERPL-ACNC: 14.97 MIU/L
U DHEA SERPL-MCNC: NORMAL MG/L
WBC # BLD AUTO: 8 THOUSAND/UL (ref 3.8–10.8)

## 2019-06-26 RX ORDER — (INSULIN DEGLUDEC AND LIRAGLUTIDE) 100; 3.6 [IU]/ML; MG/ML
INJECTION, SOLUTION SUBCUTANEOUS
Qty: 9 SYRINGE | Refills: 3 | Status: SHIPPED | OUTPATIENT
Start: 2019-06-26 | End: 2019-12-19

## 2019-07-05 ENCOUNTER — TELEPHONE (OUTPATIENT)
Dept: OBSTETRICS AND GYNECOLOGY | Facility: CLINIC | Age: 44
End: 2019-07-05

## 2019-07-05 NOTE — TELEPHONE ENCOUNTER
----- Message from Janette Pittman sent at 7/5/2019  9:13 AM CDT -----  Contact: nilesh pt  Type: Needs Medical Advice    Who Called:  Nilesh  Best Call Back Number: [phone]  Additional Information: Pls call pt regarding how long does she have to wait to take a shower/activities. She just had surgery 07/03/19

## 2019-07-05 NOTE — TELEPHONE ENCOUNTER
----- Message from Zoey Marin sent at 7/5/2019  2:00 PM CDT -----  Contact: self   Patient want to know if doctor can write a rx for hospital bed please call back at [phone] or [phone]

## 2019-07-05 NOTE — TELEPHONE ENCOUNTER
Spoke with patient notified OK to shower just not to scrub on the surgical sites and to have someone there to help her in and out of the shower. She verbalized understanding. I also notified to limit her activity although she had laparoscopic surgery she does have stitches at the vaginal cuff and she needs to rest to let it heal. She verbalized understanding no further questions.

## 2019-07-11 ENCOUNTER — OFFICE VISIT (OUTPATIENT)
Dept: FAMILY MEDICINE | Facility: CLINIC | Age: 44
End: 2019-07-11
Payer: COMMERCIAL

## 2019-07-11 VITALS
SYSTOLIC BLOOD PRESSURE: 132 MMHG | WEIGHT: 248 LBS | OXYGEN SATURATION: 98 % | TEMPERATURE: 98 F | HEART RATE: 91 BPM | HEIGHT: 65 IN | DIASTOLIC BLOOD PRESSURE: 84 MMHG | BODY MASS INDEX: 41.32 KG/M2

## 2019-07-11 DIAGNOSIS — Z09 HOSPITAL DISCHARGE FOLLOW-UP: Primary | ICD-10-CM

## 2019-07-11 DIAGNOSIS — D64.9 ANEMIA, UNSPECIFIED TYPE: ICD-10-CM

## 2019-07-11 DIAGNOSIS — Z79.4 TYPE 2 DIABETES MELLITUS WITHOUT COMPLICATION, WITH LONG-TERM CURRENT USE OF INSULIN: ICD-10-CM

## 2019-07-11 DIAGNOSIS — E11.9 TYPE 2 DIABETES MELLITUS WITHOUT COMPLICATION, WITH LONG-TERM CURRENT USE OF INSULIN: ICD-10-CM

## 2019-07-11 DIAGNOSIS — E03.9 ACQUIRED HYPOTHYROIDISM: ICD-10-CM

## 2019-07-11 DIAGNOSIS — E78.5 DYSLIPIDEMIA: ICD-10-CM

## 2019-07-11 PROCEDURE — 3008F PR BODY MASS INDEX (BMI) DOCUMENTED: ICD-10-PCS | Mod: ,,, | Performed by: NURSE PRACTITIONER

## 2019-07-11 PROCEDURE — 3079F PR MOST RECENT DIASTOLIC BLOOD PRESSURE 80-89 MM HG: ICD-10-PCS | Mod: ,,, | Performed by: NURSE PRACTITIONER

## 2019-07-11 PROCEDURE — 3075F PR MOST RECENT SYSTOLIC BLOOD PRESS GE 130-139MM HG: ICD-10-PCS | Mod: ,,, | Performed by: NURSE PRACTITIONER

## 2019-07-11 PROCEDURE — 3075F SYST BP GE 130 - 139MM HG: CPT | Mod: ,,, | Performed by: NURSE PRACTITIONER

## 2019-07-11 PROCEDURE — 3045F PR MOST RECENT HEMOGLOBIN A1C LEVEL 7.0-9.0%: CPT | Mod: ,,, | Performed by: NURSE PRACTITIONER

## 2019-07-11 PROCEDURE — 99214 PR OFFICE/OUTPT VISIT, EST, LEVL IV, 30-39 MIN: ICD-10-PCS | Mod: ,,, | Performed by: NURSE PRACTITIONER

## 2019-07-11 PROCEDURE — 99214 OFFICE O/P EST MOD 30 MIN: CPT | Mod: ,,, | Performed by: NURSE PRACTITIONER

## 2019-07-11 PROCEDURE — 3079F DIAST BP 80-89 MM HG: CPT | Mod: ,,, | Performed by: NURSE PRACTITIONER

## 2019-07-11 PROCEDURE — 3008F BODY MASS INDEX DOCD: CPT | Mod: ,,, | Performed by: NURSE PRACTITIONER

## 2019-07-11 PROCEDURE — 3045F PR MOST RECENT HEMOGLOBIN A1C LEVEL 7.0-9.0%: ICD-10-PCS | Mod: ,,, | Performed by: NURSE PRACTITIONER

## 2019-07-11 NOTE — PROGRESS NOTES
SUBJECTIVE:      Patient ID: Haydee Allne is a 44 y.o. female.    Chief Complaint: Diabetes (6w f/u); Gastroesophageal Reflux; and Hospital Follow Up       is here today as a hospital f/u for hysterectomy. Has a f/u with Dr Hale on Monday. Feeling well overall, no abdominal pain, no fever.  Having daily BMs.   She is also following up on DM and hypothyroidism. Her fasting blood glucose at home is 130's. She is taking 42 units of xultophy and tolerating well. She was confused on her dose of synthroid. Was supposed to be taking 350mcg but has only been taking 50mcg. Will review with patient.     Diabetes   She presents for her follow-up diabetic visit. She has type 2 diabetes mellitus. Her disease course has been improving. Pertinent negatives for hypoglycemia include no confusion, dizziness, headaches, nervousness/anxiousness, pallor or speech difficulty. Pertinent negatives for diabetes include no chest pain, no weakness and no weight loss. There are no hypoglycemic complications. Symptoms are stable. Risk factors for coronary artery disease include obesity, diabetes mellitus and hypertension. Current diabetic treatment includes insulin injections and oral agent (monotherapy). She is compliant with treatment some of the time. Her weight is decreasing steadily. She is following a generally unhealthy diet. When asked about meal planning, she reported none. She participates in exercise intermittently. Her breakfast blood glucose is taken between 7-8 am. Her breakfast blood glucose range is generally 140-180 mg/dl. An ACE inhibitor/angiotensin II receptor blocker is being taken. She does not see a podiatrist.Eye exam is current.   Thyroid Problem   Presents for follow-up visit. Patient reports no anxiety, cold intolerance, constipation, depressed mood, diaphoresis, heat intolerance, palpitations, weight gain or weight loss. The symptoms have been stable.   Anemia   Presents for follow-up visit.  There has been no abdominal pain, bruising/bleeding easily, confusion, light-headedness, pallor, palpitations or weight loss. Procedure history includes colonoscopy and EGD.       Past Surgical History:   Procedure Laterality Date     SECTION      HYSTERECTOMY, TOTAL, LAPAROSCOPIC N/A 7/3/2019    Per[facility] at [facility]    OPEN REDUCTION AND INTERNAL FIXATION (ORIF) OF INJURY OF ANKLE Left     SALPINGO-OOPHORECTOMY, LAPAROSCOPIC Bilateral 7/3/2019    [facility] at [facility]    THYROIDECTOMY, PARTIAL      TUBAL LIGATION       Family History   Problem Relation Age of Onset    Cancer Mother     Hypertension Mother     Diabetes Mother     Arthritis Mother     Heart disease Mother     Kidney disease Mother     Cancer Father       Social History     Socioeconomic History    Marital status: Single     Spouse name: Not on file    Number of children: Not on file    Years of education: Not on file    Highest education level: Not on file   Occupational History    Not on file   Social Needs    Financial resource strain: Not on file    Food insecurity:     Worry: Not on file     Inability: Not on file    Transportation needs:     Medical: Not on file     Non-medical: Not on file   Tobacco Use    Smoking status: Never Smoker    Smokeless tobacco: Never Used   Substance and Sexual Activity    Alcohol use: No    Drug use: No    Sexual activity: Never   Lifestyle    Physical activity:     Days per week: Not on file     Minutes per session: Not on file    Stress: Not on file   Relationships    Social connections:     Talks on phone: Not on file     Gets together: Not on file     Attends [employer] service: Not on file     Active member of club or organization: Not on file     Attends meetings of clubs or organizations: Not on file     Relationship status: Not on file   Other Topics Concern    Not on file   Social History Narrative    Not on file  "    Current Outpatient Medications   Medication Sig Dispense Refill    atorvastatin (LIPITOR) 10 MG tablet Take 1 tablet (10 mg total) by mouth once daily. (Patient taking differently: Take 10 mg by mouth once daily. TAKE AM OF SURGERY WITH A SIP OF WATER) 90 tablet 0    blood sugar diagnostic (TRUE METRIX GLUCOSE TEST STRIP MISC) True Metrix Glucose Test Strip   Use twice daily as directed      ferrous sulfate (FEOSOL) 325 mg (65 mg iron) Tab tablet Take 1 tablet (325 mg total) by mouth 2 (two) times daily. (Patient taking differently: Take 325 mg by mouth 2 (two) times daily. HOLD AM OF SURGERY) 60 tablet 3    ibuprofen (ADVIL,MOTRIN) 600 MG tablet Take 1 tablet (600 mg total) by mouth every 6 (six) hours as needed for Pain. 20 tablet 2    levothyroxine (TIROSINT) 50 mcg Cap Take 50 mcg by mouth once daily. (Patient taking differently: Take 50 mcg by mouth once daily. TAKE AM OF SURGERY WITH A SIP OF WATER) 30 capsule 2    lisinopril (PRINIVIL,ZESTRIL) 20 MG tablet Take 1 tablet (20 mg total) by mouth once daily. (Patient taking differently: Take 20 mg by mouth once daily. HOLD AM OF SURGERY) 30 tablet 1    metFORMIN (GLUCOPHAGE) 1000 MG tablet TAKE 1 TABLET BY MOUTH TWICE DAILY WITH MEALS (Patient taking differently: TAKE 1 TABLET BY MOUTH TWICE DAILY WITH MEALS  DO NOT TAKE NIGHT BEFORE OR DAY OF SURGERY) 180 tablet 0    oxyCODONE-acetaminophen (PERCOCET) 5-325 mg per tablet Take 1 tablet by mouth every 4 (four) hours as needed for Pain. 20 tablet 0    pen needle, diabetic (BD ULTRA-FINE SHORT PEN NEEDLE) 31 gauge x 5/16" Ndle BD Ultra-Fine Short Pen Needle 31 gauge x 5/16"   Use daily with toujeo      ranitidine (ZANTAC) 150 MG tablet Take 1 tablet (150 mg total) by mouth 2 (two) times daily. (Patient taking differently: Take 150 mg by mouth 2 (two) times daily. TAKE AM OF SURGERY WITH A SIP OF WATER) 60 tablet 1    XULTOPHY 100/3.6 100 unit-3.6 mg /mL (3 mL) InPn INJECT 16 UNITS INTO THE SKIN ONCE " DAILY (Patient taking differently: INJECT 42 UNITS INTO THE SKIN ONCE DAILY  TAKE 21 UNITS AM OF SURGERY) 9 Syringe 3     No current facility-administered medications for this visit.      Facility-Administered Medications Ordered in Other Visits   Medication Dose Route Frequency Provider Last Rate Last Dose    lidocaine (PF) 10 mg/ml (1%) injection 2 mg  0.2 mL Intradermal Once Brenda Hauser MD         Review of patient's allergies indicates:  No Known Allergies   Past Medical History:   Diagnosis Date    Abdominal pain     RIGHT MID ABD    Anemia     Anxiety     Arthritis     Diabetes mellitus     Diabetes mellitus, type 2     Encounter for blood transfusion     2    Hypertension     Obesity     Ovarian cyst     right    Sinus congestion     Thyroid disease      Past Surgical History:   Procedure Laterality Date     SECTION      HYSTERECTOMY, TOTAL, LAPAROSCOPIC N/A 7/3/2019    [facility] at [facility] OR    OPEN REDUCTION AND INTERNAL FIXATION (ORIF) OF INJURY OF ANKLE Left     SALPINGO-OOPHORECTOMY, LAPAROSCOPIC Bilateral 7/3/2019    Performed by [facility] at [facility] OR    THYROIDECTOMY, PARTIAL      TUBAL LIGATION         Review of Systems   Constitutional: Negative for appetite change, chills, diaphoresis, unexpected weight change, weight gain and weight loss.   HENT: Negative for congestion, ear discharge, hearing loss, postnasal drip, trouble swallowing and voice change.    Eyes: Negative for photophobia and pain.   Respiratory: Negative for chest tightness and stridor.    Cardiovascular: Negative for chest pain and palpitations.   Gastrointestinal: Negative for abdominal pain, blood in stool, constipation and vomiting.   Endocrine: Negative for cold intolerance and heat intolerance.   Genitourinary: Negative for difficulty urinating and flank pain.   Musculoskeletal: Negative for joint swelling and neck stiffness.   Skin: Negative for pallor.  "  Neurological: Negative for dizziness, speech difficulty, weakness, light-headedness and headaches.   Hematological: Does not bruise/bleed easily.   Psychiatric/Behavioral: Negative for confusion and dysphoric mood. The patient is not nervous/anxious.       OBJECTIVE:      Vitals:    07/11/19 1027 07/11/19 1059   BP: (!) 144/90 132/84   Pulse: 91    Temp: 98.4 °F (36.9 °C)    SpO2: 98%    Weight: 112.5 kg (248 lb)    Height: 5' 5" (1.651 m)      Physical Exam   Constitutional: She is oriented to person, place, and time. She appears well-developed and well-nourished.   HENT:   Head: Atraumatic.   Mouth/Throat: Oropharynx is clear and moist and mucous membranes are normal.   Eyes: Conjunctivae are normal.   Neck: Neck supple. Carotid bruit is not present. No thyromegaly present.   Cardiovascular: Normal rate, regular rhythm, normal heart sounds and intact distal pulses.   Pulmonary/Chest: Effort normal and breath sounds normal. She has no wheezes. She has no rhonchi. She has no rales.   Abdominal: Soft. Bowel sounds are normal. She exhibits no distension.   Abdominal incisions intact. No oozing noted. Minimal tenderness around incision sites. Left incision with 2 inch hematoma noted. Patient states it is smaller then when she was discharged from the hospital.    Musculoskeletal: Normal range of motion. She exhibits no edema.   Lymphadenopathy:     She has no cervical adenopathy.   Neurological: She is alert and oriented to person, place, and time.   Skin: Skin is warm and dry.   Psychiatric: She has a normal mood and affect. Her speech is normal and behavior is normal.   Nursing note and vitals reviewed.     Assessment:       1. Hospital discharge follow-up    2. Type 2 diabetes mellitus without complication, with long-term current use of insulin    3. Acquired hypothyroidism    4. Anemia, unspecified type    5. Dyslipidemia        Plan:       Hospital discharge follow-up  Hospital records reviewed  Medications " reconciled    Type 2 diabetes mellitus without complication, with long-term current use of insulin  -     Comprehensive metabolic panel; Future; Expected date: 07/11/2019  -     Hemoglobin A1c; Future; Expected date: 07/11/2019    Acquired hypothyroidism  -     TSH; Future; Expected date: 07/11/2019  -     T4, free; Future; Expected date: 07/11/2019  -     T3, free; Future; Expected date: 07/11/2019  Patient will double check medication at home and call with doses. Discussed that she should be taking her 300mcg tab with a 25mcg tab then we will repeat labs in 4-6 weeks.     Anemia, unspecified type  -     CBC auto differential; Future; Expected date: 07/11/2019    Dyslipidemia  -     Lipid panel; Future; Expected date: 07/11/2019        Follow up in about 3 months (around 10/11/2019) for DM .      7/11/2019 DIANA Hernandes, FNP

## 2019-07-11 NOTE — PATIENT INSTRUCTIONS
4 Steps for Eating Healthier  Changing the way you eat can improve your health. It can lower your cholesterol and blood pressure, and help you stay at a healthy weight. Your diet doesnt have to be bland and boring to be healthy. Just watch your calories and follow these steps:    1. Eat fewer unhealthy fats  · Choose more fish and lean meats instead of fatty cuts of meat.  · Skip butter and lard, and use less margarine.  · Pass on foods that have palm, coconut, or hydrogenated oils.  · Eat fewer high-fat dairy foods like cheese, ice cream, and whole milk.  · Get a heart-healthy cookbook and try some low-fat recipes.  2. Go light on salt  · Keep the saltshaker off the table.  · Limit high-salt ingredients, such as soy sauce, bouillon, and garlic salt.  · Instead of adding salt when cooking, season your food with herbs and flavorings. Try lemon, garlic, and onion.  · Limit convenience foods, such as boxed or canned foods and restaurant food.  · Read food labels and choose lower-sodium options.  3. Limit sugar  · Pause before you add sugars to pancakes, cereal, coffee, or tea. This includes white and brown table sugar, syrup, honey, and molasses. Cut your usual amount by half.  · Use non-sugar sweeteners. Stevia, aspartame, and sucralose can satisfy a sweet tooth without adding calories.  · Swap out sugar-filled soda and other drinks. Buy sugar-free or low-calorie beverages. Remember water is always the best choice.  · Read labels and choose foods with less added sugar. Keep in mind that dairy foods and foods with fruit will have some natural sugar.  · Cut the sugar in recipes by 1/3 to 1/2. Boost the flavor with extracts like almond, vanilla, or orange. Or add spices such as cinnamon or nutmeg.  4. Eat more fiber  · Eat fresh fruits and vegetables every day.  · Boost your diet with whole grains. Go for oats, whole-grain rice, and bran.  · Add beans and lentils to your meals.  · Drink more water to match your fiber  increase. This is to help prevent constipation.  Date Last Reviewed: 5/11/2015  © 5215-7994 [employer]. [address] [place] [address], [place] [address]. All rights reserved. This information is not intended as a substitute for professional medical care. Always follow your healthcare professional's instructions.

## 2019-07-15 ENCOUNTER — OFFICE VISIT (OUTPATIENT)
Dept: OBSTETRICS AND GYNECOLOGY | Facility: CLINIC | Age: 44
End: 2019-07-15
Payer: COMMERCIAL

## 2019-07-15 VITALS
WEIGHT: 246.94 LBS | BODY MASS INDEX: 41.14 KG/M2 | RESPIRATION RATE: 18 BRPM | HEIGHT: 65 IN | DIASTOLIC BLOOD PRESSURE: 90 MMHG | SYSTOLIC BLOOD PRESSURE: 124 MMHG

## 2019-07-15 DIAGNOSIS — Z90.710 S/P LAPAROSCOPIC HYSTERECTOMY: Primary | ICD-10-CM

## 2019-07-15 PROCEDURE — 99024 PR POST-OP FOLLOW-UP VISIT: ICD-10-PCS | Mod: S$GLB,,, | Performed by: OBSTETRICS & GYNECOLOGY

## 2019-07-15 PROCEDURE — 99999 PR PBB SHADOW E&M-EST. PATIENT-LVL III: CPT | Mod: PBBFAC,,, | Performed by: OBSTETRICS & GYNECOLOGY

## 2019-07-15 PROCEDURE — 99024 POSTOP FOLLOW-UP VISIT: CPT | Mod: S$GLB,,, | Performed by: OBSTETRICS & GYNECOLOGY

## 2019-07-15 PROCEDURE — 99999 PR PBB SHADOW E&M-EST. PATIENT-LVL III: ICD-10-PCS | Mod: PBBFAC,,, | Performed by: OBSTETRICS & GYNECOLOGY

## 2019-07-15 NOTE — PROGRESS NOTES
Subjective:       Patient ID: Haydee Allen is a 44 y.o. female.    Chief Complaint:  Post-op Evaluation (abd pain/ swelling LLQ)      History of Present Illness  HPI  Postoperative Follow-up  Patient presents to the clinic 2 weeks status post total laparoscopic hysterectomy for abnormal uterine bleeding, adnexal mass and fibroids. Eating a regular diet without difficulty. Bowel movements are normal. Pain is controlled with current analgesics. Medications being used: prescription NSAID's including ibuprofen.Patient reports pain at site of hematoma at left abdominal incision. Patient reports slow resolution.      GYN & OB History  Patient's last menstrual period was 2019.   Date of Last Pap: 2019    OB History    Para Term  AB Living   4 4 4         SAB TAB Ectopic Multiple Live Births                  # Outcome Date GA Lbr Fidencio/2nd Weight Sex Delivery Anes PTL Lv   4 Term            3 Term            2 Term            1 Term                Review of Systems  Review of Systems   Constitutional: Negative for activity change, appetite change, chills, diaphoresis, fatigue, fever and unexpected weight change.   HENT: Negative for nasal congestion, mouth sores and tinnitus.    Eyes: Negative for discharge and visual disturbance.   Respiratory: Negative for cough, shortness of breath and wheezing.    Cardiovascular: Negative for chest pain, palpitations and leg swelling.   Gastrointestinal: Negative for abdominal pain, bloating, blood in stool, constipation, diarrhea, nausea, vomiting, reflux and fecal incontinence.   Endocrine: Positive for diabetes. Negative for hair loss, hot flashes, hyperthyroidism and hypothyroidism.   Genitourinary: Negative for bladder incontinence, decreased libido, dysmenorrhea, dyspareunia, dysuria, flank pain, frequency, genital sores, hematuria, hot flashes, menorrhagia, menstrual problem, pelvic pain, urgency, vaginal bleeding, vaginal discharge, vaginal pain,  urinary incontinence, postcoital bleeding, postmenopausal bleeding, vaginal dryness and vaginal odor.   Musculoskeletal: Negative for arthralgias, back pain, joint swelling, leg pain and myalgias.   Integumentary:  Negative for rash, acne, hair changes, mole/lesion, breast mass, nipple discharge, breast skin changes and breast tenderness.   Neurological: Negative for vertigo, seizures, syncope, numbness and headaches.   Hematological: Negative for adenopathy. Does not bruise/bleed easily.   Psychiatric/Behavioral: Negative for depression and sleep disturbance. The patient is not nervous/anxious.    Breast: Negative for asymmetry, breast self exam, lump, mass, mastodynia, nipple discharge, skin changes and tenderness          Objective:    Physical Exam:   Constitutional: She is oriented to person, place, and time. She appears well-developed and well-nourished. No distress.    HENT:   Head: Normocephalic.   Nose: No epistaxis.    Eyes: Pupils are equal, round, and reactive to light.    Neck: Normal range of motion.    Cardiovascular: Normal rate.     Pulmonary/Chest: Effort normal.        Abdominal: Soft. She exhibits mass and abdominal incision. There is tenderness.   Incision sites are healing well. All sutures are removed today. There is a resolving hematoma at left side of abdomen.     Genitourinary:   Genitourinary Comments: deferred           Musculoskeletal: Normal range of motion and moves all extremeties.       Neurological: She is alert and oriented to person, place, and time.    Skin: Skin is warm and dry. She is not diaphoretic.    Psychiatric: She has a normal mood and affect. Her behavior is normal. Judgment and thought content normal.          Assessment:        1. S/P laparoscopic hysterectomy       2.     Right ovarian serous cystadenoma         Plan:      Follow up in 4 weeks  Percocet 5/325 x 10 tabs   Wound care instruction given

## 2019-07-17 ENCOUNTER — OFFICE VISIT (OUTPATIENT)
Dept: FAMILY MEDICINE | Facility: CLINIC | Age: 44
End: 2019-07-17
Payer: COMMERCIAL

## 2019-07-17 VITALS
DIASTOLIC BLOOD PRESSURE: 92 MMHG | WEIGHT: 243.19 LBS | SYSTOLIC BLOOD PRESSURE: 128 MMHG | HEART RATE: 92 BPM | BODY MASS INDEX: 40.52 KG/M2 | RESPIRATION RATE: 17 BRPM | OXYGEN SATURATION: 97 % | TEMPERATURE: 98 F | HEIGHT: 65 IN

## 2019-07-17 DIAGNOSIS — J20.9 ACUTE BRONCHITIS, UNSPECIFIED ORGANISM: Primary | ICD-10-CM

## 2019-07-17 DIAGNOSIS — H65.03 BILATERAL ACUTE SEROUS OTITIS MEDIA, RECURRENCE NOT SPECIFIED: ICD-10-CM

## 2019-07-17 PROCEDURE — 96372 PR INJECTION,THERAP/PROPH/DIAG2ST, IM OR SUBCUT: ICD-10-PCS | Mod: ,,, | Performed by: NURSE PRACTITIONER

## 2019-07-17 PROCEDURE — 3074F PR MOST RECENT SYSTOLIC BLOOD PRESSURE < 130 MM HG: ICD-10-PCS | Mod: ,,, | Performed by: NURSE PRACTITIONER

## 2019-07-17 PROCEDURE — 96372 THER/PROPH/DIAG INJ SC/IM: CPT | Mod: ,,, | Performed by: NURSE PRACTITIONER

## 2019-07-17 PROCEDURE — 3074F SYST BP LT 130 MM HG: CPT | Mod: ,,, | Performed by: NURSE PRACTITIONER

## 2019-07-17 PROCEDURE — 3080F DIAST BP >= 90 MM HG: CPT | Mod: ,,, | Performed by: NURSE PRACTITIONER

## 2019-07-17 PROCEDURE — 99213 PR OFFICE/OUTPT VISIT, EST, LEVL III, 20-29 MIN: ICD-10-PCS | Mod: 25,,, | Performed by: NURSE PRACTITIONER

## 2019-07-17 PROCEDURE — 3080F PR MOST RECENT DIASTOLIC BLOOD PRESSURE >= 90 MM HG: ICD-10-PCS | Mod: ,,, | Performed by: NURSE PRACTITIONER

## 2019-07-17 PROCEDURE — 3008F PR BODY MASS INDEX (BMI) DOCUMENTED: ICD-10-PCS | Mod: ,,, | Performed by: NURSE PRACTITIONER

## 2019-07-17 PROCEDURE — 99213 OFFICE O/P EST LOW 20 MIN: CPT | Mod: 25,,, | Performed by: NURSE PRACTITIONER

## 2019-07-17 PROCEDURE — 3008F BODY MASS INDEX DOCD: CPT | Mod: ,,, | Performed by: NURSE PRACTITIONER

## 2019-07-17 RX ORDER — AZITHROMYCIN 250 MG/1
TABLET, FILM COATED ORAL
Qty: 6 TABLET | Refills: 0 | Status: SHIPPED | OUTPATIENT
Start: 2019-07-17 | End: 2019-08-13 | Stop reason: ALTCHOICE

## 2019-07-17 RX ORDER — BENZONATATE 100 MG/1
100 CAPSULE ORAL 3 TIMES DAILY PRN
Qty: 30 CAPSULE | Refills: 0 | Status: SHIPPED | OUTPATIENT
Start: 2019-07-17 | End: 2019-08-13 | Stop reason: ALTCHOICE

## 2019-07-17 RX ORDER — FLUTICASONE PROPIONATE 50 MCG
1 SPRAY, SUSPENSION (ML) NASAL DAILY
Qty: 1 BOTTLE | Refills: 0 | Status: SHIPPED | OUTPATIENT
Start: 2019-07-17 | End: 2019-12-19

## 2019-07-17 NOTE — PROGRESS NOTES
SUBJECTIVE:      Patient ID: Haydee Allen is a 44 y.o. female.    Chief Complaint: Cough and Chest Congestion    Established patient of MUNDO Navas NP here for sick visit today. She has been feeling bad since 7/3/19, reports a sore throat and feeling run down but denied any fever or chills. She had a GREG on 7/3/19 with Dr. Hale, reports her surgery went very well and her post op visit this week went fine. She has been c/o increasing cough and chest congestion- now productive with white sputum. She denies any smoking history, asthma or COPD.  She does report a hx of HTN but she did not take her blood pressure meds today yet.     Cough   This is a new problem. The current episode started 1 to 4 weeks ago (7/3/19p- started feeling bad). The problem has been gradually worsening. The problem occurs every few minutes. The cough is productive of sputum (white). Associated symptoms include ear pain, a sore throat and shortness of breath. Pertinent negatives include no chest pain, chills, ear congestion, fever, headaches, hemoptysis, myalgias, nasal congestion, postnasal drip, rash, rhinorrhea or wheezing. The symptoms are aggravated by lying down. She has tried rest for the symptoms. The treatment provided no relief. There is no history of asthma or COPD.       Family History   Problem Relation Age of Onset    Cancer Mother     Hypertension Mother     Diabetes Mother     Arthritis Mother     Heart disease Mother     Kidney disease Mother     Cancer Father       Social History     Socioeconomic History    Marital status: Single     Spouse name: Not on file    Number of children: Not on file    Years of education: Not on file    Highest education level: Not on file   Occupational History    Not on file   Social Needs    Financial resource strain: Not on file    Food insecurity:     Worry: Not on file     Inability: Not on file    Transportation needs:     Medical: Not on file     Non-medical: Not on  file   Tobacco Use    Smoking status: Never Smoker    Smokeless tobacco: Never Used   Substance and Sexual Activity    Alcohol use: No    Drug use: No    Sexual activity: Never   Lifestyle    Physical activity:     Days per week: Not on file     Minutes per session: Not on file    Stress: Not at all   Relationships    Social connections:     Talks on phone: Not on file     Gets together: Not on file     Attends Taoism service: Not on file     Active member of club or organization: Not on file     Attends meetings of clubs or organizations: Not on file     Relationship status: Not on file   Other Topics Concern    Not on file   Social History Narrative    Not on file     Current Outpatient Medications   Medication Sig Dispense Refill    atorvastatin (LIPITOR) 10 MG tablet Take 1 tablet (10 mg total) by mouth once daily. (Patient taking differently: Take 10 mg by mouth once daily. TAKE AM OF SURGERY WITH A SIP OF WATER) 90 tablet 0    blood sugar diagnostic (TRUE METRIX GLUCOSE TEST STRIP MISC) True Metrix Glucose Test Strip   Use twice daily as directed      ferrous sulfate (FEOSOL) 325 mg (65 mg iron) Tab tablet Take 1 tablet (325 mg total) by mouth 2 (two) times daily. (Patient taking differently: Take 325 mg by mouth 2 (two) times daily. HOLD AM OF SURGERY) 60 tablet 3    ibuprofen (ADVIL,MOTRIN) 600 MG tablet Take 1 tablet (600 mg total) by mouth every 6 (six) hours as needed for Pain. 20 tablet 2    levothyroxine (TIROSINT) 50 mcg Cap Take 50 mcg by mouth once daily. (Patient taking differently: Take 50 mcg by mouth once daily. TAKE AM OF SURGERY WITH A SIP OF WATER) 30 capsule 2    lisinopril (PRINIVIL,ZESTRIL) 20 MG tablet Take 1 tablet (20 mg total) by mouth once daily. (Patient taking differently: Take 20 mg by mouth once daily. HOLD AM OF SURGERY) 30 tablet 1    metFORMIN (GLUCOPHAGE) 1000 MG tablet TAKE 1 TABLET BY MOUTH TWICE DAILY WITH MEALS (Patient taking differently: TAKE 1 TABLET  "BY MOUTH TWICE DAILY WITH MEALS  DO NOT TAKE NIGHT BEFORE OR DAY OF SURGERY) 180 tablet 0    oxyCODONE-acetaminophen (PERCOCET) 5-325 mg per tablet Take 1 tablet by mouth every 4 (four) hours as needed for Pain. 20 tablet 0    pen needle, diabetic (BD ULTRA-FINE SHORT PEN NEEDLE) 31 gauge x 5/16" Ndle BD Ultra-Fine Short Pen Needle 31 gauge x 5/16"   Use daily with toujeo      ranitidine (ZANTAC) 150 MG tablet Take 1 tablet (150 mg total) by mouth 2 (two) times daily. (Patient taking differently: Take 150 mg by mouth 2 (two) times daily. TAKE AM OF SURGERY WITH A SIP OF WATER) 60 tablet 1    XULTOPHY 100/3.6 100 unit-3.6 mg /mL (3 mL) InPn INJECT 16 UNITS INTO THE SKIN ONCE DAILY (Patient taking differently: INJECT 42 UNITS INTO THE SKIN ONCE DAILY  TAKE 21 UNITS AM OF SURGERY) 9 Syringe 3    azithromycin (Z-YU) 250 MG tablet Take 2 tablets by mouth on day 1; Take 1 tablet by mouth on days 2-5 6 tablet 0    benzonatate (TESSALON) 100 MG capsule Take 1 capsule (100 mg total) by mouth 3 (three) times daily as needed for Cough. 30 capsule 0    fluticasone propionate (FLONASE) 50 mcg/actuation nasal spray 1 spray (50 mcg total) by Each Nare route once daily. 1 Bottle 0     No current facility-administered medications for this visit.      Facility-Administered Medications Ordered in Other Visits   Medication Dose Route Frequency Provider Last Rate Last Dose    lidocaine (PF) 10 mg/ml (1%) injection 2 mg  0.2 mL Intradermal Once Brenda Hauser MD         Review of patient's allergies indicates:  No Known Allergies   Past Medical History:   Diagnosis Date    Abdominal pain     RIGHT MID ABD    Anemia     Anxiety     Arthritis     Diabetes mellitus     Diabetes mellitus, type 2     Encounter for blood transfusion     2    Hypertension     Obesity     Ovarian cyst     right    Sinus congestion     Thyroid disease      Past Surgical History:   Procedure Laterality Date     SECTION      " "HYSTERECTOMY, TOTAL, LAPAROSCOPIC N/A 7/3/2019    [facility]    OPEN REDUCTION AND INTERNAL FIXATION (ORIF) OF INJURY OF ANKLE Left     SALPINGO-OOPHORECTOMY, LAPAROSCOPIC Bilateral 7/3/2019    [facility] at [facility] OR    THYROIDECTOMY, PARTIAL      TUBAL LIGATION         Review of Systems   Constitutional: Negative for chills and fever.   HENT: Positive for ear pain and sore throat. Negative for congestion, ear discharge, postnasal drip, rhinorrhea, sinus pressure, sinus pain and sneezing.    Eyes: Negative for pain and discharge.   Respiratory: Positive for cough and shortness of breath. Negative for hemoptysis, chest tightness and wheezing.    Cardiovascular: Negative for chest pain and palpitations.   Gastrointestinal: Negative for abdominal pain, nausea and vomiting.   Genitourinary: Negative for dysuria and frequency.   Musculoskeletal: Negative for myalgias.   Skin: Negative for rash.   Neurological: Negative for dizziness, weakness and headaches.   Hematological: Negative for adenopathy.      OBJECTIVE:      Vitals:    07/17/19 0949   BP: (!) 128/92   Pulse: 92   Resp: 17   Temp: 98.1 °F (36.7 °C)   TempSrc: Oral   SpO2: 97%   Weight: 110.3 kg (243 lb 3.2 oz)   Height: 5' 5" (1.651 m)     Physical Exam   Constitutional: She is oriented to person, place, and time. She appears well-developed and well-nourished. No distress.   HENT:   Head: Normocephalic and atraumatic.   Right Ear: Ear canal normal. No drainage or tenderness. No mastoid tenderness. Tympanic membrane is not erythematous and not bulging. A middle ear effusion (serous) is present.   Left Ear: Ear canal normal. No drainage or tenderness. No mastoid tenderness. Tympanic membrane is not erythematous and not bulging. A middle ear effusion (serous) is present.   Nose: Mucosal edema present. No rhinorrhea. No epistaxis. Right sinus exhibits no maxillary sinus tenderness and no frontal sinus " tenderness. Left sinus exhibits no maxillary sinus tenderness and no frontal sinus tenderness.   Mouth/Throat: Uvula is midline, oropharynx is clear and moist and mucous membranes are normal.   Eyes: Pupils are equal, round, and reactive to light. Conjunctivae are normal.   Neck: Neck supple.   Cardiovascular: Normal rate, regular rhythm and normal heart sounds.   Pulmonary/Chest: Effort normal. No accessory muscle usage. No tachypnea. No respiratory distress. She has no decreased breath sounds. She has no wheezes. She has rhonchi (few scattered in upper lobes ). She has no rales.   Abdominal: Soft. Bowel sounds are normal. There is no tenderness.   Lymphadenopathy:     She has no cervical adenopathy.   Neurological: She is alert and oriented to person, place, and time.   Skin: Skin is warm and dry.   Psychiatric: She has a normal mood and affect. Her behavior is normal.   Nursing note and vitals reviewed.     Assessment:       1. Acute bronchitis, unspecified organism    2. Bilateral acute serous otitis media, recurrence not specified        Plan:       Acute bronchitis, unspecified organism  -     benzonatate (TESSALON) 100 MG capsule; Take 1 capsule (100 mg total) by mouth 3 (three) times daily as needed for Cough.  Dispense: 30 capsule; Refill: 0  -     azithromycin (Z-YU) 250 MG tablet; Take 2 tablets by mouth on day 1; Take 1 tablet by mouth on days 2-5  Dispense: 6 tablet; Refill: 0  -     methylPREDNISolone sod suc(PF) injection 40 mg (done in clinic)    Bilateral acute serous otitis media, recurrence not specified  -     fluticasone propionate (FLONASE) 50 mcg/actuation nasal spray; 1 spray (50 mcg total) by Each Nare route once daily.  Dispense: 1 Bottle; Refill: 0        Follow up if symptoms worsen or fail to improve.      7/17/2019 [place] Green, DIANA, FNP

## 2019-07-17 NOTE — PATIENT INSTRUCTIONS
Start Mucinex chest congestion, take 1-2 tablets by mouth daily in am x 5-7 days. Drink plenty of water. Also start a daily probiotic (Culturelle) to help prevent yeast infections while you are on the antibiotic.       What Is Acute Bronchitis?  Acute bronchitis is when the airways in your lungs (bronchial tubes) become red and swollen (inflamed). It is usually caused by a viral infection. But it can also occur because of a bacteria or allergen. Symptoms include a cough that produces yellow or greenish mucus and can last for days or sometimes weeks.  Inside healthy lungs    Air travels in and out of the lungs through the airways. The linings of these airways produce sticky mucus. This mucus traps particles that enter the lungs. Tiny structures called cilia then sweep the particles out of the airways.     Healthy airway: Airways are normally open. Air moves in and out easily.      Healthy cilia: Tiny, hairlike cilia sweep mucus and particles up and out of the airways.   Lungs with bronchitis  Bronchitis often occurs with a cold or the flu virus. The airways become inflamed (red and swollen). There is a deep hacking cough from the extra mucus. Other symptoms may include:  · Wheezing  · Chest discomfort  · Shortness of breath  · Mild fever  A second infection, this time due to bacteria, may then occur. And airways irritated by allergens or smoke are more likely to get infected.        Inflamed airway: Inflammation and extra mucus narrow the airway, causing shortness of breath.      Impaired cilia: Extra mucus impairs cilia, causing congestion and wheezing. Smoking makes the problem worse.   Making a diagnosis  A physical exam, health history, and certain tests help your healthcare provider make the diagnosis.  Health history  Your healthcare provider will ask you about your symptoms.  The exam  Your provider listens to your chest for signs of congestion. He or she may also check your ears, nose, and throat.  Possible  tests  · A sputum test for bacteria. This requires a sample of mucus from your lungs.  · A nasal or throat swab. This tests to see if you have a bacterial infection.  · A chest X-ray. This is done if your healthcare provider thinks you have pneumonia.  · Tests to check for an underlying condition. Other tests may be done to check for things such as allergies, asthma, or COPD (chronic obstructive pulmonary disease). You may need to see a specialist for more lung function testing.  Treating a cough  The main treatment for bronchitis is easing symptoms. Avoiding smoke, allergens, and other things that trigger coughing can often help. If the infection is bacterial, you may be given antibiotics. During the illness, it's important to get plenty of sleep. To ease symptoms:  · Dont smoke. Also avoid secondhand smoke.  · Use a humidifier. Or try breathing in steam from a hot shower. This may help loosen mucus.  · Drink a lot of water and juice. They can soothe the throat and may help thin mucus.  · Sit up or use extra pillows when in bed. This helps to lessen coughing and congestion.  · Ask your provider about using medicine. Ask about using cough medicine, pain and fever medicine, or a decongestant.  Antibiotics  Most cases of bronchitis are caused by cold or flu viruses. They dont need antibiotics to treat them, even if your mucus is thick and green or yellow. Antibiotics dont treat viral illness and antibiotics have not been shown to have any benefit in cases of acute bronchitis. Taking antibiotics when they are not needed increases your risk of getting an infection later that is antibiotic-resistant. Antibiotics can also cause severe cases of diarrhea that require other antibiotics to treat.  It is important that you accept your healthcare provider's opinion to not use antibiotics. Your provider will prescribe antibiotics if the infection is caused by bacteria. If they are prescribed:  · Take all of the medicine. Take  the medicine until it is used up, even if symptoms have improved. If you dont, the bronchitis may come back.  · Take the medicines as directed. For instance, some medicines should be taken with food.  · Ask about side effects. Ask your provider or pharmacist what side effects are common, and what to do about them.  Follow-up care  You should see your provider again in 2 to 3 weeks. By this time, symptoms should have improved. An infection that lasts longer may mean you have a more serious problem.  Prevention  · Avoid tobacco smoke. If you smoke, quit. Stay away from smoky places. Ask friends and family not to smoke around you, or in your home or car.  · Get checked for allergies.  · Ask your provider about getting a yearly flu shot. Also ask about pneumococcal or pneumonia shots.  · Wash your hands often. This helps reduce the chance of picking up viruses that cause colds and flu.  Call your healthcare provider if:  · Symptoms worsen, or you have new symptoms  · Breathing problems worsen or  become severe  · Symptoms dont get better within a week, or within 3 days of taking antibiotics   Date Last Reviewed: 2/1/2017  © 9494-4697 [employer]. [address] [place] [address]. All rights reserved. This information is not intended as a substitute for professional medical care. Always follow your healthcare professional's instructions.        Acute Bronchitis  Your healthcare provider has told you that you have acute bronchitis. Bronchitis is infection or inflammation of the bronchial tubes (airways in the lungs). Normally, air moves easily in and out of the airways. Bronchitis narrows the airways, making it harder for air to flow in and out of the lungs. This causes symptoms such as shortness of breath, coughing up yellow or green mucus, and wheezing. Bronchitis can be acute or chronic. Acute means the condition comes on quickly and goes away in a short time, usually within 3 to 10 days.  Chronic means a condition lasts a long time and often comes back.    What causes acute bronchitis?  Acute bronchitis almost always starts as a viral respiratory infection, such as a cold or the flu. Certain factors make it more likely for a cold or flu to turn into bronchitis. These include being very young, being elderly, having a heart or lung problem, or having a weak immune system. Cigarette smoking also makes bronchitis more likely.  When bronchitis develops, the airways become swollen. The airways may also become infected with bacteria. This is known as a secondary infection.  Diagnosing acute bronchitis  Your healthcare provider will examine you and ask about your symptoms and health history. You may also have a sputum culture to test the fluid in your lungs. Chest X-rays may be done to look for infection in the lungs.  Treating acute bronchitis  Bronchitis usually clears up as the cold or flu goes away. You can help feel better faster by doing the following:  · Take medicine as directed. You may be told to take ibuprofen or other over-the-counter medicines. These help relieve inflammation in your bronchial tubes. Your healthcare provider may prescribe an inhaler to help open up the bronchial tubes. Most of the time, acute bronchitis is caused by a viral infection. Antibiotics are usually not prescribed for viral infections.  · Drink plenty of fluids, such as water, juice, or warm soup. Fluids loosen mucus so that you can cough it up. This helps you breathe more easily. Fluids also prevent dehydration.  · Make sure you get plenty of rest.  · Do not smoke. Do not allow anyone else to smoke in your home.  Recovery and follow-up  Follow up with your doctor as you are told. You will likely feel better in a week or two. But a dry cough can linger beyond that time. Let your doctor know if you still have symptoms (other than a dry cough) after 2 weeks, or if youre prone to getting bronchial infections. Take steps  to protect yourself from future infections. These steps include stopping smoking and avoiding tobacco smoke, washing your hands often, and getting a yearly flu shot.  When to call your healthcare provider  Call the healthcare provider if you have any of the following:  · Fever of 100.4°F (38.0°C) or higher, or as advised  · Symptoms that get worse, or new symptoms  · Trouble breathing  · Symptoms that dont start to improve within a week, or within 3 days of taking antibiotics   Date Last Reviewed: 12/1/2016  © 8093-9328 [employer]. [address] [place] [address]. All rights reserved. This information is not intended as a substitute for professional medical care. Always follow your healthcare professional's instructions.

## 2019-07-18 ENCOUNTER — OFFICE VISIT (OUTPATIENT)
Dept: OBSTETRICS AND GYNECOLOGY | Facility: CLINIC | Age: 44
End: 2019-07-18
Payer: COMMERCIAL

## 2019-07-18 VITALS
SYSTOLIC BLOOD PRESSURE: 148 MMHG | BODY MASS INDEX: 40.73 KG/M2 | HEIGHT: 65 IN | DIASTOLIC BLOOD PRESSURE: 90 MMHG | WEIGHT: 244.5 LBS

## 2019-07-18 DIAGNOSIS — T81.89XA PROBLEM INVOLVING SURGICAL INCISION: Primary | ICD-10-CM

## 2019-07-18 PROBLEM — Z01.419 ENCOUNTER FOR WELL WOMAN EXAM WITH ROUTINE GYNECOLOGICAL EXAM: Status: RESOLVED | Noted: 2019-05-13 | Resolved: 2019-07-18

## 2019-07-18 PROCEDURE — 99999 PR PBB SHADOW E&M-EST. PATIENT-LVL III: ICD-10-PCS | Mod: PBBFAC,,, | Performed by: OBSTETRICS & GYNECOLOGY

## 2019-07-18 PROCEDURE — 99999 PR PBB SHADOW E&M-EST. PATIENT-LVL III: CPT | Mod: PBBFAC,,, | Performed by: OBSTETRICS & GYNECOLOGY

## 2019-07-18 PROCEDURE — 99024 PR POST-OP FOLLOW-UP VISIT: ICD-10-PCS | Mod: S$GLB,,, | Performed by: OBSTETRICS & GYNECOLOGY

## 2019-07-18 PROCEDURE — 99024 POSTOP FOLLOW-UP VISIT: CPT | Mod: S$GLB,,, | Performed by: OBSTETRICS & GYNECOLOGY

## 2019-07-18 NOTE — PROGRESS NOTES
Subjective:       Patient ID: Haydee Allen is a 44 y.o. female.    Chief Complaint:  opened incision from surgery      History of Present Illness  HPI  Postoperative Follow-up  Patient presents to the clinic 2 weeks status post total laparoscopic hysterectomy for abnormal uterine bleeding. Eating a regular diet without difficulty. Bowel movements are normal. Pain is controlled without any medications.Patient went to ER yesterday when she noticed dark red blood draining from her incision on the left of the umbilicus.      GYN & OB History  Patient's last menstrual period was 2019.   Date of Last Pap: 2019    OB History    Para Term  AB Living   4 4 4         SAB TAB Ectopic Multiple Live Births                  # Outcome Date GA Lbr Fidencio/2nd Weight Sex Delivery Anes PTL Lv   4 Term            3 Term            2 Term            1 Term                Review of Systems  Review of Systems   Constitutional: Negative for activity change, appetite change, chills, diaphoresis, fatigue, fever and unexpected weight change.   HENT: Negative for nasal congestion, mouth sores and tinnitus.    Eyes: Negative for discharge and visual disturbance.   Respiratory: Negative for cough, shortness of breath and wheezing.    Cardiovascular: Negative for chest pain, palpitations and leg swelling.   Gastrointestinal: Negative for abdominal pain, bloating, blood in stool, constipation, diarrhea, nausea, vomiting, reflux and fecal incontinence.   Endocrine: Positive for diabetes and hypothyroidism. Negative for hair loss, hot flashes and hyperthyroidism.   Genitourinary: Negative for bladder incontinence, decreased libido, dysmenorrhea, dyspareunia, dysuria, flank pain, frequency, genital sores, hematuria, hot flashes, menorrhagia, menstrual problem, pelvic pain, urgency, vaginal bleeding, vaginal discharge, vaginal pain, urinary incontinence, postcoital bleeding, postmenopausal bleeding, vaginal dryness and  vaginal odor.   Musculoskeletal: Negative for arthralgias, back pain, joint swelling, leg pain and myalgias.   Integumentary:  Negative for rash, acne, hair changes, mole/lesion, breast mass, nipple discharge and breast skin changes.   Neurological: Negative for vertigo, seizures, syncope, numbness and headaches.   Hematological: Negative for adenopathy. Does not bruise/bleed easily.   Psychiatric/Behavioral: Negative for depression and sleep disturbance. The patient is nervous/anxious.    Breast: Negative for asymmetry, breast self exam, lump, mass, mastodynia, nipple discharge and skin changes          Objective:    Physical Exam:   Constitutional: She is oriented to person, place, and time. She appears well-developed and well-nourished. No distress.    HENT:   Head: Normocephalic.   Nose: No epistaxis.    Eyes: Pupils are equal, round, and reactive to light.    Neck: Normal range of motion.    Cardiovascular: Normal rate.     Pulmonary/Chest: Effort normal.        Abdominal: Soft. She exhibits abdominal incision. She exhibits no distension.   There is noted dark sanguinous drainage from 10 mm incision to the left of the umbilicus. This is over the subcutaneous hematoma that had developed immediately after surgery. The hematoma is resolving slowly.The incision is cleaned with sterile gauze soaked with H2O2.Sterile dressing then applied.     Genitourinary:   Genitourinary Comments: deferred           Musculoskeletal: Normal range of motion and moves all extremeties.       Neurological: She is alert and oriented to person, place, and time.    Skin: Skin is warm and dry. She is not diaphoretic.    Psychiatric: She has a normal mood and affect. Her behavior is normal. Judgment and thought content normal.          Assessment:        1. Problem involving surgical incision                Plan:      Wound care instruction given and patient to return if any signs of infection should develop

## 2019-07-29 DIAGNOSIS — E11.9 TYPE 2 DIABETES MELLITUS WITHOUT COMPLICATION, WITH LONG-TERM CURRENT USE OF INSULIN: ICD-10-CM

## 2019-07-29 DIAGNOSIS — Z79.4 TYPE 2 DIABETES MELLITUS WITHOUT COMPLICATION, WITH LONG-TERM CURRENT USE OF INSULIN: ICD-10-CM

## 2019-07-29 DIAGNOSIS — I10 ESSENTIAL HYPERTENSION: ICD-10-CM

## 2019-07-29 RX ORDER — LISINOPRIL 20 MG/1
TABLET ORAL
Qty: 30 TABLET | Refills: 1 | Status: SHIPPED | OUTPATIENT
Start: 2019-07-29 | End: 2019-12-19

## 2019-07-29 RX ORDER — METFORMIN HYDROCHLORIDE 1000 MG/1
TABLET ORAL
Qty: 180 TABLET | Refills: 0 | Status: SHIPPED | OUTPATIENT
Start: 2019-07-29 | End: 2019-12-19

## 2019-08-05 ENCOUNTER — TELEPHONE (OUTPATIENT)
Dept: FAMILY MEDICINE | Facility: CLINIC | Age: 44
End: 2019-08-05

## 2019-08-05 NOTE — TELEPHONE ENCOUNTER
----- Message from Dalton Navas NP sent at 7/29/2019  4:43 PM CDT -----   Please remind pt she is to have labs in 4 weeks  ----- Message -----  From: Carrie Dent  Sent: 7/23/2019  11:16 AM  To: Dalton Navas NP    We need an a1c on this pt. It looks like you ordered labs but are they to be completed before next visit in October or can we have her do the labs now? Thanks

## 2019-08-13 ENCOUNTER — OFFICE VISIT (OUTPATIENT)
Dept: OBSTETRICS AND GYNECOLOGY | Facility: CLINIC | Age: 44
End: 2019-08-13
Payer: COMMERCIAL

## 2019-08-13 VITALS
DIASTOLIC BLOOD PRESSURE: 98 MMHG | WEIGHT: 237.19 LBS | BODY MASS INDEX: 39.52 KG/M2 | HEIGHT: 65 IN | SYSTOLIC BLOOD PRESSURE: 142 MMHG | RESPIRATION RATE: 18 BRPM

## 2019-08-13 DIAGNOSIS — N95.1 MENOPAUSE SYNDROME: Primary | ICD-10-CM

## 2019-08-13 PROBLEM — N92.0 MENORRHAGIA WITH REGULAR CYCLE: Status: RESOLVED | Noted: 2019-06-17 | Resolved: 2019-08-13

## 2019-08-13 PROBLEM — T81.89XA PROBLEM INVOLVING SURGICAL INCISION: Status: RESOLVED | Noted: 2019-07-18 | Resolved: 2019-08-13

## 2019-08-13 PROBLEM — N83.291 COMPLEX CYST OF RIGHT OVARY: Status: RESOLVED | Noted: 2019-06-17 | Resolved: 2019-08-13

## 2019-08-13 PROCEDURE — 99024 PR POST-OP FOLLOW-UP VISIT: ICD-10-PCS | Mod: S$GLB,,, | Performed by: OBSTETRICS & GYNECOLOGY

## 2019-08-13 PROCEDURE — 99024 POSTOP FOLLOW-UP VISIT: CPT | Mod: S$GLB,,, | Performed by: OBSTETRICS & GYNECOLOGY

## 2019-08-13 PROCEDURE — 99999 PR PBB SHADOW E&M-EST. PATIENT-LVL III: CPT | Mod: PBBFAC,,, | Performed by: OBSTETRICS & GYNECOLOGY

## 2019-08-13 PROCEDURE — 99999 PR PBB SHADOW E&M-EST. PATIENT-LVL III: ICD-10-PCS | Mod: PBBFAC,,, | Performed by: OBSTETRICS & GYNECOLOGY

## 2019-08-13 RX ORDER — ESTRADIOL 0.1 MG/D
1 FILM, EXTENDED RELEASE TRANSDERMAL
Qty: 8 PATCH | Refills: 11 | Status: SHIPPED | OUTPATIENT
Start: 2019-08-15 | End: 2019-12-19

## 2019-08-13 NOTE — PROGRESS NOTES
Subjective:       Patient ID: Haydee Allen is a 44 y.o. female.    Chief Complaint:  Post-op Evaluation; Hot Flashes; and intermittent sharp pain in abdomen      History of Present Illness  HPI  Postoperative Follow-up  Patient presents to the clinic 6 weeks status post total laparoscopic hysterectomy for menorrhagia and a right adnexal mass diagnosed as a serous cystadenoma. Eating a regular diet without difficulty. Bowel movements are normal. The patient is not having any pain.      GYN & OB History  Patient's last menstrual period was 2019.   Date of Last Pap: 2019    OB History    Para Term  AB Living   4 4 4         SAB TAB Ectopic Multiple Live Births                  # Outcome Date GA Lbr Fidencio/2nd Weight Sex Delivery Anes PTL Lv   4 Term            3 Term            2 Term            1 Term                Review of Systems  Review of Systems   Constitutional: Negative for activity change, appetite change, chills, diaphoresis, fatigue, fever and unexpected weight change.   HENT: Negative for nasal congestion, mouth sores and tinnitus.    Eyes: Negative for discharge and visual disturbance.   Respiratory: Negative for cough, shortness of breath and wheezing.    Cardiovascular: Negative for chest pain, palpitations and leg swelling.   Gastrointestinal: Negative for abdominal pain, bloating, blood in stool, constipation, diarrhea, nausea, vomiting, reflux and fecal incontinence.   Endocrine: Positive for diabetes and hypothyroidism. Negative for hair loss, hot flashes and hyperthyroidism.   Genitourinary: Positive for hot flashes and vaginal discharge. Negative for bladder incontinence, decreased libido, dysmenorrhea, dyspareunia, dysuria, flank pain, frequency, genital sores, hematuria, menorrhagia, menstrual problem, pelvic pain, urgency, vaginal bleeding, vaginal pain, urinary incontinence, postcoital bleeding, postmenopausal bleeding, vaginal dryness and vaginal odor.    Musculoskeletal: Negative for arthralgias, back pain, joint swelling, leg pain and myalgias.   Integumentary:  Negative for rash, acne, hair changes, mole/lesion, breast mass, nipple discharge, breast skin changes and breast tenderness.   Neurological: Negative for vertigo, seizures, syncope, numbness and headaches.   Hematological: Negative for adenopathy. Does not bruise/bleed easily.   Psychiatric/Behavioral: Negative for depression and sleep disturbance. The patient is nervous/anxious.    Breast: Negative for asymmetry, breast self exam, lump, mass, mastodynia, nipple discharge, skin changes and tenderness          Objective:    Physical Exam:   Constitutional: She is oriented to person, place, and time. She appears well-developed and well-nourished. No distress.    HENT:   Head: Normocephalic.   Nose: No epistaxis.    Eyes: Pupils are equal, round, and reactive to light.    Neck: Normal range of motion.    Cardiovascular: Normal rate.     Pulmonary/Chest: Effort normal.        Abdominal: Soft. She exhibits no distension and no mass. There is no tenderness. There is no rebound and no guarding. No hernia.   Incision sites well healed with no erythema,induration,ecchymosis,or drainage     Genitourinary:   Genitourinary Comments: Vaginal cuff is healing well. There is slight vaginal discharge and some tenderness along cuff           Musculoskeletal: Normal range of motion and moves all extremeties.       Neurological: She is alert and oriented to person, place, and time.    Skin: Skin is warm and dry. She is not diaphoretic.    Psychiatric: She has a normal mood and affect. Her behavior is normal. Judgment and thought content normal.          Assessment:        1. Menopause syndrome                Plan:      Estradiol 0.1 mg patch twice per week  Note given to return to work on 08/26/19

## 2019-08-16 LAB
ALBUMIN SERPL-MCNC: 3.8 G/DL (ref 3.6–5.1)
ALBUMIN/GLOB SERPL: 1.3 (CALC) (ref 1–2.5)
ALP SERPL-CCNC: 81 U/L (ref 33–115)
ALT SERPL-CCNC: 6 U/L (ref 6–29)
AST SERPL-CCNC: 9 U/L (ref 10–35)
BASOPHILS # BLD AUTO: 48 CELLS/UL (ref 0–200)
BASOPHILS NFR BLD AUTO: 0.6 %
BILIRUB SERPL-MCNC: 0.4 MG/DL (ref 0.2–1.2)
BUN SERPL-MCNC: 10 MG/DL (ref 7–25)
BUN/CREAT SERPL: ABNORMAL (CALC) (ref 6–22)
CALCIUM SERPL-MCNC: 8.9 MG/DL (ref 8.6–10.2)
CHLORIDE SERPL-SCNC: 103 MMOL/L (ref 98–110)
CHOLEST SERPL-MCNC: 116 MG/DL
CHOLEST/HDLC SERPL: 3.3 (CALC)
CO2 SERPL-SCNC: 25 MMOL/L (ref 20–32)
CREAT SERPL-MCNC: 0.55 MG/DL (ref 0.5–1.1)
EOSINOPHIL # BLD AUTO: 376 CELLS/UL (ref 15–500)
EOSINOPHIL NFR BLD AUTO: 4.7 %
ERYTHROCYTE [DISTWIDTH] IN BLOOD BY AUTOMATED COUNT: 20 % (ref 11–15)
GFRSERPLBLD MDRD-ARVRAT: 112 ML/MIN/1.73M2
GLOBULIN SER CALC-MCNC: 2.9 G/DL (CALC) (ref 1.9–3.7)
GLUCOSE SERPL-MCNC: 215 MG/DL (ref 65–99)
HBA1C MFR BLD: 8.5 % OF TOTAL HGB
HCT VFR BLD AUTO: 29.4 % (ref 35–45)
HDLC SERPL-MCNC: 35 MG/DL
HGB BLD-MCNC: 8.5 G/DL (ref 11.7–15.5)
LDLC SERPL CALC-MCNC: 63 MG/DL (CALC)
LYMPHOCYTES # BLD AUTO: 2024 CELLS/UL (ref 850–3900)
LYMPHOCYTES NFR BLD AUTO: 25.3 %
MCH RBC QN AUTO: 20.1 PG (ref 27–33)
MCHC RBC AUTO-ENTMCNC: 28.9 G/DL (ref 32–36)
MCV RBC AUTO: 69.5 FL (ref 80–100)
MONOCYTES # BLD AUTO: 336 CELLS/UL (ref 200–950)
MONOCYTES NFR BLD AUTO: 4.2 %
MORPHOLOGY BLD-IMP: NORMAL
NEUTROPHILS # BLD AUTO: 5216 CELLS/UL (ref 1500–7800)
NEUTROPHILS NFR BLD AUTO: 65.2 %
NONHDLC SERPL-MCNC: 81 MG/DL (CALC)
PLATELET # BLD AUTO: 316 THOUSAND/UL (ref 140–400)
PMV BLD REES-ECKER: ABNORMAL FL (ref 7.5–12.5)
POTASSIUM SERPL-SCNC: 3.9 MMOL/L (ref 3.5–5.3)
PROT SERPL-MCNC: 6.7 G/DL (ref 6.1–8.1)
RBC # BLD AUTO: 4.23 MILLION/UL (ref 3.8–5.1)
SODIUM SERPL-SCNC: 139 MMOL/L (ref 135–146)
TRIGL SERPL-MCNC: 94 MG/DL
TSH SERPL-ACNC: 0.2 MIU/L
WBC # BLD AUTO: 8 THOUSAND/UL (ref 3.8–10.8)

## 2019-08-21 ENCOUNTER — TELEPHONE (OUTPATIENT)
Dept: FAMILY MEDICINE | Facility: CLINIC | Age: 44
End: 2019-08-21

## 2019-08-21 NOTE — TELEPHONE ENCOUNTER
Called pt again, LM with a family member req call back from pt to [facility] appt.

## 2019-08-21 NOTE — TELEPHONE ENCOUNTER
MOM req call back from pt to [facility] to discuss labs.    Thyroid labs were not run and couldn't be added on.

## 2019-08-21 NOTE — TELEPHONE ENCOUNTER
----- Message from Dalton Navas NP sent at 8/19/2019 10:16 AM CDT -----  Patient needs office visit to discuss labs. Can you check on the additional thyroid labs  Have her bring her medications to the [facility]

## 2019-08-25 RX ORDER — LEVOTHYROXINE SODIUM 300 UG/1
TABLET ORAL
Qty: 30 TABLET | Refills: 0 | Status: SHIPPED | OUTPATIENT
Start: 2019-08-25 | End: 2019-10-21 | Stop reason: SDUPTHER

## 2019-10-17 ENCOUNTER — TELEPHONE (OUTPATIENT)
Dept: FAMILY MEDICINE | Facility: CLINIC | Age: 44
End: 2019-10-17

## 2019-10-21 RX ORDER — LEVOTHYROXINE SODIUM 300 UG/1
TABLET ORAL
Qty: 30 TABLET | Refills: 0 | Status: SHIPPED | OUTPATIENT
Start: 2019-10-21 | End: 2019-11-20

## 2019-10-22 NOTE — TELEPHONE ENCOUNTER
Samantha she is due for an appt  What dose of synthroid is she taking? She was supposed to bring her bottles of medication to her appt

## 2019-10-23 NOTE — TELEPHONE ENCOUNTER
Pts family member answered phone and stated pt is at work. Req call back from pt to discuss medication dosage.

## 2019-10-29 ENCOUNTER — TELEPHONE (OUTPATIENT)
Dept: FAMILY MEDICINE | Facility: CLINIC | Age: 44
End: 2019-10-29

## 2019-11-30 DIAGNOSIS — E11.9 TYPE 2 DIABETES MELLITUS WITHOUT COMPLICATION, WITH LONG-TERM CURRENT USE OF INSULIN: ICD-10-CM

## 2019-11-30 DIAGNOSIS — Z79.4 TYPE 2 DIABETES MELLITUS WITHOUT COMPLICATION, WITH LONG-TERM CURRENT USE OF INSULIN: ICD-10-CM

## 2019-12-02 RX ORDER — ATORVASTATIN CALCIUM 10 MG/1
TABLET, FILM COATED ORAL
Qty: 90 TABLET | Refills: 0 | Status: SHIPPED | OUTPATIENT
Start: 2019-12-02 | End: 2019-12-19 | Stop reason: SDUPTHER

## 2019-12-03 ENCOUNTER — TELEPHONE (OUTPATIENT)
Dept: FAMILY MEDICINE | Facility: CLINIC | Age: 44
End: 2019-12-03

## 2019-12-03 NOTE — TELEPHONE ENCOUNTER
The following medication needs a prior authorization:     Medication Name: xultophy   100u-3.6mg/mL    Dosage: 16 units    Frequency: daily    Directions for use: inject 16 units into the skin once daily    Diagnosis: E11.65 type 2 diabetes    Is the request for a reauthorization? no    Is the patient currently stable on therapy? yes    Please list all therapeutic alternatives previously used with start/end dates and outcome:    Metformin 7/29/19-present

## 2019-12-19 ENCOUNTER — OFFICE VISIT (OUTPATIENT)
Dept: FAMILY MEDICINE | Facility: CLINIC | Age: 44
End: 2019-12-19
Payer: COMMERCIAL

## 2019-12-19 VITALS
SYSTOLIC BLOOD PRESSURE: 132 MMHG | BODY MASS INDEX: 39.15 KG/M2 | DIASTOLIC BLOOD PRESSURE: 94 MMHG | HEART RATE: 86 BPM | HEIGHT: 65 IN | WEIGHT: 235 LBS | OXYGEN SATURATION: 98 %

## 2019-12-19 DIAGNOSIS — E11.9 TYPE 2 DIABETES MELLITUS WITHOUT COMPLICATION, WITH LONG-TERM CURRENT USE OF INSULIN: ICD-10-CM

## 2019-12-19 DIAGNOSIS — K59.00 CONSTIPATION, UNSPECIFIED CONSTIPATION TYPE: ICD-10-CM

## 2019-12-19 DIAGNOSIS — F41.8 MIXED ANXIETY AND DEPRESSIVE DISORDER: ICD-10-CM

## 2019-12-19 DIAGNOSIS — I10 ESSENTIAL HYPERTENSION: Primary | ICD-10-CM

## 2019-12-19 DIAGNOSIS — E03.9 ACQUIRED HYPOTHYROIDISM: ICD-10-CM

## 2019-12-19 DIAGNOSIS — Z79.4 TYPE 2 DIABETES MELLITUS WITHOUT COMPLICATION, WITH LONG-TERM CURRENT USE OF INSULIN: ICD-10-CM

## 2019-12-19 DIAGNOSIS — D64.9 ANEMIA, UNSPECIFIED TYPE: ICD-10-CM

## 2019-12-19 PROBLEM — N30.00 ACUTE CYSTITIS: Status: RESOLVED | Noted: 2017-10-30 | Resolved: 2019-12-19

## 2019-12-19 PROBLEM — R92.8 ABNORMAL SCREENING MAMMOGRAM: Status: RESOLVED | Noted: 2019-05-31 | Resolved: 2019-12-19

## 2019-12-19 PROBLEM — F41.9 ANXIETY: Status: RESOLVED | Noted: 2017-09-21 | Resolved: 2019-12-19

## 2019-12-19 PROCEDURE — 99214 PR OFFICE/OUTPT VISIT, EST, LEVL IV, 30-39 MIN: ICD-10-PCS | Mod: S$GLB,,, | Performed by: NURSE PRACTITIONER

## 2019-12-19 PROCEDURE — 3080F DIAST BP >= 90 MM HG: CPT | Mod: S$GLB,,, | Performed by: NURSE PRACTITIONER

## 2019-12-19 PROCEDURE — 99214 OFFICE O/P EST MOD 30 MIN: CPT | Mod: S$GLB,,, | Performed by: NURSE PRACTITIONER

## 2019-12-19 PROCEDURE — 3008F PR BODY MASS INDEX (BMI) DOCUMENTED: ICD-10-PCS | Mod: S$GLB,,, | Performed by: NURSE PRACTITIONER

## 2019-12-19 PROCEDURE — 3075F PR MOST RECENT SYSTOLIC BLOOD PRESS GE 130-139MM HG: ICD-10-PCS | Mod: S$GLB,,, | Performed by: NURSE PRACTITIONER

## 2019-12-19 PROCEDURE — 3075F SYST BP GE 130 - 139MM HG: CPT | Mod: S$GLB,,, | Performed by: NURSE PRACTITIONER

## 2019-12-19 PROCEDURE — 3008F BODY MASS INDEX DOCD: CPT | Mod: S$GLB,,, | Performed by: NURSE PRACTITIONER

## 2019-12-19 PROCEDURE — 3080F PR MOST RECENT DIASTOLIC BLOOD PRESSURE >= 90 MM HG: ICD-10-PCS | Mod: S$GLB,,, | Performed by: NURSE PRACTITIONER

## 2019-12-19 RX ORDER — DOCUSATE SODIUM 100 MG/1
100 CAPSULE, LIQUID FILLED ORAL 2 TIMES DAILY PRN
Qty: 60 CAPSULE | Refills: 2 | Status: SHIPPED | OUTPATIENT
Start: 2019-12-19 | End: 2020-02-10 | Stop reason: SDUPTHER

## 2019-12-19 RX ORDER — LEVOTHYROXINE SODIUM 50 UG/1
50 CAPSULE ORAL
Qty: 30 CAPSULE | Refills: 2 | Status: SHIPPED | OUTPATIENT
Start: 2019-12-19 | End: 2020-02-10 | Stop reason: DRUGHIGH

## 2019-12-19 RX ORDER — METFORMIN HYDROCHLORIDE 500 MG/1
1000 TABLET, EXTENDED RELEASE ORAL
Qty: 60 TABLET | Refills: 2 | Status: SHIPPED | OUTPATIENT
Start: 2019-12-19 | End: 2020-02-10 | Stop reason: SDUPTHER

## 2019-12-19 RX ORDER — LISINOPRIL 40 MG/1
40 TABLET ORAL DAILY
Qty: 30 TABLET | Refills: 2 | Status: SHIPPED | OUTPATIENT
Start: 2019-12-19 | End: 2020-02-10 | Stop reason: SDUPTHER

## 2019-12-19 RX ORDER — SERTRALINE HYDROCHLORIDE 50 MG/1
TABLET, FILM COATED ORAL
Qty: 30 TABLET | Refills: 2 | Status: SHIPPED | OUTPATIENT
Start: 2019-12-19 | End: 2020-02-10 | Stop reason: SDUPTHER

## 2019-12-19 RX ORDER — ATORVASTATIN CALCIUM 10 MG/1
10 TABLET, FILM COATED ORAL DAILY
Qty: 90 TABLET | Refills: 1 | Status: SHIPPED | OUTPATIENT
Start: 2019-12-19 | End: 2020-02-10 | Stop reason: SDUPTHER

## 2019-12-19 NOTE — PATIENT INSTRUCTIONS
Constipation (Adult)  Constipation means that you have bowel movements that are less frequent than usual. Stools often become very hard and difficult to pass.  Constipation is very common. At some point in life it affects almost everyone. Since everyone's bowel habits are different, what is constipation to one person may not be to another. Your healthcare provider may do tests to diagnose constipation. It depends on what he or she finds when evaluating you.    Symptoms of constipation include:  · Abdominal pain  · Bloating  · Vomiting  · Painful bowel movements  · Itching, swelling, bleeding, or pain around the anus  Causes  Constipation can have many causes. These include:  · Diet low in fiber  · Too much dairy  · Not drinking enough liquids  · Lack of exercise or physical activity. This is especially true for older adults.  · Changes in lifestyle or daily routine, including pregnancy, aging, work, and travel  · Frequent use or misuse of laxatives  · Ignoring the urge to have a bowel movement or delaying it until later  · Medicines, such as certain prescription pain medicines, iron supplements, antacids, certain antidepressants, and calcium supplements  · Diseases like irritable bowel syndrome, bowel obstructions, stroke, diabetes, thyroid disease, Parkinson disease, hemorrhoids, and colon cancer  Complications  Potential complications of constipation can include:  · Hemorrhoids  · Rectal bleeding from hemorrhoids or anal fissures (skin tears)  · Hernias  · Dependency on laxatives  · Chronic constipation  · Fecal impaction  · Bowel obstruction or perforation  Home care  All treatment should be done after talking with your healthcare provider. This is especially true if you have another medical problems, are taking prescription medicines, or are an older adult. Treatment most often involves lifestyle changes. You may also need medicines. Your healthcare provider will tell you which will work best for you. Follow  the advice below to help avoid this problem in the future.  Lifestyle changes  These lifestyle changes can help prevent constipation:  · Diet. Eat a high-fiber diet, with fresh fruit and vegetables, and reduce dairy intake, meats, and processed foods  · Fluids. It's important to get enough fluids each day. Drink plenty of water when you eat more fiber. If you are on diet that limits the amount of fluid you can have, talk about this with your healthcare provider.  · Regular exercise. Check with your healthcare provider first.  Medications  Take any medicines as directed. Some laxatives are safe to use only every now and then. Others can be taken on a regular basis. Talk with your doctor or pharmacist if you have questions.  Prescription pain medicines can cause constipation. If you are taking this kind of medicine, ask your healthcare provider if you should also take a stool softener.  Medicines you may take to treat constipation include:  · Fiber supplements  · Stool softeners  · Laxatives  · Enemas  · Rectal suppositories  Follow-up care  Follow up with your healthcare provider if symptoms don't get better in the next few days. You may need to have more tests or see a specialist.  Call 911  Call 911 if any of these occur:  · Trouble breathing  · Stiff, rigid abdomen that is severely painful to touch  · Confusion  · Fainting or loss of consciousness  · Rapid heart rate  · Chest pain  When to seek medical advice  Call your healthcare provider right away if any of these occur:  · Fever over 100.4°F (38°C)  · Failure to resume normal bowel movements  · Pain in your abdomen or back gets worse  · Nausea or vomiting  · Swelling in your abdomen  · Blood in the stool  · Black, tarry stool  · Involuntary weight loss  · Weakness  Date Last Reviewed: 12/30/2015  © 3974-9761 [employer]. [address] [place] [address], [place] [address]. All rights reserved. This information is not intended as a substitute for  professional medical care. Always follow your healthcare professional's instructions.        Diet: Diabetes  Food is an important tool that you can use to control diabetes and stay healthy. Eating well-balanced meals in the correct amounts will help you control your blood glucose levels and prevent low blood sugar reactions. It will also help you reduce the health risks of diabetes. There is no one specific diet that is right for everyone with diabetes. But there are general guidelines to follow. A registered dietitian ([facility]) will create a tailored diet approach thats just right for you. He or she will also help you plan healthy meals and snacks. If you have any questions, call your dietitian for advice.     Guidelines for success  Talk with your healthcare provider before starting a diabetes diet or weight loss program. If you haven't talked with a dietitian yet, ask your provider for a referral. The following guidelines can help you succeed:  · Select foods from the 6 food groups below. Your dietitian will help you find food choices within each group. He or she will also show you serving sizes and how many servings you can have at each meal.  ¨ Grains, beans, and starchy vegetables  ¨ Vegetables  ¨ Fruit  ¨ Milk or yogurt  ¨ Meat, poultry, fish, or tofu  ¨ Healthy fats  · Check your blood sugar levels as directed by your provider. Take any medicine as prescribed by your provider.  · Learn to read food labels and pick the right portion sizes.  · Eat only the amount of food in your meal plan. Eat about the same amount of food at regular times each day. Dont skip meals. Eat meals 4 to 5 hours apart, with snacks in between.  · Limit alcohol. It raises blood sugar levels. Drink water or calorie-free diet drinks that use safe sweeteners.  · Eat less fat to help lower your risk of heart disease. Use nonfat or low-fat dairy products and lean meats. Avoid fried foods. Use cooking oils that are unsaturated, such as olive,  canola, or peanut oil.  · Talk with your dietitian about safe sugar substitutes.  · Avoid added salt. It can contribute to high blood pressure, which can cause heart disease. People with diabetes already have a risk of high blood pressure and heart disease.  · Stay at a healthy weight. If you need to lose weight, cut down on your portion sizes. But dont skip meals. Exercise is an important part of any weight management program. Talk with your provider about an exercise program thats right for you.  · For more information about the best diet plan for you, talk with a registered dietitian (RD). To find an RD in your area, contact:  ¨ Academy of Nutrition and Dietetics www.eatright.org  ¨ The American Diabetes Association [phone] www.diabetes.org  Date Last Reviewed: 8/1/2016  © 1587-5108 [employer]. [address] [place] [address]. All rights reserved. This information is not intended as a substitute for professional medical care. Always follow your healthcare professional's instructions.        Diabetes: Meal Planning    You can help keep your blood sugar level in your target range by eating healthy foods. Your healthcare team can help you create a low-fat, nutritious meal plan. Take an active role in your diabetes management by following your meal plan and working with your healthcare team.  Make your meal plan  A meal plan gives guidelines for the types and amounts of food you should eat. The goal is to balance food and insulin (or other diabetes medications) so your blood sugars will be in your target range. Your dietitian will help you make a flexible meal plan that includes many foods that you like.  Watch serving sizes  Your meal plan will group foods by servings. To learn how much a serving is, start by measuring food portions at each meal. Soon youll know what a serving looks like on your plate. Ask your healthcare provider about how to balance servings of different  foods.  Eat from all the food groups  The basis of a healthy meal plan is variety (eating lots of different foods). Choose lean meats, fresh fruits and vegetables, whole grains, and low-fat or nonfat dairy products. Eating a wide variety of foods provides the nutrients your body needs. It can also keep you from getting bored with your meal plan.  Learn about carbohydrates, fats, and protein  · Carbohydrates are starches, sugars, and fiber. They are found in many foods, including fruit, bread, pasta, milk, and sweets. Of all the foods you eat, carbohydrates have the most effect on your blood sugar. Your dietitian may teach you about carb counting, a way to figure out the number of carbohydrates in a meal.  · Fats have the most calories. They also have the most effect on your weight and your risk of heart disease. When you have diabetes, its important to control your weight and protect your heart. Foods that are high in fat include whole milk, cheese, snack foods, and desserts.  · Protein is important for building and repairing muscles and bones. Choose low-fat protein sources, such as fish, egg whites, and skinless chicken.  Reduce liquid sugars  Extra calories from sodas, sports drinks, and fruit drinks make it hard to keep blood sugar in range. Cut as many liquid sugars from your meal plan as you can.  This includes most fruit juices, which are often high in natural or added sugar. Instead, drink plenty of water and other sugar-free beverages.  Eat less fat  If you need to lose weight, try to reduce the amount of fat in your diet. This can also help lower your cholesterol level to keep blood vessels healthier. Cut fat by using only small amounts of liquid oil for cooking. Read food labels carefully to avoid foods with unhealthy trans fats.  Timing your meals  When it comes to blood sugar control, when you eat is as important as what you eat. You may need to eat several small meals spaced evenly throughout the day  to stay in your target range. So dont skip breakfast or wait until late in the day to get most of your calories. Doing so can cause your blood sugar to rise too high or fall too low.   Date Last Reviewed: 3/1/2016  © 3148-5149 [employer]. [address] [place] [address]. All rights reserved. This information is not intended as a substitute for professional medical care. Always follow your healthcare professional's instructions.        Eating a High-Fiber Diet  Fiber is what gives strength and structure to plants. Most grains, beans, vegetables, and fruits contain fiber. Foods rich in fiber are often low in calories and fat, and they fill you up more. They may also reduce your risks for certain health problems. To find out the amount of fiber in canned, packaged, or frozen foods, read the Nutrition Facts label. It tells you how much fiber is in a serving.    Types of fiber and their benefits  There are two types of fiber: insoluble and soluble. They both aid digestion and help you maintain a healthy weight.  · Insoluble fiber. This is found in whole grains, cereals, certain fruits and vegetables such as apple skin, corn, and carrots. Insoluble fiber may prevent constipation and reduce the risk for certain types of cancer.  · Soluble fiber. This type of fiber is in oats, beans, and certain fruits and vegetables such as strawberries and peas. Soluble fiber can reduce cholesterol, which may help lower the risk for heart disease. It also helps control blood sugar levels.  Look for high-fiber foods  Try these foods to add fiber to your diet:  · Whole-grain breads and cereals. Try to eat 6 to 8 ounces a day. Include wheat and oat bran cereals, whole-wheat muffins or toast, and corn tortillas in your meals.  · Fruits. Try to eat 2 cups a day. Apples, oranges, strawberries, pears, and bananas are good sources. (Note: Fruit juice is low in fiber.)  · Vegetables. Try to eat at least 2.5 cups a day.  Add asparagus, carrots, broccoli, peas, and corn to your meals.  · Beans. One cup of cooked lentils gives you over 15 grams of fiber. Try navy beans, lentils, and chickpeas.  · Seeds. A small handful of seeds gives you about 3 grams of fiber. Try sunflower seeds.  Keep track of your fiber  Keep track of how much fiber you eat. Start by reading food labels. Then eat a variety of foods high in fiber. As you begin to eat more fiber, ask your healthcare provider how much water you should be drinking to keep your digestive system working smoothly.  You should aim for a certain amount of fiber in your diet each day. If you are a woman, that amount is between 25 and 28 grams per day. Men should aim for 30 to 33 grams per day. After age 50, your daily fiber needs drop to 22 grams for women and 28 grams for men.  Before you reach for the fiber supplements, think about this. Fiber is found naturally in healthy whole foods. It gives you that feeling of fullness after you eat. Taking fiber supplements or eating fiber-enriched foods will not give you this full feeling.  Your fiber intake is a good measure for the quality of your overall diet. If you are missing out on your daily amount of fiber, you may be lacking other important nutrients as well.  Date Last Reviewed: 5/11/2015  © 0880-6694 [employer]. [address] [place] [address]. All rights reserved. This information is not intended as a substitute for professional medical care. Always follow your healthcare professional's instructions.        Established High Blood Pressure    High blood pressure (hypertension) is a chronic disease. Often, healthcare providers dont know what causes it. But it can be caused by certain health conditions and medicines.  If you have high blood pressure, you may not have any symptoms. If you do have symptoms, they may include headache, dizziness, changes in your vision, chest pain, and shortness of breath. But even  without symptoms, high blood pressure thats not treated raises your risk for heart attack and stroke. High blood pressure is a serious health risk and shouldnt be ignored.  A blood pressure reading is made up of two numbers: a higher number over a lower number. The top number is the systolic pressure. The bottom number is the diastolic pressure. A normal blood pressure is a systolic pressure of  less than 120 over a diastolic pressure of less than 80. You will see your blood pressure readings written together. For example, a person with a systolic pressure of 188 and a diastolic pressure of 78 will have 118/78 written in the medical record.  High blood pressure is when either the top number is 140 or higher, or the bottom number is 90 or higher. This must be the result when taking your blood pressure a number of times. The blood pressures between normal and high are called prehypertension.  Home care  If you have high blood pressure, you should do what is listed below to lower your blood pressure. If you are taking medicines for high blood pressure, these methods may reduce or end your need for medicines in the future.  · Begin a weight-loss program if you are overweight.  · Cut back on how much salt you get in your diet. Heres how to do this:  ¨ Dont eat foods that have a lot of salt. These include olives, pickles, smoked meats, and salted potato chips.  ¨ Dont add salt to your food at the table.  ¨ Use only small amounts of salt when cooking.  · Start an exercise program. Talk with your healthcare provider about the type of exercise program that would be best for you. It doesn't have to be hard. Even brisk walking for 20 minutes 3 times a week is a good form of exercise.  · Dont take medicines that stimulate the heart. This includes many over-the-counter cold and sinus decongestant pills and sprays, as well as diet pills. Check the warnings about hypertension on the label. Before buying any over-the-counter  medicines or supplements, always ask the pharmacist about the product's potential interaction with your high blood pressure and your high blood pressure medicines.  · Stimulants such as amphetamine or cocaine could be deadly for someone with high blood pressure. Never take these.  · Limit how much caffeine you get in your diet. Switch to caffeine-free products.  · Stop smoking. If you are a long-time smoker, this can be hard. Talk to your healthcare provider about medicines and nicotine replacement options to help you. Also, enroll in a stop-smoking program to make it more likely that you will quit for good.  · Learn how to handle stress. This is an important part of any program to lower blood pressure. Learn about relaxation methods like meditation, yoga, or biofeedback.  · If your provider prescribed medicines, take them exactly as directed. Missing doses may cause your blood pressure get out of control.  · If you miss a dose or doses, check with your healthcare provider or pharmacist about what to do.  · Consider buying an automatic blood pressure machine. Ask your provider for a recommendation. You can get one of these at most pharmacies.     The American Heart Association recommends the following guidelines for home blood pressure monitoring:  · Don't smoke or drink coffee for 30 minutes before taking your blood pressure.  · Go to the bathroom before the test.  · Relax for 5 minutes before taking the measurement.  · Sit with your back supported (don't sit on a couch or soft chair); keep your feet on the floor uncrossed. Place your arm on a solid flat surface (like a table) with the upper part of the arm at heart level. Place the middle of the cuff directly above the eye of the elbow. Check the monitor's instruction manual for an illustration.  · Take multiple readings. When you measure, take 2 to 3 readings one minute apart and record all of the results.  · Take your blood pressure at the same time every day,  or as your healthcare provider recommends.  · Record the date, time, and blood pressure reading.  · Take the record with you to your next medical appointment. If your blood pressure monitor has a built-in memory, simply take the monitor with you to your next appointment.  · Call your provider if you have several high readings. Don't be frightened by a single high blood pressure reading, but if you get several high readings, check in with your healthcare provider.  · Note: When blood pressure reaches a systolic (top number) of 180 or higher OR diastolic (bottom number) of 110 or higher, seek emergency medical treatment.  Follow-up care  You will need to see your healthcare provider regularly. This is to check your blood pressure and to make changes to your medicines. Make a follow-up appointment as directed. Bring the record of your home blood pressure readings to the appointment.  When to seek medical advice  Call your healthcare provider right away if any of these occur:  · Blood pressure reaches a systolic (upper number) of 180 or higher OR a diastolic (bottom number) of 110 or higher  · Chest pain or shortness of breath  · Severe headache  · Throbbing or rushing sound in the ears  · Nosebleed  · Sudden severe pain in your belly (abdomen)  · Extreme drowsiness, confusion, or fainting  · Dizziness or spinning sensation (vertigo)  · Weakness of an arm or leg or one side of the face  · You have problems speaking or seeing   Date Last Reviewed: 12/1/2016  © 5406-2200 [employer]. [address] [place] [address]. All rights reserved. This information is not intended as a substitute for professional medical care. Always follow your healthcare professional's instructions.        Anxiety Reaction  Anxiety is the feeling we all get when we think something bad might happen. It is a normal response to stress and usually causes only a mild reaction. When anxiety becomes more severe, it can interfere  with daily life. In some cases, you may not even be aware of what it is youre anxious about. There may also be a genetic link or it may be a learned behavior in the home.  Both psychological and physical triggers cause stress reaction. It's often a response to fear or emotional stress, real or imagined. This stress may come from home, family, work, or social relationships.  During an anxiety reaction, you may feel:  · Helpless  · Nervous  · Depressed  · Irritable  Your body may show signs of anxiety in many ways. You may experience:  · Dry mouth  · Shakiness  · Dizziness  · Weakness  · Trouble breathing  · Breathing fast (hyperventilating)  · Chest pressure  · Sweating  · Headache  · Nausea  · Diarrhea  · Tiredness  · Inability to sleep  · Sexual problems  Home care  · Try to locate the sources of stress in your life. They may not be obvious. These may include:  ¨ Daily hassles of life (traffic jams, missed appointments, car troubles, etc.)  ¨ Major life changes, both good (new baby, job promotion) and bad (loss of job, loss of loved one)  ¨ Overload: feeling that you have too many responsibilities and can't take care of all of them at once  ¨ Feeling helpless, feeling that your problems are beyond what youre able to solve  · Notice how your body reacts to stress. Learn to listen to your body signals. This will help you take action before the stress becomes severe.  · When you can, do something about the source of your stress. (Avoid hassles, limit the amount of change that happens in your life at one time and take a break when you feel overloaded).  · Unfortunately, many stressful situations can't be avoided. It is necessary to learn how to better manage stress. There are many proven methods that will reduce your anxiety. These include simple things like exercise, good nutrition and adequate rest. Also, there are certain techniques that are helpful:  ¨ Relaxation  ¨ Breathing  exercises  ¨ Visualization  ¨ Biofeedback  ¨ Meditation  For more information about this, consult your doctor or go to a local bookstore and review the many books and tapes available on this subject.  Follow-up care  If you feel that your anxiety is not responding to self-help measures, contact your doctor or make an appointment with a counselor. You may need short-term psychological counseling and temporary medicine to help you manage stress.  Call 911  Call your healthcare provider right away if any of these occur:  · Trouble breathing  · Confusion  · Drowsiness or trouble wakening  · Fainting or loss of consciousness  · Rapid heart rate  · Seizure  · New chest pain that becomes more severe, lasts longer, or spreads into your shoulder, arm, neck, jaw, or back  When to seek medical advice  Call your healthcare provider right away if any of these occur:  · Your symptoms get worse  · Severe headache not relieved by rest and mild pain reliever  Date Last Reviewed: 9/29/2015  © 8357-0435 [employer]. [address] [place] [address], [place] [address]. All rights reserved. This information is not intended as a substitute for professional medical care. Always follow your healthcare professional's instructions.

## 2019-12-19 NOTE — PROGRESS NOTES
"    SUBJECTIVE:      Patient ID: Haydee Allen is a 44 y.o. female.    Chief Complaint: Diabetes; Headache; and Depression    Established patient of MUNDO Navas NP here to establish care- she is switching to me. She has numerous complaints today and is very tearful and upset in the room. She has a PMH of DM type 2, HTN, HLD, acquired hypothyroidism d/t thyroidectomy (overactive?- denies hx of thyroid cancer), recent GREG in 2019 d/t fibroids and heavy menses, and MERARI. She has been off of all of her diabetes medication as well as her cholesterol medication for the past few months d/t noncompliance with her care at [facility] with MUNDO Navas. She does not follow a diabetes diet and admits to drinking her sodas daily. She has been very upset and depressed, anxious most of the time and biting her fingernails "off" due to stress. She was previously on Zoloft months ago but was stopped by her previous PCP- she is unsure why and feels it was helping her symptoms. She denied any SE from the medication. She wants to participate in her health and get her diabetes under control and assures me she will participate in her care.     Diabetes   She presents for her follow-up diabetic visit. She has type 2 diabetes mellitus. Her disease course has been worsening. Hypoglycemia symptoms include headaches and nervousness/anxiousness. Pertinent negatives for hypoglycemia include no confusion or dizziness. Associated symptoms include fatigue and foot paresthesias (intermittent ). Pertinent negatives for diabetes include no blurred vision, no chest pain, no polydipsia, no polyuria, no weakness and no weight loss. There are no hypoglycemic complications. Symptoms are worsening. Risk factors for coronary artery disease include diabetes mellitus, dyslipidemia, obesity, hypertension and stress. When asked about current treatments, none were reported. Her weight is decreasing steadily. She is following a generally unhealthy diet. When " asked about meal planning, she reported none. She never participates in exercise. Home blood sugar record trend: not checking  An ACE inhibitor/angiotensin II receptor blocker is being taken. She does not see a podiatrist.Eye exam is current (1/2019 shows mild retinopathy ).   Hypertension   This is a chronic problem. The current episode started more than 1 year ago. The problem is uncontrolled. Associated symptoms include anxiety and headaches. Pertinent negatives include no blurred vision, chest pain, palpitations, peripheral edema or shortness of breath. Agents associated with hypertension include NSAIDs and thyroid hormones. Risk factors for coronary artery disease include obesity, sedentary lifestyle, stress, dyslipidemia and diabetes mellitus. Past treatments include ACE inhibitors. The current treatment provides moderate improvement. Compliance problems include exercise, diet and psychosocial issues.  Identifiable causes of hypertension include a thyroid problem.   Anxiety   Presents for initial visit. Onset was 1 to 6 months ago. The problem has been gradually worsening. Symptoms include decreased concentration, depressed mood, excessive worry, insomnia, irritability, nervous/anxious behavior and panic. Patient reports no chest pain, confusion, dizziness, nausea, palpitations, shortness of breath or suicidal ideas. Symptoms occur constantly. The severity of symptoms is severe. The symptoms are aggravated by family issues and work stress. The quality of sleep is fair. Nighttime awakenings: several.     Her past medical history is significant for anxiety/panic attacks and depression. Past treatments include nothing.   Constipation   This is a recurrent problem. The current episode started more than 1 month ago. The problem is unchanged. Her stool frequency is 2 to 3 times per week. The stool is described as malodorous. The patient is not on a high fiber diet. She does not exercise regularly. There has not been  adequate water intake. Associated symptoms include bloating and flatus. Pertinent negatives include no abdominal pain, anorexia, diarrhea, difficulty urinating, fecal incontinence, fever, melena, nausea, vomiting or weight loss. Risk factors include obesity and stress. She has tried laxatives for the symptoms. The treatment provided mild relief. Her past medical history is significant for abdominal surgery ([facility] in 7/19).       Family History   Problem Relation Age of Onset    Cancer Mother     Hypertension Mother     Diabetes Mother     Arthritis Mother     Heart disease Mother     Kidney disease Mother     Cancer Father       Social History     Socioeconomic History    Marital status: Single     Spouse name: Not on file    Number of children: Not on file    Years of education: Not on file    Highest education level: Not on file   Occupational History    Not on file   Social Needs    Financial resource strain: Not on file    Food insecurity:     Worry: Not on file     Inability: Not on file    Transportation needs:     Medical: Not on file     Non-medical: Not on file   Tobacco Use    Smoking status: Never Smoker    Smokeless tobacco: Never Used   Substance and Sexual Activity    Alcohol use: No    Drug use: No    Sexual activity: Never   Lifestyle    Physical activity:     Days per week: Not on file     Minutes per session: Not on file    Stress: Not at all   Relationships    Social connections:     Talks on phone: Not on file     Gets together: Not on file     Attends Congregational service: Not on file     Active member of club or organization: Not on file     Attends meetings of clubs or organizations: Not on file     Relationship status: Not on file   Other Topics Concern    Not on file   Social History Narrative    Not on file     Current Outpatient Medications   Medication Sig Dispense Refill    atorvastatin (LIPITOR) 10 MG tablet Take 1 tablet (10 mg total) by mouth once daily. 90 tablet  "1    blood sugar diagnostic (TRUE METRIX GLUCOSE TEST STRIP MISC) True Metrix Glucose Test Strip   Use twice daily as directed      ibuprofen (ADVIL,MOTRIN) 600 MG tablet Take 1 tablet (600 mg total) by mouth every 6 (six) hours as needed for Pain. 20 tablet 2    levothyroxine (TIROSINT) 50 mcg Cap Take 50 mcg by mouth before breakfast. 30 capsule 2    lisinopril (PRINIVIL,ZESTRIL) 40 MG tablet Take 1 tablet (40 mg total) by mouth once daily. 30 tablet 2    pen needle, diabetic (BD ULTRA-FINE SHORT PEN NEEDLE) 31 gauge x 5/16" Ndle BD Ultra-Fine Short Pen Needle 31 gauge x 5/16"   Use daily with toujeo      docusate sodium (COLACE) 100 MG capsule Take 1 capsule (100 mg total) by mouth 2 (two) times daily as needed for Constipation. 60 capsule 2    metFORMIN (GLUCOPHAGE-XR) 500 MG 24 hr tablet Take 2 tablets (1,000 mg total) by mouth daily with breakfast. 60 tablet 2    semaglutide (OZEMPIC) 0.25 mg or 0.5 mg(2 mg/1.5 mL) PnIj Inject 0.25 mg into the skin every 7 days. 1 Syringe 0    sertraline (ZOLOFT) 50 MG tablet Take 1/2 tablet at bedtime x 7 days, then increase 1 tablet by mouth at bedtime daily 30 tablet 2     No current facility-administered medications for this visit.      Facility-Administered Medications Ordered in Other Visits   Medication Dose Route Frequency Provider Last Rate Last Dose    lidocaine (PF) 10 mg/ml (1%) injection 2 mg  0.2 mL Intradermal Once Brenda Hauser MD         Review of patient's allergies indicates:  No Known Allergies   Past Medical History:   Diagnosis Date    Abdominal pain     RIGHT MID ABD    Anemia     Anxiety     Arthritis     Complex cyst of right ovary 6/17/2019    Diabetes mellitus     Diabetes mellitus, type 2     Encounter for blood transfusion     2    Encounter for well woman exam with routine gynecological exam 5/13/2019    Hypertension     Menorrhagia with regular cycle 6/17/2019    Obesity     Ovarian cyst     right    Problem involving " surgical incision 2019    Sinus congestion     Thyroid disease      Past Surgical History:   Procedure Laterality Date     SECTION      LAPAROSCOPIC SALPINGO-OOPHORECTOMY Bilateral 7/3/2019    Procedure: SALPINGO-OOPHORECTOMY, LAPAROSCOPIC;  Surgeon: Darin Hale MD;  Location: [facility];  Service: OB/GYN;  Laterality: Bilateral;    LAPAROSCOPIC TOTAL HYSTERECTOMY N/A 7/3/2019    Procedure: HYSTERECTOMY, TOTAL, LAPAROSCOPIC;  Surgeon: Darin Hale MD;  Location: [facility] OR;  Service: OB/GYN;  Laterality: N/A;    OPEN REDUCTION AND INTERNAL FIXATION (ORIF) OF INJURY OF ANKLE Left     THYROIDECTOMY, PARTIAL      TUBAL LIGATION         Review of Systems   Constitutional: Positive for fatigue and irritability. Negative for chills, diaphoresis, fever, unexpected weight change and weight loss.   HENT: Negative for congestion, sore throat and trouble swallowing.    Eyes: Negative for blurred vision, pain and visual disturbance.   Respiratory: Negative for cough, chest tightness and shortness of breath.    Cardiovascular: Negative for chest pain, palpitations and leg swelling.   Gastrointestinal: Positive for bloating, constipation and flatus. Negative for abdominal distention, abdominal pain, anorexia, blood in stool, diarrhea, melena, nausea and vomiting.   Endocrine: Positive for heat intolerance (having hot flashes since hysterectomy ). Negative for polydipsia and polyuria.   Genitourinary: Negative for difficulty urinating, dysuria, frequency, hematuria and vaginal discharge.   Musculoskeletal: Negative for myalgias.   Skin: Negative for rash and wound.   Neurological: Positive for numbness (intermittent in feet ) and headaches. Negative for dizziness and weakness.   Hematological: Negative for adenopathy. Bruises/bleeds easily.   Psychiatric/Behavioral: Positive for decreased concentration and dysphoric mood. Negative for confusion, hallucinations, self-injury, sleep disturbance and suicidal  "ideas. The patient is nervous/anxious and has insomnia.       OBJECTIVE:      Vitals:    12/19/19 0837   BP: (!) 132/94   Pulse: 86   SpO2: 98%   Weight: 106.6 kg (235 lb)   Height: 5' 5" (1.651 m)     Physical Exam   Constitutional: She is oriented to person, place, and time. She appears well-developed and well-nourished. No distress.   HENT:   Head: Normocephalic and atraumatic.   Mouth/Throat: Oropharynx is clear and moist.   Eyes: Pupils are equal, round, and reactive to light.   Neck: Normal range of motion. Neck supple.   Cardiovascular: Normal rate, regular rhythm and normal heart sounds.   No murmur heard.  Pulmonary/Chest: Effort normal and breath sounds normal. She has no wheezes. She has no rales.   Abdominal: Soft. Bowel sounds are normal. She exhibits no distension. There is no tenderness.   Musculoskeletal: She exhibits no edema.   Lymphadenopathy:     She has no cervical adenopathy.   Neurological: She is alert and oriented to person, place, and time.   Skin: Skin is warm and dry. No pallor.   Psychiatric: Her speech is normal and behavior is normal. Judgment and thought content normal. Her mood appears anxious. Cognition and memory are normal. She exhibits a depressed mood.   Nursing note and vitals reviewed.     Assessment:       1. Essential hypertension    2. Type 2 diabetes mellitus without complication, with long-term current use of insulin    3. Acquired hypothyroidism    4. Mixed anxiety and depressive disorder    5. Anemia, unspecified type    6. Constipation, unspecified constipation type        Plan:       Essential hypertension  -     lisinopril (PRINIVIL,ZESTRIL) 40 MG tablet; Take 1 tablet (40 mg total) by mouth once daily.  Dispense: 30 tablet; Refill: 2    *uncontrolled- will increase to 40 mg daily   *Lifestyle changes: Reduce the amount of salt in your diet; Lose weight; Avoid drinking too much alcohol; Exercise at least 30 minutes per day most days of the week.  Continue current " medications and home BP monitoring.     Type 2 diabetes mellitus without complication, with long-term current use of insulin  -     Hemoglobin A1c; Future; Expected date: 12/19/2019  -     Comprehensive metabolic panel; Future; Expected date: 12/19/2019  -     Lipid panel; Future; Expected date: 12/19/2019  -     atorvastatin (LIPITOR) 10 MG tablet; Take 1 tablet (10 mg total) by mouth once daily.  Dispense: 90 tablet; Refill: 1  -     metFORMIN (GLUCOPHAGE-XR) 500 MG 24 hr tablet; Take 2 tablets (1,000 mg total) by mouth daily with breakfast.  Dispense: 60 tablet; Refill: 2  -     Urinalysis; Future; Expected date: 12/19/2019  -     Microalbumin/creatinine urine ratio; Future; Expected date: 12/19/2019  -     semaglutide (OZEMPIC) 0.25 mg or 0.5 mg(2 mg/1.5 mL) PnIj; Inject 0.25 mg into the skin every 7 days.  Dispense: 1 Syringe; Refill: 0    *will start trial of ozempic- dose at 0.25 mg x 4 weeks; she denies any personal or family history of thyroid cancer, retinopathy or pancreatitis; mild DM retinopathy noted on recent eye exam in 1/19- will get recheck next month; Side effects of new medication discussed with patient; understanding voiced.  *changed to metformin XR d/t gas/bloating SE   *Risks of DM discussed. Types of treatment discussed. Encouraged exercise and diet. A1C Goal of < 7.0 reviewed. Stay away from sweets and high carb foods such as pasta, rice, bread, etc.  Will adjust/continue medication to achieve optimal glucose and cholesterol control.  Discussed need for annual eye exam, seasonal  flu vaccine seasonally, and routine inspection of feet.  Bring glucose diary to each visit.    Acquired hypothyroidism  -     TSH; Future; Expected date: 12/19/2019  -     levothyroxine (TIROSINT) 50 mcg Cap; Take 50 mcg by mouth before breakfast.  Dispense: 30 capsule; Refill: 2    *discrepancy about dose- she needs to bring her meds/bottles to next visit     Mixed anxiety and depressive disorder  -     CBC auto  differential; Future; Expected date: 12/19/2019  -     Comprehensive metabolic panel; Future; Expected date: 12/19/2019  -     sertraline (ZOLOFT) 50 MG tablet; Take 1/2 tablet at bedtime x 7 days, then increase 1 tablet by mouth at bedtime daily  Dispense: 30 tablet; Refill: 2    *Patient completed the PHQ-9 questionnaire with pos score . Patient will be started on daily antidepressant and was informed that symptom relief may take 2-4 weeks. Patient was encouraged to avoid abrupt cessation of medication and was educated on the potential medication side effects. Exercise was encouraged as an adjunctive treatment. Will follow-up 4 weeks.  Advice pt to go to ER if having SI or HI.     Anemia, unspecified type  -     CBC auto differential; Future; Expected date: 12/19/2019  -     Ferritin; Future; Expected date: 12/19/2019  -     Iron and TIBC; Future; Expected date: 12/19/2019    *recheck labs     Constipation, unspecified constipation type  -     docusate sodium (COLACE) 100 MG capsule; Take 1 capsule (100 mg total) by mouth 2 (two) times daily as needed for Constipation.  Dispense: 60 capsule; Refill: 2   *diet is terrible, discussed measures to control and stool softners BID for now      Follow up in about 1 month (around 1/19/2020) for diabetes, HTN.      12/19/2019 DIANA Gamez, ALEJANDROP

## 2019-12-21 LAB
ALBUMIN SERPL-MCNC: 4.1 G/DL (ref 3.6–5.1)
ALBUMIN/CREAT UR: 49 MCG/MG CREAT
ALBUMIN/GLOB SERPL: 1.2 (CALC) (ref 1–2.5)
ALP SERPL-CCNC: 100 U/L (ref 33–115)
ALT SERPL-CCNC: 8 U/L (ref 6–29)
APPEARANCE UR: CLEAR
AST SERPL-CCNC: 8 U/L (ref 10–35)
BASOPHILS # BLD AUTO: 58 CELLS/UL (ref 0–200)
BASOPHILS NFR BLD AUTO: 0.9 %
BILIRUB SERPL-MCNC: 0.4 MG/DL (ref 0.2–1.2)
BILIRUB UR QL STRIP: NEGATIVE
BUN SERPL-MCNC: 13 MG/DL (ref 7–25)
BUN/CREAT SERPL: ABNORMAL (CALC) (ref 6–22)
CALCIUM SERPL-MCNC: 8.8 MG/DL (ref 8.6–10.2)
CHLORIDE SERPL-SCNC: 97 MMOL/L (ref 98–110)
CHOLEST SERPL-MCNC: 171 MG/DL
CHOLEST/HDLC SERPL: 4 (CALC)
CO2 SERPL-SCNC: 27 MMOL/L (ref 20–32)
COLOR UR: YELLOW
CREAT SERPL-MCNC: 0.66 MG/DL (ref 0.5–1.1)
CREAT UR-MCNC: 173 MG/DL (ref 20–275)
EOSINOPHIL # BLD AUTO: 179 CELLS/UL (ref 15–500)
EOSINOPHIL NFR BLD AUTO: 2.8 %
ERYTHROCYTE [DISTWIDTH] IN BLOOD BY AUTOMATED COUNT: 18.2 % (ref 11–15)
FERRITIN SERPL-MCNC: 4 NG/ML (ref 16–232)
GFRSERPLBLD MDRD-ARVRAT: 106 ML/MIN/1.73M2
GLOBULIN SER CALC-MCNC: 3.3 G/DL (CALC) (ref 1.9–3.7)
GLUCOSE SERPL-MCNC: 325 MG/DL (ref 65–99)
GLUCOSE UR QL STRIP: ABNORMAL
HBA1C MFR BLD: 12.4 % OF TOTAL HGB
HCT VFR BLD AUTO: 33.4 % (ref 35–45)
HDLC SERPL-MCNC: 43 MG/DL
HGB BLD-MCNC: 9 G/DL (ref 11.7–15.5)
HGB UR QL STRIP: NEGATIVE
IRON SATN MFR SERPL: 6 % (CALC) (ref 16–45)
IRON SERPL-MCNC: 23 MCG/DL (ref 40–190)
KETONES UR QL STRIP: ABNORMAL
LDLC SERPL CALC-MCNC: 106 MG/DL (CALC)
LEUKOCYTE ESTERASE UR QL STRIP: NEGATIVE
LYMPHOCYTES # BLD AUTO: 1798 CELLS/UL (ref 850–3900)
LYMPHOCYTES NFR BLD AUTO: 28.1 %
MCH RBC QN AUTO: 18.6 PG (ref 27–33)
MCHC RBC AUTO-ENTMCNC: 26.9 G/DL (ref 32–36)
MCV RBC AUTO: 69.2 FL (ref 80–100)
MICROALBUMIN UR-MCNC: 8.5 MG/DL
MONOCYTES # BLD AUTO: 333 CELLS/UL (ref 200–950)
MONOCYTES NFR BLD AUTO: 5.2 %
NEUTROPHILS # BLD AUTO: 4032 CELLS/UL (ref 1500–7800)
NEUTROPHILS NFR BLD AUTO: 63 %
NITRITE UR QL STRIP: NEGATIVE
NONHDLC SERPL-MCNC: 128 MG/DL (CALC)
PH UR STRIP: 5.5 [PH] (ref 5–8)
PLATELET # BLD AUTO: 247 THOUSAND/UL (ref 140–400)
PMV BLD REES-ECKER: ABNORMAL FL (ref 7.5–12.5)
POTASSIUM SERPL-SCNC: 3.9 MMOL/L (ref 3.5–5.3)
PROT SERPL-MCNC: 7.4 G/DL (ref 6.1–8.1)
PROT UR QL STRIP: ABNORMAL
RBC # BLD AUTO: 4.83 MILLION/UL (ref 3.8–5.1)
SERVICE CMNT-IMP: ABNORMAL
SODIUM SERPL-SCNC: 135 MMOL/L (ref 135–146)
SP GR UR STRIP: 1.02 (ref 1–1.03)
TIBC SERPL-MCNC: 392 MCG/DL (CALC) (ref 250–450)
TRIGL SERPL-MCNC: 123 MG/DL
TSH SERPL-ACNC: 17.07 MIU/L
WBC # BLD AUTO: 6.4 THOUSAND/UL (ref 3.8–10.8)

## 2019-12-23 ENCOUNTER — TELEPHONE (OUTPATIENT)
Dept: FAMILY MEDICINE | Facility: CLINIC | Age: 44
End: 2019-12-23

## 2019-12-23 NOTE — PROGRESS NOTES
Please notify patient that her results are abnormal- diabetes much worse; A1C up to 12; follow up as planned for further discussion

## 2019-12-23 NOTE — TELEPHONE ENCOUNTER
Pt notified of results    ----- Message from MICHAEL Celestin sent at 12/23/2019  8:59 AM CST -----  Please notify patient that her results are abnormal- diabetes much worse; A1C up to 12; follow up as planned for further discussion

## 2019-12-30 ENCOUNTER — OFFICE VISIT (OUTPATIENT)
Dept: FAMILY MEDICINE | Facility: CLINIC | Age: 44
End: 2019-12-30
Payer: COMMERCIAL

## 2019-12-30 VITALS
DIASTOLIC BLOOD PRESSURE: 80 MMHG | BODY MASS INDEX: 39.43 KG/M2 | HEIGHT: 65 IN | HEART RATE: 84 BPM | OXYGEN SATURATION: 97 % | WEIGHT: 236.69 LBS | TEMPERATURE: 98 F | SYSTOLIC BLOOD PRESSURE: 120 MMHG

## 2019-12-30 DIAGNOSIS — I10 ESSENTIAL HYPERTENSION: ICD-10-CM

## 2019-12-30 DIAGNOSIS — E11.49 OTHER DIABETIC NEUROLOGICAL COMPLICATION ASSOCIATED WITH TYPE 2 DIABETES MELLITUS: ICD-10-CM

## 2019-12-30 DIAGNOSIS — E11.9 TYPE 2 DIABETES MELLITUS WITHOUT COMPLICATION, WITH LONG-TERM CURRENT USE OF INSULIN: Primary | ICD-10-CM

## 2019-12-30 DIAGNOSIS — Z79.4 TYPE 2 DIABETES MELLITUS WITHOUT COMPLICATION, WITH LONG-TERM CURRENT USE OF INSULIN: Primary | ICD-10-CM

## 2019-12-30 DIAGNOSIS — D50.9 IRON DEFICIENCY ANEMIA, UNSPECIFIED IRON DEFICIENCY ANEMIA TYPE: ICD-10-CM

## 2019-12-30 DIAGNOSIS — B37.2 YEAST INFECTION OF THE SKIN: ICD-10-CM

## 2019-12-30 PROCEDURE — 99213 OFFICE O/P EST LOW 20 MIN: CPT | Mod: S$GLB,,, | Performed by: NURSE PRACTITIONER

## 2019-12-30 PROCEDURE — 3046F HEMOGLOBIN A1C LEVEL >9.0%: CPT | Mod: S$GLB,,, | Performed by: NURSE PRACTITIONER

## 2019-12-30 PROCEDURE — 3079F PR MOST RECENT DIASTOLIC BLOOD PRESSURE 80-89 MM HG: ICD-10-PCS | Mod: S$GLB,,, | Performed by: NURSE PRACTITIONER

## 2019-12-30 PROCEDURE — 3074F SYST BP LT 130 MM HG: CPT | Mod: S$GLB,,, | Performed by: NURSE PRACTITIONER

## 2019-12-30 PROCEDURE — 3046F PR MOST RECENT HEMOGLOBIN A1C LEVEL > 9.0%: ICD-10-PCS | Mod: S$GLB,,, | Performed by: NURSE PRACTITIONER

## 2019-12-30 PROCEDURE — 3008F PR BODY MASS INDEX (BMI) DOCUMENTED: ICD-10-PCS | Mod: S$GLB,,, | Performed by: NURSE PRACTITIONER

## 2019-12-30 PROCEDURE — 3079F DIAST BP 80-89 MM HG: CPT | Mod: S$GLB,,, | Performed by: NURSE PRACTITIONER

## 2019-12-30 PROCEDURE — 3008F BODY MASS INDEX DOCD: CPT | Mod: S$GLB,,, | Performed by: NURSE PRACTITIONER

## 2019-12-30 PROCEDURE — 99213 PR OFFICE/OUTPT VISIT, EST, LEVL III, 20-29 MIN: ICD-10-PCS | Mod: S$GLB,,, | Performed by: NURSE PRACTITIONER

## 2019-12-30 PROCEDURE — 3074F PR MOST RECENT SYSTOLIC BLOOD PRESSURE < 130 MM HG: ICD-10-PCS | Mod: S$GLB,,, | Performed by: NURSE PRACTITIONER

## 2019-12-30 RX ORDER — NYSTATIN 100000 U/G
OINTMENT TOPICAL 2 TIMES DAILY
Qty: 15 G | Refills: 0 | Status: SHIPPED | OUTPATIENT
Start: 2019-12-30 | End: 2020-03-24

## 2019-12-30 RX ORDER — DOCUSATE SODIUM 100 MG
100 CAPSULE ORAL
COMMUNITY
Start: 2019-12-19 | End: 2019-12-30

## 2019-12-30 RX ORDER — GABAPENTIN 300 MG/1
300 CAPSULE ORAL DAILY
Qty: 90 CAPSULE | Refills: 1 | Status: SHIPPED | OUTPATIENT
Start: 2019-12-30 | End: 2020-03-10

## 2019-12-30 RX ORDER — FERROUS SULFATE 325(65) MG
325 TABLET ORAL
Qty: 90 TABLET | Refills: 1 | Status: SHIPPED | OUTPATIENT
Start: 2019-12-30 | End: 2020-02-10 | Stop reason: SDUPTHER

## 2019-12-30 RX ORDER — GABAPENTIN 400 MG/1
CAPSULE ORAL
COMMUNITY
End: 2019-12-30 | Stop reason: DRUGHIGH

## 2019-12-30 RX ORDER — AMITRIPTYLINE HYDROCHLORIDE 25 MG/1
TABLET, FILM COATED ORAL
COMMUNITY
End: 2020-02-10
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
## 2020-01-30 DIAGNOSIS — E03.9 ACQUIRED HYPOTHYROIDISM: Primary | ICD-10-CM

## 2020-01-31 RX ORDER — LEVOTHYROXINE SODIUM 300 UG/1
TABLET ORAL
Qty: 90 TABLET | Refills: 0 | OUTPATIENT
Start: 2020-01-31 | End: 2020-03-01

## 2020-01-31 NOTE — TELEPHONE ENCOUNTER
Spoke to patient to inform her that her refill request was rejected due to her not knowing the dosage. Patient states that she taking 300 mcg and not 50 mcg. Patient was instructed to have her labs done at [facility]. Patient also inquired about some insulin that was given to her in a sample during her last visit with another provider ( she was unable to recall the name). Patient was given an appointment for Monday to review her labs and discuss insulin.

## 2020-01-31 NOTE — TELEPHONE ENCOUNTER
Pt was supposed to call with appropriate dosage of Synthroid. She saw Shirley initially and was restarted on 50mcg and levels were ordered because she did not know what dosage of Synthroid she was on. Then she saw me, still did not bring in her medication bottles, nor did she know what dosage she was taking. Not sure where the 300mcg dosage she is requesting is coming from. Please ensure she has been taking the 50mcg Synthroid prescribed by Shirley when she initially came here. Should be taking in the morning on an empty stomach. She needs to have her labs completed again to determine what her TSH and free T4 are. Will send to [facility] and she should have completed today or the next couple days.

## 2020-01-31 NOTE — TELEPHONE ENCOUNTER
Last office visit 12/30/19  Follow up appointment on 03/31/20    Component      Latest Ref Rng & Units 12/20/2019   TSH      mIU/L 17.07 (H)

## 2020-02-05 LAB
BASOPHILS # BLD AUTO: 58 CELLS/UL (ref 0–200)
BASOPHILS NFR BLD AUTO: 0.9 %
EOSINOPHIL # BLD AUTO: 122 CELLS/UL (ref 15–500)
EOSINOPHIL NFR BLD AUTO: 1.9 %
ERYTHROCYTE [DISTWIDTH] IN BLOOD BY AUTOMATED COUNT: 17.9 % (ref 11–15)
FERRITIN SERPL-MCNC: 5 NG/ML (ref 16–232)
HCT VFR BLD AUTO: 30.1 % (ref 35–45)
HGB BLD-MCNC: 8.5 G/DL (ref 11.7–15.5)
IRON SATN MFR SERPL: 7 % (CALC) (ref 16–45)
IRON SERPL-MCNC: 27 MCG/DL (ref 40–190)
LYMPHOCYTES # BLD AUTO: 1734 CELLS/UL (ref 850–3900)
LYMPHOCYTES NFR BLD AUTO: 27.1 %
MCH RBC QN AUTO: 19.2 PG (ref 27–33)
MCHC RBC AUTO-ENTMCNC: 28.2 G/DL (ref 32–36)
MCV RBC AUTO: 67.9 FL (ref 80–100)
MONOCYTES # BLD AUTO: 352 CELLS/UL (ref 200–950)
MONOCYTES NFR BLD AUTO: 5.5 %
NEUTROPHILS # BLD AUTO: 4134 CELLS/UL (ref 1500–7800)
NEUTROPHILS NFR BLD AUTO: 64.6 %
PLATELET # BLD AUTO: 276 THOUSAND/UL (ref 140–400)
PMV BLD REES-ECKER: ABNORMAL FL (ref 7.5–12.5)
RBC # BLD AUTO: 4.43 MILLION/UL (ref 3.8–5.1)
SERVICE CMNT-IMP: ABNORMAL
T4 FREE SERPL-MCNC: 1.7 NG/DL (ref 0.8–1.8)
TIBC SERPL-MCNC: 365 MCG/DL (CALC) (ref 250–450)
TSH SERPL-ACNC: 0.25 MIU/L
WBC # BLD AUTO: 6.4 THOUSAND/UL (ref 3.8–10.8)

## 2020-02-05 NOTE — TELEPHONE ENCOUNTER
Please call patient. She needs to make an appointment this week to discuss lab work. She MUST bring her medications with her so we can review them together.

## 2020-02-06 ENCOUNTER — TELEPHONE (OUTPATIENT)
Dept: FAMILY MEDICINE | Facility: CLINIC | Age: 45
End: 2020-02-06

## 2020-02-06 NOTE — TELEPHONE ENCOUNTER
----- Message from MICHAEL Ley sent at 2/5/2020  5:07 PM CST -----  Please call patient. She needs to make an appointment this week to discuss lab work. She MUST bring her medications with her so we can review them together.

## 2020-02-07 NOTE — TELEPHONE ENCOUNTER
Spoke to patient and informed her that an appointment was needed to discuss labs and medications. Patient verbally agreed and appointment was set for Monday 02/10/2020 at 8:30am. Patient was also reminded to bring medications with her to the appointment .

## 2020-02-10 ENCOUNTER — TELEPHONE (OUTPATIENT)
Dept: FAMILY MEDICINE | Facility: CLINIC | Age: 45
End: 2020-02-10

## 2020-02-10 ENCOUNTER — OFFICE VISIT (OUTPATIENT)
Dept: FAMILY MEDICINE | Facility: CLINIC | Age: 45
End: 2020-02-10
Payer: COMMERCIAL

## 2020-02-10 VITALS
SYSTOLIC BLOOD PRESSURE: 130 MMHG | HEIGHT: 66 IN | RESPIRATION RATE: 20 BRPM | TEMPERATURE: 97 F | WEIGHT: 233.81 LBS | OXYGEN SATURATION: 98 % | HEART RATE: 85 BPM | DIASTOLIC BLOOD PRESSURE: 88 MMHG | BODY MASS INDEX: 37.57 KG/M2

## 2020-02-10 DIAGNOSIS — E11.9 TYPE 2 DIABETES MELLITUS WITHOUT COMPLICATION, WITH LONG-TERM CURRENT USE OF INSULIN: ICD-10-CM

## 2020-02-10 DIAGNOSIS — I10 ESSENTIAL HYPERTENSION: ICD-10-CM

## 2020-02-10 DIAGNOSIS — G44.209 TENSION HEADACHE: ICD-10-CM

## 2020-02-10 DIAGNOSIS — E78.5 DYSLIPIDEMIA: ICD-10-CM

## 2020-02-10 DIAGNOSIS — D50.9 IRON DEFICIENCY ANEMIA, UNSPECIFIED IRON DEFICIENCY ANEMIA TYPE: ICD-10-CM

## 2020-02-10 DIAGNOSIS — F41.8 MIXED ANXIETY AND DEPRESSIVE DISORDER: ICD-10-CM

## 2020-02-10 DIAGNOSIS — E03.9 ACQUIRED HYPOTHYROIDISM: Primary | ICD-10-CM

## 2020-02-10 DIAGNOSIS — Z79.4 TYPE 2 DIABETES MELLITUS WITHOUT COMPLICATION, WITH LONG-TERM CURRENT USE OF INSULIN: ICD-10-CM

## 2020-02-10 DIAGNOSIS — K59.00 CONSTIPATION, UNSPECIFIED CONSTIPATION TYPE: ICD-10-CM

## 2020-02-10 PROCEDURE — 3079F PR MOST RECENT DIASTOLIC BLOOD PRESSURE 80-89 MM HG: ICD-10-PCS | Mod: S$GLB,,, | Performed by: NURSE PRACTITIONER

## 2020-02-10 PROCEDURE — 3046F PR MOST RECENT HEMOGLOBIN A1C LEVEL > 9.0%: ICD-10-PCS | Mod: S$GLB,,, | Performed by: NURSE PRACTITIONER

## 2020-02-10 PROCEDURE — 99213 PR OFFICE/OUTPT VISIT, EST, LEVL III, 20-29 MIN: ICD-10-PCS | Mod: S$GLB,,, | Performed by: NURSE PRACTITIONER

## 2020-02-10 PROCEDURE — 99213 OFFICE O/P EST LOW 20 MIN: CPT | Mod: S$GLB,,, | Performed by: NURSE PRACTITIONER

## 2020-02-10 PROCEDURE — 3079F DIAST BP 80-89 MM HG: CPT | Mod: S$GLB,,, | Performed by: NURSE PRACTITIONER

## 2020-02-10 PROCEDURE — 3008F PR BODY MASS INDEX (BMI) DOCUMENTED: ICD-10-PCS | Mod: S$GLB,,, | Performed by: NURSE PRACTITIONER

## 2020-02-10 PROCEDURE — 3046F HEMOGLOBIN A1C LEVEL >9.0%: CPT | Mod: S$GLB,,, | Performed by: NURSE PRACTITIONER

## 2020-02-10 PROCEDURE — 3008F BODY MASS INDEX DOCD: CPT | Mod: S$GLB,,, | Performed by: NURSE PRACTITIONER

## 2020-02-10 PROCEDURE — 3075F SYST BP GE 130 - 139MM HG: CPT | Mod: S$GLB,,, | Performed by: NURSE PRACTITIONER

## 2020-02-10 PROCEDURE — 3075F PR MOST RECENT SYSTOLIC BLOOD PRESS GE 130-139MM HG: ICD-10-PCS | Mod: S$GLB,,, | Performed by: NURSE PRACTITIONER

## 2020-02-10 RX ORDER — LISINOPRIL 40 MG/1
40 TABLET ORAL DAILY
Qty: 90 TABLET | Refills: 1 | Status: SHIPPED | OUTPATIENT
Start: 2020-02-10 | End: 2020-03-31

## 2020-02-10 RX ORDER — SERTRALINE HYDROCHLORIDE 50 MG/1
50 TABLET, FILM COATED ORAL DAILY
Qty: 30 TABLET | Refills: 1 | Status: SHIPPED | OUTPATIENT
Start: 2020-02-10 | End: 2020-06-16

## 2020-02-10 RX ORDER — ATORVASTATIN CALCIUM 10 MG/1
10 TABLET, FILM COATED ORAL DAILY
Qty: 90 TABLET | Refills: 1 | Status: SHIPPED | OUTPATIENT
Start: 2020-02-10 | End: 2020-09-23 | Stop reason: SDUPTHER

## 2020-02-10 RX ORDER — LEVOTHYROXINE SODIUM 300 UG/1
TABLET ORAL
Refills: 0 | OUTPATIENT
Start: 2020-02-10 | End: 2020-03-11

## 2020-02-10 RX ORDER — FERROUS SULFATE 325(65) MG
325 TABLET ORAL
Qty: 90 TABLET | Refills: 1
Start: 2020-02-10 | End: 2020-06-16 | Stop reason: SINTOL

## 2020-02-10 RX ORDER — NAPROXEN 500 MG/1
500 TABLET ORAL 2 TIMES DAILY PRN
Qty: 30 TABLET | Refills: 1 | Status: SHIPPED | OUTPATIENT
Start: 2020-02-10 | End: 2020-06-16

## 2020-02-10 RX ORDER — DOCUSATE SODIUM 100 MG/1
100 CAPSULE, LIQUID FILLED ORAL DAILY
Qty: 30 CAPSULE | Refills: 3 | Status: SHIPPED | OUTPATIENT
Start: 2020-02-10 | End: 2020-06-16

## 2020-02-10 RX ORDER — LEVOTHYROXINE SODIUM 300 UG/1
0.3 TABLET ORAL
Qty: 30 TABLET | Refills: 4 | Status: SHIPPED | OUTPATIENT
Start: 2020-02-10 | End: 2020-03-20 | Stop reason: DRUGHIGH

## 2020-02-10 RX ORDER — PEN NEEDLE, DIABETIC 30 GX3/16"
1 NEEDLE, DISPOSABLE MISCELLANEOUS WEEKLY
Qty: 30 EACH | Refills: 3 | Status: SHIPPED | OUTPATIENT
Start: 2020-02-10 | End: 2020-04-08

## 2020-02-10 RX ORDER — METFORMIN HYDROCHLORIDE 500 MG/1
1000 TABLET, EXTENDED RELEASE ORAL
Qty: 60 TABLET | Refills: 3 | Status: SHIPPED | OUTPATIENT
Start: 2020-02-10 | End: 2020-06-16 | Stop reason: SDUPTHER

## 2020-02-10 NOTE — TELEPHONE ENCOUNTER
.The following medication needs a prior authorization:     Medication Name: Ozempic     Dosage: 2mg/ 1.5ml 0.25/.5 pen     Frequency: weekly     Directions for use: Inject 0.25mg into skin once a week     Diagnosis: Type 2 Diabetic Mellitus with Hyperglycemia with long term current use of insulin     Is the request for a reauthorization? no    Is the patient currently stable on therapy? Yes ( samples were provided )    Please list all therapeutic alternatives previously used with start/end dates and outcome: Metformin 1,000 mg, ( currently) Insulin Detemir ( 7-3-2019 not effective)

## 2020-02-10 NOTE — PROGRESS NOTES
SUBJECTIVE:      Patient ID: Haydee Allen is a 44 y.o. female.    Chief Complaint: Follow-up (review labs and medications)    Pt presented for lab review and F/U to her medication refill request. She was asked multiple times to bring in her medications for review of what she had been taking on a daily basis. Her medications were brought with her today. She reports compliance with all her medications. The necessity of compliance with appointments, medications, and treatment plans was discussed and she verbalized understanding. She is still reporting headaches at this time. Reports she was previously placed on Zoloft 50mg for her trouble sleeping, anxiety, and headaches, but it isn't helping. Reports the headaches are band-like and throbbing/aching in nature and radiate to the neck and shoulders. She has tried OTC ibuprofen and heat at home with some mild relief. Denies vision changes, photophobia, phonophobia, nausea, vomiting, seizures, numbness, or weakness. She reports compliance with her diabetes medication and reports intermittent exercise, most recently three times last week which she reports she walked around her neighborhood. Reports she drinks water but also endorses a lot of tea. She also reports compliance with her thyroid medication (recently reporting she takes 350mcg daily) but she takes in the morning with breakfast. She reports taking iron as directed in the previous visit but reports some constipation with it and has been using Colace for this. Denies CP, SOB, wheezing, fevers, nausea, vomiting, diarrhea, numbness, weakness, dizziness, palpitations, or any other concerns at this time.      Past Surgical History:   Procedure Laterality Date     SECTION      LAPAROSCOPIC SALPINGO-OOPHORECTOMY Bilateral 7/3/2019    Procedure: SALPINGO-OOPHORECTOMY, LAPAROSCOPIC;  Surgeon: Darin Hale MD;  Location: [facility];  Service: OB/GYN;  Laterality: Bilateral;    LAPAROSCOPIC TOTAL  HYSTERECTOMY N/A 7/3/2019    Procedure: HYSTERECTOMY, TOTAL, LAPAROSCOPIC;  Surgeon: Darin Hale MD;  Location: [facility];  Service: OB/GYN;  Laterality: N/A;    OPEN REDUCTION AND INTERNAL FIXATION (ORIF) OF INJURY OF ANKLE Left     THYROIDECTOMY, PARTIAL      TUBAL LIGATION       Family History   Problem Relation Age of Onset    Cancer Mother     Hypertension Mother     Diabetes Mother     Arthritis Mother     Heart disease Mother     Kidney disease Mother     Cancer Father       Social History     Socioeconomic History    Marital status: Single     Spouse name: Not on file    Number of children: Not on file    Years of education: Not on file    Highest education level: Not on file   Occupational History    Not on file   Social Needs    Financial resource strain: Not on file    Food insecurity:     Worry: Not on file     Inability: Not on file    Transportation needs:     Medical: Not on file     Non-medical: Not on file   Tobacco Use    Smoking status: Never Smoker    Smokeless tobacco: Never Used   Substance and Sexual Activity    Alcohol use: No    Drug use: No    Sexual activity: Never   Lifestyle    Physical activity:     Days per week: Not on file     Minutes per session: Not on file    Stress: Not at all   Relationships    Social connections:     Talks on phone: Not on file     Gets together: Not on file     Attends Rastafari service: Not on file     Active member of club or organization: Not on file     Attends meetings of clubs or organizations: Not on file     Relationship status: Not on file   Other Topics Concern    Not on file   Social History Narrative    Not on file     Current Outpatient Medications   Medication Sig Dispense Refill    atorvastatin (LIPITOR) 10 MG tablet Take 1 tablet (10 mg total) by mouth once daily. 90 tablet 1    blood sugar diagnostic (TRUE METRIX GLUCOSE TEST STRIP MISC) True Metrix Glucose Test Strip   Use twice daily as directed       "docusate sodium (COLACE) 100 MG capsule Take 1 capsule (100 mg total) by mouth once daily. 30 capsule 3    gabapentin (NEURONTIN) 300 MG capsule Take 1 capsule (300 mg total) by mouth once daily. 90 capsule 1    lisinopril (PRINIVIL,ZESTRIL) 40 MG tablet Take 1 tablet (40 mg total) by mouth once daily. 90 tablet 1    metFORMIN (GLUCOPHAGE-XR) 500 MG XR 24hr tablet Take 2 tablets (1,000 mg total) by mouth daily with breakfast. 60 tablet 3    nystatin (MYCOSTATIN) ointment Apply topically 2 (two) times daily. 15 g 0    pen needle, diabetic (BD ULTRA-FINE SHORT PEN NEEDLE) 31 gauge x 5/16" Ndle Inject 1 Device into the skin once a week. 30 each 3    sertraline (ZOLOFT) 50 MG tablet Take 1 tablet (50 mg total) by mouth once daily. 30 tablet 1    ferrous sulfate (FEOSOL) 325 mg (65 mg iron) Tab tablet Take 1 tablet (325 mg total) by mouth daily with breakfast. 90 tablet 1    levothyroxine (SYNTHROID) 300 MCG Tab Take 1 tablet (300 mcg total) by mouth before breakfast. 30 tablet 4    naproxen (NAPROSYN) 500 MG tablet Take 1 tablet (500 mg total) by mouth 2 (two) times daily as needed (headache). Take with food. 30 tablet 1    semaglutide (OZEMPIC) 0.25 mg or 0.5 mg(2 mg/1.5 mL) PnIj Inject 0.25 mg into the skin once a week. 3 Syringe 1     No current facility-administered medications for this visit.      Facility-Administered Medications Ordered in Other Visits   Medication Dose Route Frequency Provider Last Rate Last Dose    lidocaine (PF) 10 mg/ml (1%) injection 2 mg  0.2 mL Intradermal Once Brenda Hauser MD         Review of patient's allergies indicates:  No Known Allergies   Past Medical History:   Diagnosis Date    Abdominal pain     RIGHT MID ABD    Anemia     Anxiety     Arthritis     Complex cyst of right ovary 6/17/2019    Diabetes mellitus     Diabetes mellitus, type 2     Encounter for blood transfusion     2    Encounter for well woman exam with routine gynecological exam 5/13/2019    " Hypertension     Menorrhagia with regular cycle 2019    Obesity     Ovarian cyst     right    Problem involving surgical incision 2019    Sinus congestion     Thyroid disease      Past Surgical History:   Procedure Laterality Date     SECTION      LAPAROSCOPIC SALPINGO-OOPHORECTOMY Bilateral 7/3/2019    Procedure: SALPINGO-OOPHORECTOMY, LAPAROSCOPIC;  Surgeon: Darin Hale MD;  Location: [facility] OR;  Service: OB/GYN;  Laterality: Bilateral;    LAPAROSCOPIC TOTAL HYSTERECTOMY N/A 7/3/2019    Procedure: HYSTERECTOMY, TOTAL, LAPAROSCOPIC;  Surgeon: Darin Hale MD;  Location: [facility] OR;  Service: OB/GYN;  Laterality: N/A;    OPEN REDUCTION AND INTERNAL FIXATION (ORIF) OF INJURY OF ANKLE Left     THYROIDECTOMY, PARTIAL      TUBAL LIGATION         Review of Systems   Constitutional: Negative for activity change, appetite change, chills, fatigue, fever and unexpected weight change.   HENT: Negative for congestion, ear pain, postnasal drip, rhinorrhea, sinus pressure, sinus pain, sneezing and sore throat.    Eyes: Negative for pain, discharge and visual disturbance.   Respiratory: Negative for cough, chest tightness, shortness of breath and wheezing.    Cardiovascular: Negative for chest pain, palpitations and leg swelling.   Gastrointestinal: Positive for constipation. Negative for abdominal distention, abdominal pain, blood in stool, diarrhea, nausea and vomiting.   Genitourinary: Negative for difficulty urinating, flank pain, frequency, hematuria, pelvic pain, urgency and vaginal discharge.   Musculoskeletal: Negative for back pain, joint swelling and myalgias.   Skin: Negative for color change, rash and wound.   Neurological: Positive for headaches. Negative for dizziness, seizures, syncope, weakness, light-headedness and numbness.   Hematological: Negative for adenopathy.   Psychiatric/Behavioral: Negative for agitation, confusion, hallucinations, sleep disturbance and suicidal  "ideas. The patient is not nervous/anxious.       OBJECTIVE:      Vitals:    02/10/20 0832 02/10/20 0913   BP: (!) 142/90 130/88   BP Location: Right arm Right arm   Patient Position: Sitting Sitting   BP Method: Large (Manual)    Pulse: 85    Resp: 20    Temp: 97.4 °F (36.3 °C)    TempSrc: Oral    SpO2: 98%    Weight: 106.1 kg (233 lb 12.8 oz)    Height: 5' 6" (1.676 m)      Physical Exam   Constitutional: She is oriented to person, place, and time. Vital signs are normal. She appears well-developed and well-nourished. No distress.   HENT:   Head: Normocephalic and atraumatic.   Right Ear: Hearing and external ear normal.   Left Ear: Hearing and external ear normal.   Nose: Nose normal.   Mouth/Throat: Uvula is midline, oropharynx is clear and moist and mucous membranes are normal. No oropharyngeal exudate.   Eyes: Pupils are equal, round, and reactive to light. Conjunctivae, EOM and lids are normal. Right eye exhibits no discharge. Left eye exhibits no discharge. No scleral icterus.   Neck: Trachea normal, normal range of motion, full passive range of motion without pain and phonation normal. Neck supple. Carotid bruit is not present. No tracheal deviation present. No thyromegaly present.   Cardiovascular: Normal rate, regular rhythm, normal heart sounds, intact distal pulses and normal pulses. Exam reveals no gallop and no friction rub.   No murmur heard.  Pulmonary/Chest: Effort normal and breath sounds normal. No stridor. No respiratory distress. She has no decreased breath sounds. She has no wheezes. She has no rhonchi. She has no rales.   Abdominal: Soft. Normal appearance and bowel sounds are normal. There is no tenderness.   Musculoskeletal: Normal range of motion. She exhibits no edema.   Lymphadenopathy:     She has no cervical adenopathy.        Right: No supraclavicular adenopathy present.        Left: No supraclavicular adenopathy present.   Neurological: She is alert and oriented to person, place, and " "time.   Skin: Skin is warm, dry and intact. Capillary refill takes less than 2 seconds. No rash noted. She is not diaphoretic.   Psychiatric: She has a normal mood and affect. Her speech is normal and behavior is normal. Judgment and thought content normal. Cognition and memory are normal. She expresses no suicidal plans.   Vitals reviewed.     Assessment:       1. Acquired hypothyroidism    2. Type 2 diabetes mellitus without complication, with long-term current use of insulin    3. Essential hypertension    4. Iron deficiency anemia, unspecified iron deficiency anemia type    5. Constipation, unspecified constipation type    6. Tension headache    7. Dyslipidemia    8. Mixed anxiety and depressive disorder        Plan:       Acquired hypothyroidism  Decreasing her dosage of Synthroid to 300mcg daily. Instructed to take in the morning on an empty stomach and then wait 30 minutes prior to eating. Will check TSH/free T4 in 6 weeks. F/U as scheduled on 3/31/2020.  -     levothyroxine (SYNTHROID) 300 MCG Tab; Take 1 tablet (300 mcg total) by mouth before breakfast.  Dispense: 30 tablet; Refill: 4  -     TSH; Future; Expected date: 03/23/2020  -     T4, free; Future; Expected date: 03/23/2020    Type 2 diabetes mellitus without complication, with long-term current use of insulin  Continue metformin BID and Ozempic as previously prescribed. She will get her HgbA1c checked with her thyroid labs in 6 weeks. F/U as scheduled on 3/31/2020 for medication regimen titration.  -     metFORMIN (GLUCOPHAGE-XR) 500 MG XR 24hr tablet; Take 2 tablets (1,000 mg total) by mouth daily with breakfast.  Dispense: 60 tablet; Refill: 3  -     Hemoglobin A1c; Future; Expected date: 03/23/2020  -     semaglutide (OZEMPIC) 0.25 mg or 0.5 mg(2 mg/1.5 mL) PnIj; Inject 0.25 mg into the skin once a week.  Dispense: 3 Syringe; Refill: 1  -     pen needle, diabetic (BD ULTRA-FINE SHORT PEN NEEDLE) 31 gauge x 5/16" Ndle; Inject 1 Device into the skin " once a week.  Dispense: 30 each; Refill: 3    Essential hypertension  Currently blood pressure is controlled on her medication. Discussed the need to watch her salt intake as well as her caffeine intake. Advised to increase her water intake. F/U on 3/31/2020 as scheduled.  -     lisinopril (PRINIVIL,ZESTRIL) 40 MG tablet; Take 1 tablet (40 mg total) by mouth once daily.  Dispense: 90 tablet; Refill: 1    Iron deficiency anemia, unspecified iron deficiency anemia type  Continue medication as prescribed as her labs showed iron deficiency. Will continue therapy and check labs in 3 months.  -     ferrous sulfate (FEOSOL) 325 mg (65 mg iron) Tab tablet; Take 1 tablet (325 mg total) by mouth daily with breakfast.  Dispense: 90 tablet; Refill: 1    Constipation, unspecified constipation type  Instructed on daily use of Colace with the iron supplementation. She is denying blood in the stools at this time.  -     docusate sodium (COLACE) 100 MG capsule; Take 1 capsule (100 mg total) by mouth once daily.  Dispense: 30 capsule; Refill: 3    Tension headache  Discussed use of naproxen for tension headaches as well as heat to the shoulder and neck when these headaches occur. Instructed to decrease caffeine intake and increase water intake. F/U if symptoms worsen or persist.  -     naproxen (NAPROSYN) 500 MG tablet; Take 1 tablet (500 mg total) by mouth 2 (two) times daily as needed (headache). Take with food.  Dispense: 30 tablet; Refill: 1    Dyslipidemia  Continue current therapy at this time as cholesterol is controlled.  -     atorvastatin (LIPITOR) 10 MG tablet; Take 1 tablet (10 mg total) by mouth once daily.  Dispense: 90 tablet; Refill: 1    Mixed anxiety and depressive disorder  Continue therapy at this time. F/U as scheduled on 3/31/2020.  -     sertraline (ZOLOFT) 50 MG tablet; Take 1 tablet (50 mg total) by mouth once daily.  Dispense: 30 tablet; Refill: 1        Follow up in 7 weeks (on 3/31/2020) for F/U DM and  labs as scheduled.      2/10/2020 Emily Timmons, DIANA, FNP

## 2020-02-10 NOTE — PATIENT INSTRUCTIONS
Tension Headache    A muscle tension headache is a very common cause of head pain. Its also called a stress headache. When some people are under stress, they tense the muscles of their shoulder, neck, and scalp without knowing it. If this tension lasts long enough, a headache can occur. A tension headache can be quite painful. It can last for hours or even days.  Home care  Follow these tips when caring for yourself at home:  · Dont drive yourself home if you were given pain medicine for your headache. Instead, have someone else drive you home. Try to sleep when you get home. You should feel much better when you wake up.  · Put heat on the back of your neck to help ease neck spasm.  · Drink only clear liquids or eat a light diet until your symptoms get better. This will help you avoid nausea or vomiting.  How to prevent headaches  · Figure out what is causing stress in your life. Learn new ways to handle your stress. Ideas include regular exercise, biofeedback, self-hypnosis, yoga, and meditation. Talk with your healthcare provider to find out more information about managing stress. Many books and digital media are also available on this subject.  · Take time out at the first sign of a tension headache, if possible. Take yourself out of the stressful situation. Find a quiet, comfortable place to sit or lie down and let yourself relax. Heat and deep massage of the tight areas in the neck and shoulders may help ease muscle spasm. You may also get relief from a medicine like ibuprofen or a prescribed muscle relaxant.  Follow-up care  Follow up with your healthcare provider, or as advised. Talk with your provider if you have frequent headaches. He or she can figure out a treatment plan. Ask if you can have medicine to take at home the next time you get a bad headache. This may keep you from having to visit the emergency department in the future. You may need to see a headache specialist (neurologist) if you continue  to have headaches.  When to seek medical advice  Call your healthcare provider right away if any of these occur:  · Your head pain gets worse during sexual intercourse or strenuous activity  · Your head pain doesnt get better within 24 hours  · You arent able to keep liquids down (repeated vomiting)  · Fever of 100.4ºF (38ºC) or higher, or as directed by your healthcare provider  · Stiff neck  · Extreme drowsiness, confusion, or fainting  · Dizziness or dizziness with spinning sensation (vertigo)  · Weakness in an arm or leg or one side of your face  · You have difficulty speaking  · Your vision changes  Date Last Reviewed: 8/1/2016 © 2000-2017 [employer] [address] [place] [address], [place] [address]. All rights reserved. This information is not intended as a substitute for professional medical care. Always follow your healthcare professional's instructions.      Low-Salt Choices  Eating salt (sodium) can make your body retain too much water. Excess water makes your heart work harder. Canned, packaged, and frozen foods are easy to prepare, but they are often high in sodium. Here are some ideas for low-salt foods you can easily prepare yourself.    For breakfast  · Fruit or 100% fruit juice  · Whole-wheat bread or an English muffin. Compare sodium content on labels.  · Low-fat milk or yogurt  · Unsalted eggs  · Shredded wheat  · Corn tortillas  · Unsalted steamed rice  · Regular (not instant) hot cereal, made without salt  Stay away from:  · Sausage, woodruff, and ham  · Flour tortillas  · Packaged muffins, pancakes, and biscuits  · Instant hot cereals  · Cottage cheese  For lunch and dinner  · Fresh fish, chicken, turkey, or meat--baked, broiled, or roasted without salt  · Dry beans, cooked without salt  · Tofu, stir-fried without salt  · Unsalted fresh fruit and vegetables, or frozen or canned fruit and vegetables with no added salt  Stay away from:  · Lunch or deli meat that is cured or  smoked  · Cheese  · Tomato juice and catsup  · Canned vegetables, soups, and fish not labeled as no-salt-added or reduced sodium  · Packaged gravies and sauces  · Olives, pickles, and relish  · Bottled salad dressings  For snacks and desserts  · Yogurt  · Unsalted, air popped popcorn  · Unsalted nuts or seeds  Stay away from:  · Pies and cakes  · Packaged dessert mixes  · Pizza  · Canned and packaged puddings  · Pretzels, chips, crackers, and nuts--unless the label says unsalted  Date Last Reviewed: 6/17/2015  © 2077-2396 [employer] [address] [place] [address], [place] [address]. All rights reserved. This information is not intended as a substitute for professional medical care. Always follow your healthcare professional's instructions.

## 2020-02-12 ENCOUNTER — TELEPHONE (OUTPATIENT)
Dept: FAMILY MEDICINE | Facility: CLINIC | Age: 45
End: 2020-02-12

## 2020-02-12 NOTE — TELEPHONE ENCOUNTER
Spoke to patient and explained that a co pay card was left at the  for her to help with payment. Patient verbally stated that she understood and would come and pick the card up.

## 2020-02-12 NOTE — TELEPHONE ENCOUNTER
Patient called and expressed that she can't afford 70 dollars a month for Ozempic and would like for you to sent in something else.

## 2020-03-07 NOTE — PROGRESS NOTES
CC:  44-year-old female presents for evaluation of right arm.  The patient has had worsening pain in the right neck, shoulder, and arm over the last several weeks.  She does not recall any particular injury she has woke up and started having pain.  It has progressively gotten worse.  She rates the pain currently as a 10/10.  She states she has had episodes of her arm going numb but that only happened once.  She states that she has pain that radiates down the entire arm.  She also has decreasing range of motion in the shoulder.  She does report an episode of neck pain but states that gotten better but the pain localizes around the posterior superior aspect of the shoulder and then radiates down her arm to her fingers and hand.    She states that she has been taken different anti-inflammatories and muscle relaxers without relief.  She also went saw a chiropractor and he did an adjustment this past Friday and made her pain much worse.    ROS:    Constitution: Denies chills, fever, and sweats.  HENT: Denies headaches or blurry vision.  Cardiovascular: Denies chest pain or irregular heart beat.  Respiratory: Denies cough or shortness of breath.  Gastrointestinal: Denies abdominal pain, nausea, or vomiting.  Genitourinary:  Denies urinary incontinence, bladder and kidney issues  Musculoskeletal:  Denies muscle cramps.  Positive for right shoulder and arm pain.  Neurological: Denies dizziness or focal weakness.  Psychiatric/Behavioral: Normal mental status.  Hematologic/Lymphatic: Denies bleeding problem or easy bruising/bleeding.  Skin: Denies rash or suspicious lesions.    Physical examination     Gen - No acute distress, well nourished, well groomed   Eyes - Extraoccular motions intact, pupils equally round and reactive to light and accommodation   ENT - normocephalic, atruamtic, oropharynx clear   Neck - Supple, no abnormal masses   Cardiovascular - regular rate and rhythm   Pulmonary - clear to auscultation  bilaterally, no wheezes, ronchi, or rales   Abdomen - soft, non-tender, non-distended, positive bowel sounds   Psych - The patient is alert and oriented x3 with normal mood and affect    Right Upper Extremity Examination     Skin is intact throughout   Motor is intact distally radial, median, ulnar, AIN, PIN   +2 radial and ulnar pulses   Sensation to light touch is intact distally radial, median, and ulnar     Examination of the Right shoulder:   ROM:   For - 90 with pain   Abd - 90 with pain  Ext - 20  Int - front pocket    Tenderness to palpation:   Subacromial space - positive  Biceps Tendon - positive  Anterior Glenohumeral Joint - positive  AC joint - negative  Glenohumeral instability - positive  Empty Can test - positive  Speeds test - positive  Frost/Neers sign - positive  Cross-arm adduction test - positive    X-ray images were examined and personally interpreted by me.  Three views of the right shoulder dated 03/09/2020 are available for interpretation.  The humeral head is well reduced in the glenoid.  There are no acute fractures.  No advanced arthritic changes.  Normal bony anatomy.    The patient reports she had an MRI done but she does not know if it is of her spine or her shoulder or her arm.  It was done at an outside facility and we have no access to that study.    Dx:  Possible adhesive capsulitis in this diabetic patient.  Patient also appears to have radicular symptoms from the neck down the arm which could be related to cervical pathology.    Plan:  We made a referral to physical therapy for he has capsulitis of the right shoulder.  I am also going to refer her to pain management because she may have a cervical component with radicular pain down the right upper extremity.  She can follow up with us p.r.n..

## 2020-03-09 ENCOUNTER — HOSPITAL ENCOUNTER (OUTPATIENT)
Dept: RADIOLOGY | Facility: HOSPITAL | Age: 45
Discharge: HOME | End: 2020-03-09
Attending: ORTHOPAEDIC SURGERY
Payer: COMMERCIAL

## 2020-03-09 ENCOUNTER — OFFICE VISIT (OUTPATIENT)
Dept: ORTHOPEDICS | Facility: CLINIC | Age: 45
End: 2020-03-09
Payer: COMMERCIAL

## 2020-03-09 VITALS
SYSTOLIC BLOOD PRESSURE: 168 MMHG | HEIGHT: 66 IN | HEART RATE: 78 BPM | RESPIRATION RATE: 13 BRPM | BODY MASS INDEX: 37.56 KG/M2 | WEIGHT: 233.69 LBS | DIASTOLIC BLOOD PRESSURE: 85 MMHG

## 2020-03-09 DIAGNOSIS — M25.511 RIGHT SHOULDER PAIN, UNSPECIFIED CHRONICITY: Primary | ICD-10-CM

## 2020-03-09 DIAGNOSIS — M54.12 RADICULOPATHY OF CERVICAL SPINE: Primary | ICD-10-CM

## 2020-03-09 DIAGNOSIS — M25.511 RIGHT SHOULDER PAIN, UNSPECIFIED CHRONICITY: ICD-10-CM

## 2020-03-09 PROCEDURE — 73030 X-RAY EXAM OF SHOULDER: CPT | Mod: 26,RT,, | Performed by: RADIOLOGY

## 2020-03-09 PROCEDURE — 3077F SYST BP >= 140 MM HG: CPT | Mod: CPTII,S$GLB,, | Performed by: ORTHOPAEDIC SURGERY

## 2020-03-09 PROCEDURE — 73030 XR SHOULDER TRAUMA 3 VIEW RIGHT: ICD-10-PCS | Mod: 26,RT,, | Performed by: RADIOLOGY

## 2020-03-09 PROCEDURE — 3079F DIAST BP 80-89 MM HG: CPT | Mod: CPTII,S$GLB,, | Performed by: ORTHOPAEDIC SURGERY

## 2020-03-09 PROCEDURE — 3077F PR MOST RECENT SYSTOLIC BLOOD PRESSURE >= 140 MM HG: ICD-10-PCS | Mod: CPTII,S$GLB,, | Performed by: ORTHOPAEDIC SURGERY

## 2020-03-09 PROCEDURE — 3008F PR BODY MASS INDEX (BMI) DOCUMENTED: ICD-10-PCS | Mod: CPTII,S$GLB,, | Performed by: ORTHOPAEDIC SURGERY

## 2020-03-09 PROCEDURE — 99204 PR OFFICE/OUTPT VISIT, NEW, LEVL IV, 45-59 MIN: ICD-10-PCS | Mod: S$GLB,,, | Performed by: ORTHOPAEDIC SURGERY

## 2020-03-09 PROCEDURE — 3079F PR MOST RECENT DIASTOLIC BLOOD PRESSURE 80-89 MM HG: ICD-10-PCS | Mod: CPTII,S$GLB,, | Performed by: ORTHOPAEDIC SURGERY

## 2020-03-09 PROCEDURE — 3008F BODY MASS INDEX DOCD: CPT | Mod: CPTII,S$GLB,, | Performed by: ORTHOPAEDIC SURGERY

## 2020-03-09 PROCEDURE — 99999 PR PBB SHADOW E&M-EST. PATIENT-LVL III: CPT | Mod: PBBFAC,,, | Performed by: ORTHOPAEDIC SURGERY

## 2020-03-09 PROCEDURE — 99999 PR PBB SHADOW E&M-EST. PATIENT-LVL III: ICD-10-PCS | Mod: PBBFAC,,, | Performed by: ORTHOPAEDIC SURGERY

## 2020-03-09 PROCEDURE — 73030 X-RAY EXAM OF SHOULDER: CPT | Mod: TC,PN,RT

## 2020-03-09 PROCEDURE — 99204 OFFICE O/P NEW MOD 45 MIN: CPT | Mod: S$GLB,,, | Performed by: ORTHOPAEDIC SURGERY

## 2020-03-09 RX ORDER — TRAMADOL HYDROCHLORIDE 50 MG/1
TABLET ORAL
COMMUNITY
Start: 2020-02-19 | End: 2020-06-16

## 2020-03-09 RX ORDER — CYCLOBENZAPRINE HCL 10 MG
TABLET ORAL
COMMUNITY
Start: 2020-02-19 | End: 2020-06-16

## 2020-03-09 RX ORDER — MELOXICAM 15 MG/1
TABLET ORAL
COMMUNITY
Start: 2020-02-19 | End: 2020-04-08

## 2020-03-10 ENCOUNTER — HOSPITAL ENCOUNTER (OUTPATIENT)
Dept: RADIOLOGY | Facility: HOSPITAL | Age: 45
Discharge: HOME | End: 2020-03-10
Attending: ANESTHESIOLOGY
Payer: COMMERCIAL

## 2020-03-10 ENCOUNTER — OFFICE VISIT (OUTPATIENT)
Dept: PAIN MEDICINE | Facility: CLINIC | Age: 45
End: 2020-03-10
Payer: COMMERCIAL

## 2020-03-10 VITALS
SYSTOLIC BLOOD PRESSURE: 140 MMHG | DIASTOLIC BLOOD PRESSURE: 88 MMHG | WEIGHT: 233.69 LBS | BODY MASS INDEX: 37.56 KG/M2 | HEIGHT: 66 IN | HEART RATE: 79 BPM

## 2020-03-10 DIAGNOSIS — M54.12 RADICULOPATHY OF CERVICAL SPINE: ICD-10-CM

## 2020-03-10 DIAGNOSIS — M54.12 CERVICAL RADICULOPATHY: Primary | ICD-10-CM

## 2020-03-10 DIAGNOSIS — M54.12 CERVICAL RADICULOPATHY: ICD-10-CM

## 2020-03-10 DIAGNOSIS — M75.01 ADHESIVE CAPSULITIS OF RIGHT SHOULDER: ICD-10-CM

## 2020-03-10 PROCEDURE — 99999 PR PBB SHADOW E&M-EST. PATIENT-LVL IV: ICD-10-PCS | Mod: PBBFAC,,, | Performed by: ANESTHESIOLOGY

## 2020-03-10 PROCEDURE — 72050 X-RAY EXAM NECK SPINE 4/5VWS: CPT | Mod: 26,,, | Performed by: RADIOLOGY

## 2020-03-10 PROCEDURE — 99244 OFF/OP CNSLTJ NEW/EST MOD 40: CPT | Mod: S$GLB,,, | Performed by: ANESTHESIOLOGY

## 2020-03-10 PROCEDURE — 99244 PR OFFICE CONSULTATION,LEVEL IV: ICD-10-PCS | Mod: S$GLB,,, | Performed by: ANESTHESIOLOGY

## 2020-03-10 PROCEDURE — 72050 X-RAY EXAM NECK SPINE 4/5VWS: CPT | Mod: TC,FY

## 2020-03-10 PROCEDURE — 99999 PR PBB SHADOW E&M-EST. PATIENT-LVL IV: CPT | Mod: PBBFAC,,, | Performed by: ANESTHESIOLOGY

## 2020-03-10 PROCEDURE — 72050 XR CERVICAL SPINE COMPLETE 5 VIEW: ICD-10-PCS | Mod: 26,,, | Performed by: RADIOLOGY

## 2020-03-10 RX ORDER — GABAPENTIN 300 MG/1
300 CAPSULE ORAL 3 TIMES DAILY
Qty: 90 CAPSULE | Refills: 11 | Status: SHIPPED | OUTPATIENT
Start: 2020-03-10 | End: 2021-01-08

## 2020-03-10 RX ORDER — IBUPROFEN 800 MG/1
800 TABLET ORAL EVERY 8 HOURS PRN
Qty: 90 TABLET | Refills: 2 | Status: SHIPPED | OUTPATIENT
Start: 2020-03-10 | End: 2020-07-09 | Stop reason: SDUPTHER

## 2020-03-10 NOTE — PROGRESS NOTES
Referring Physician: Brando Macias II, MD    PCP: MICHAEL Murdock    CC: neck and right arm pain    HPI:   Haydee Allen is a 44 y.o. female with PMH significant for HTN, poorly controlled DM on insulin (Hgb A1C > 10), and hx of left ankle injury requiring surgery presents as a referral for the evaluation for right sided neck and right arm pain. Patient reports that her pain approximately 2 months ago after no inciting incident or trauma. The patient localizes her pain to the right side of her neck. The patient reports of radiation down her right arm to her wrist. The patient denies of any numbness in her hands. The patient describes her pain as a sharp type of pain. The patient reports that her current pain is a 6/10. Patient denies of any urinary/fecal incontinence, saddle anesthesia, or weakness.     Of note, the patient reports that she currently working as a .     Also of note, the patient has been seen and evaluated by Dr. Brando Macias on 3/9/2020 who diagnosed the patient with adhesive capsulitis and provided her with a physical therapy referral for evaluation and treatment.     Aggravating factors: sleeping on her right shoulder     Mitigating factors: ibuprofen; heating pads    Relevant Surgeries: no    Interventional Therapies: no    : Reviewed and consistent with medication use as prescribed.  2/19/2020: Tramadol 50 mg; #20 tablets  10/22/2019: Tramadol 50 mg; #20 tablets  7/16/2019: Percocet 5-325 mg; #10 tablets  7/4/2019L Percocet 5-325 mg; #20 tablets    Non-pharmacologic Treatment:     · Physical Therapy: no; scheduled to start soon for right shoulder pain  · Ice/Heat: yes; just uses heating pad  · TENS: no  · Massage: no  · Chiropractic care: yes; worsened pain  · Acupuncture: no         Pain Medications:         · Currently taking: Ibuprofen every 6 hours; cyclobenzaprine 10 mg PO QHS, gabapentin 300 mg PO q day    · Has tried in the past:    · Opioids: yes; tramadol  "-no relief  · NSAIDS: yes; ibuprofen is better than naproxen; cannot recall taking meloxicam   · Tylenol: yes; believes she tried "arthritis" pills - minimal relief    · Muscle relaxants: yes  · TCAs: no  · SNRIs: no; but takes zoloft PRN  · Anticonvulsants: yes  · topical creams: no    Anticoagulation: no    ROS:  Review of Systems   Constitutional: Negative for chills and fever.   HENT: Negative for sore throat.    Eyes: Negative for visual disturbance.   Respiratory: Negative for shortness of breath.    Cardiovascular: Negative for chest pain.   Gastrointestinal: Negative for nausea and vomiting.   Genitourinary: Negative for difficulty urinating.   Musculoskeletal: Positive for arthralgias and neck pain.   Skin: Negative for rash.   Allergic/Immunologic: Negative for immunocompromised state.   Neurological: Negative for syncope.   Hematological: Does not bruise/bleed easily.   Psychiatric/Behavioral: Negative for suicidal ideas.        Past Medical History:   Diagnosis Date    Abdominal pain     RIGHT MID ABD    Anemia     Anxiety     Arthritis     Complex cyst of right ovary 2019    Diabetes mellitus     Diabetes mellitus, type 2     Encounter for blood transfusion     2    Encounter for well woman exam with routine gynecological exam 2019    Hypertension     Menorrhagia with regular cycle 2019    Obesity     Ovarian cyst     right    Problem involving surgical incision 2019    Sinus congestion     Thyroid disease      Past Surgical History:   Procedure Laterality Date     SECTION      LAPAROSCOPIC SALPINGO-OOPHORECTOMY Bilateral 7/3/2019    Procedure: SALPINGO-OOPHORECTOMY, LAPAROSCOPIC;  Surgeon: Darin Hale MD;  Location: [facility] OR;  Service: OB/GYN;  Laterality: Bilateral;    LAPAROSCOPIC TOTAL HYSTERECTOMY N/A 7/3/2019    Procedure: HYSTERECTOMY, TOTAL, LAPAROSCOPIC;  Surgeon: Darin Hale MD;  Location: [facility] OR;  Service: OB/GYN;  Laterality: N/A; " "   OPEN REDUCTION AND INTERNAL FIXATION (ORIF) OF INJURY OF ANKLE Left     THYROIDECTOMY, PARTIAL      TUBAL LIGATION       Family History   Problem Relation Age of Onset    Cancer Mother     Hypertension Mother     Diabetes Mother     Arthritis Mother     Heart disease Mother     Kidney disease Mother     Cancer Father      Social History     Socioeconomic History    Marital status: Single     Spouse name: Not on file    Number of children: Not on file    Years of education: Not on file    Highest education level: Not on file   Occupational History    Not on file   Social Needs    Financial resource strain: Not on file    Food insecurity:     Worry: Not on file     Inability: Not on file    Transportation needs:     Medical: Not on file     Non-medical: Not on file   Tobacco Use    Smoking status: Never Smoker    Smokeless tobacco: Never Used   Substance and Sexual Activity    Alcohol use: No    Drug use: No    Sexual activity: Never   Lifestyle    Physical activity:     Days per week: Not on file     Minutes per session: Not on file    Stress: Not at all   Relationships    Social connections:     Talks on phone: Not on file     Gets together: Not on file     Attends Alevism service: Not on file     Active member of club or organization: Not on file     Attends meetings of clubs or organizations: Not on file     Relationship status: Not on file   Other Topics Concern    Not on file   Social History Narrative    Not on file         Allergies: See med card    Vitals:    03/10/20 1309   BP: (!) 140/88   Pulse: 79   Weight: 106 kg (233 lb 11 oz)   Height: 5' 6" (1.676 m)   PainSc: 10-Worst pain ever   PainLoc: Generalized         Physical exam:  Physical Exam   Constitutional: She is oriented to person, place, and time and well-developed, well-nourished, and in no distress.   HENT:   Head: Normocephalic and atraumatic.   Eyes: Conjunctivae and EOM are normal. Right eye exhibits no " discharge. Left eye exhibits no discharge.   Cardiovascular: Normal rate.   Pulmonary/Chest: Effort normal and breath sounds normal. No respiratory distress.   Abdominal: Soft.   Neurological: She is alert and oriented to person, place, and time.   Skin: Skin is warm and dry. No rash noted. She is not diaphoretic.   Psychiatric: Mood, memory, affect and judgment normal.   Nursing note and vitals reviewed.       UPPER EXTREMITIES: Normal alignment, normal range of motion, no atrophy, no skin changes,  hair growth and nail growth normal and equal bilaterally. No swelling, no tenderness.      Right shoulder:  Unable to abduct to 90 degrees secondary to pain  Internal and external rotation of the shoulder with pain  TTP at the anterior glenohumeral joint     LOWER EXTREMITIES:  Normal alignment, normal range of motion, no atrophy, no skin changes,  hair growth and nail growth normal and equal bilaterally. No swelling, no tenderness.    CERVICAL SPINE:  Cervical spine: ROM is full in flexion, extension and lateral rotation with increased pain with left lateral rotation.  ((+)) Spurling's maneuver on the right  Myofascial exam:Tenderness to palpation across cervical paraspinous region bilaterally.    CRANIAL NERVES:  II:  PERRL bilaterally,   III,IV,VI: EOMI.    V:  Facial sensation equal bilaterally  VII:  Facial motor function normal.  VIII:  Hearing equal to finger rub bilaterally  IX/X: Gag normal, palate symmetric  XI:  Shoulder shrug equal, head turn equal  XII:  Tongue midline without fasciculations      MOTOR: Tone and bulk: normal bilateral upper and lower Strength: normal   Delt Bi Tri WE WF     R 5 5 5 5 5 5   L 5 5 5 5 5 5     IP ADD ABD Quad TA Gas HAM  R 5 5 5 5 5 5 5  L 5 5 5 5 5 5 5    SENSATION: Light touch and pinprick intact bilaterally  REFLEXES: normal, symmetric, nonbrisk.  Toes down, no clonus. Negative fagan's sign bilaterally.  GAIT: normal rise, base, steps, and arm swing.       Imaging:  X-ray right shoulder (3/9/2020):  No fracture or dislocation.  The soft tissues are unremarkable.    Assessment: Haydee Allen is a 44 y.o. female with PMH significant for HTN, poorly controlled DM on insulin (Hgb A1C > 10), and hx of left ankle injury requiring surgery presents as a referral for the evaluation for right sided neck and right arm pain. Patient has been seen and evaluated by Orthopedic surgeon Dr. Brando Macias who diagnosed the patient with adhesive capsulitis. The patient presents today for evaluation of possible cervicogenic contributions to her pain. Physical exam was significant for radicular pain with Spurling's maneuver. No dedicated imaging of the cervical spine to review. Treatment plan outlined below.     Plan:  - Order X-ray cervical spine for further evaluation   - Encouraged patient to participate with PT for evaluation and treatment of adhesive capsulitis. I have stressed the importance of physical activity and a home exercise plan to help with chronic pain and improve health.  - Increase gabapentin to 300 mg PO TID for neuropathic pain  - Prescribed ibuprofen 800 mg PO TID PRN for pain  - RTC s/p imaging to discuss results and interventions as appropriate    Thank you for referring this interesting patient, and I look forward to continuing to collaborate in her care.    Ary Andrade MD  Pain Management

## 2020-03-10 NOTE — LETTER
March 10, 2020      Brando Macias II, MD  [address] [place] [address] Dr Louise [place] [id number]           [facility]  [address] [place] [address]  [address]  [place] [address]  Phone: [phone]  Fax: [phone]          Patient: Haydee Allen   MR Number: [id number]   YOB: 1975   Date of Visit: 3/10/2020       Dear Dr. Brando Macias II:    Thank you for referring Haydee Allen to me for evaluation. Attached you will find relevant portions of my assessment and plan of care.    If you have questions, please do not hesitate to call me. I look forward to following Haydee Allen along with you.    Sincerely,    Ary Andrade MD    Enclosure  CC:  No Recipients    If you would like to receive this communication electronically, please contact [email or web address] or [phone] to request more information on [email or web address] Link access.    For providers and/or their staff who would like to refer a patient to [facility], please contact us through our one-stop-shop provider referral line, [facility] , at [phone].    If you feel you have received this communication in error or would no longer like to receive these types of communications, please e-mail [email or web address]

## 2020-03-12 ENCOUNTER — TELEPHONE (OUTPATIENT)
Dept: PAIN MEDICINE | Facility: CLINIC | Age: 45
End: 2020-03-12

## 2020-03-12 DIAGNOSIS — M75.01 ADHESIVE CAPSULITIS OF RIGHT SHOULDER: Primary | ICD-10-CM

## 2020-03-12 RX ORDER — CELECOXIB 200 MG/1
200 CAPSULE ORAL 2 TIMES DAILY
Qty: 60 CAPSULE | Refills: 0 | Status: SHIPPED | OUTPATIENT
Start: 2020-03-12 | End: 2020-04-15

## 2020-03-18 ENCOUNTER — TELEPHONE (OUTPATIENT)
Dept: FAMILY MEDICINE | Facility: CLINIC | Age: 45
End: 2020-03-18

## 2020-03-18 DIAGNOSIS — E11.9 TYPE 2 DIABETES MELLITUS WITHOUT COMPLICATION, WITH LONG-TERM CURRENT USE OF INSULIN: Primary | ICD-10-CM

## 2020-03-18 DIAGNOSIS — Z79.4 TYPE 2 DIABETES MELLITUS WITHOUT COMPLICATION, WITH LONG-TERM CURRENT USE OF INSULIN: Primary | ICD-10-CM

## 2020-03-18 NOTE — TELEPHONE ENCOUNTER
----- Message from Suzie Duarte LPN sent at 3/18/2020  9:24 AM CDT -----  The PA has been completed and approved but the patient refuse to pay for the medication.

## 2020-03-18 NOTE — TELEPHONE ENCOUNTER
Spoke to patient and she states that it's 50.00 dollars with the discount card and she can't afford it.

## 2020-03-18 NOTE — TELEPHONE ENCOUNTER
Spoke to patient and informed her of the medication change, patient verbally stated that she understood. Patient was reminded to do her labs but she stated that she went today and [facility] was closed to the public and that she would call us tomorrow and let us know if it remains closed.

## 2020-03-19 ENCOUNTER — CLINICAL SUPPORT (OUTPATIENT)
Dept: REHABILITATION | Facility: HOSPITAL | Age: 45
End: 2020-03-19
Attending: ORTHOPAEDIC SURGERY
Payer: COMMERCIAL

## 2020-03-19 ENCOUNTER — TELEPHONE (OUTPATIENT)
Dept: PAIN MEDICINE | Facility: CLINIC | Age: 45
End: 2020-03-19

## 2020-03-19 DIAGNOSIS — R29.3 POSTURE ABNORMALITY: ICD-10-CM

## 2020-03-19 DIAGNOSIS — M25.511 RIGHT SHOULDER PAIN, UNSPECIFIED CHRONICITY: ICD-10-CM

## 2020-03-19 PROCEDURE — 97110 THERAPEUTIC EXERCISES: CPT | Mod: PN | Performed by: PHYSICAL THERAPIST

## 2020-03-19 PROCEDURE — 97161 PT EVAL LOW COMPLEX 20 MIN: CPT | Mod: PN | Performed by: PHYSICAL THERAPIST

## 2020-03-19 NOTE — PLAN OF CARE
[facility]  Physical Therapy Initial Evaluation    Name: Haydee Allen  Clinic Number: [id number]    Therapy Diagnosis:   Encounter Diagnoses   Name Primary?    Right shoulder pain, unspecified chronicity     Posture abnormality      Physician: Brando Macias II, MD    Physician Orders: PT Eval and Treat   Medical Diagnosis from Referral: Right shoulder pain, unspecified chronicity  Evaluation Date: 3/19/2020  Authorization Period Expiration: 2020  Plan of Care Expiration: 2020  Visit # / Visits authorized:     Time In: 1014  Time Out: 1105  Total Billable Time: 51 minutes    Precautions: Standard    Subjective   Date of onset: 2020  History of current condition - Haydee reports: she woke up one morning in January with right lateral shoulder pain which has progressed to the right upper trapezius region and right UE. She reports pain with reaching overhead, behind the back and across the body. She denies any recent accidents or falls.     Medical History:   Past Medical History:   Diagnosis Date    Abdominal pain     RIGHT MID ABD    Anemia     Anxiety     Arthritis     Complex cyst of right ovary 2019    Diabetes mellitus     Diabetes mellitus, type 2     Encounter for blood transfusion     2    Encounter for well woman exam with routine gynecological exam 2019    Hypertension     Menorrhagia with regular cycle 2019    Obesity     Ovarian cyst     right    Problem involving surgical incision 2019    Sinus congestion     Thyroid disease        Surgical History:   Haydee Allen  has a past surgical history that includes Thyroidectomy, partial;  section; Tubal ligation; Open reduction and internal fixation (ORIF) of injury of ankle (Left); Laparoscopic total hysterectomy (N/A, 7/3/2019); and Laparoscopic salpingo-oophorectomy (Bilateral, 7/3/2019).    Medications:   Haydee has a current medication list which  includes the following prescription(s): atorvastatin, blood sugar diagnostic, celecoxib, cyclobenzaprine, docusate sodium, dulaglutide, ferrous sulfate, gabapentin, ibuprofen, levothyroxine, lisinopril, meloxicam, metformin, naproxen, nystatin, pen needle, diabetic, sertraline, and tramadol, and the following Facility-Administered Medications: lidocaine (pf) 10 mg/ml (1%).    Allergies:   Review of patient's allergies indicates:  No Known Allergies     Imaging, X-rays: see Epic    Prior Therapy: None  Social History:  lives with their family  Occupation:    Prior Level of Function: Independent  Current Level of Function: Decreased ability to perform ADL with the right UE    Pain:  Current 5/10, worst 10/10, best 5/10   Location: right shoulder   Description: Needlelike and pressure sensations  Aggravating Factors: Reaching overhead, behind the back and across the body  Easing Factors: heating pad    Pts goals: Return to PLOF    Objective     Posture: Decreased lumbar lordosis and forward head in standing  Palpation: Severe point tenderness noted with palpation of the right subacromial space  Sensation: Intact  Range of Motion/Strength:     Shoulder Left Right Pain/Dysfunction with Movement   AROM      flexion  172*  145*    extension        abduction  148*  144*    adduction        Internal rotation  T8      ER at 90° abd  60*  46*    ER at 0° abd            Shoulder Left Right Pain/Dysfunction with Movement   PROM      flexion  174*  160*    extension        abduction  158*  150*    adduction        Internal rotation  T7  T9    ER at 90° abd  74*  62*    ER at 0° abd            UE MMT Left Right Pain/Dysfunction with Movement   Shoulder Flexion 5/5 4/5    Shoulder Extension 5/5 4+/5    Shoulder Abduction 5/5 4/5    Shoulder Adduction 5/5 4+/5    Shoulder IR 4+/5 4+/5    Shoulder ER  @ 0* Abduction 4+/5 4-/5      Special Tests Left Right Comments   Neer negative positive    Frost & Hong negative  positive    Empty can negative positive        Other:       CMS Impairment/Limitation/Restriction for FOTO  Survey    Therapist reviewed FOTO scores for Haydee Allen on 3/19/2020.   FOTO documents entered into [employer] - see Media section.    Limitation Score: 63%  Category: Carrying    Current : CL = least 60% but < 80% impaired, limited or restricted  Goal: CJ = at least 20% but < 40% impaired, limited or restricted           TREATMENT   Treatment Time In: 1035  Treatment Time Out: 1105  Total Treatment time separate from Evaluation: 30 minutes    Haydee received therapeutic exercises to develop strength, ROM and posture for 30 minutes including:    OHP flexion 4 min  OHP scaption 4 min  Seated table flexion stretch 3 x 10  Seated table ER stretch 3 x 10  Shoulder extension with GTB 3 x 10  Shoulder adduction with GTB 3 x 10  Scapular retraction with GTB 3 x 10  Mid rows with GTB 3 x 10    Home Exercises and Patient Education Provided    Education provided:   - Posture education    Written Home Exercises Provided: yes.  Exercises were reviewed and Haydee was able to demonstrate them prior to the end of the session.  Haydee demonstrated good  understanding of the education provided.     See EMR under Patient Instructions for exercises provided 3/19/2020.    Assessment   Haydee is a 44 y.o. female referred to outpatient Physical Therapy with a medical diagnosis of Right shoulder pain, unspecified chronicity. Pt presents with     1. Right shoulder pain  2. Decreased right shoulder ROM  3. Decreased right shoulder strength  4. Posture abnormality  5. Decrease ability to perform ADL with the right UE    Pt prognosis is Good.   Pt will benefit from skilled outpatient Physical Therapy to address the deficits stated above and in the chart below, provide pt/family education, and to maximize pt's level of independence.     Plan of care discussed with patient: Yes  Pt's spiritual, cultural and educational needs  considered and patient is agreeable to the plan of care and goals as stated below:     Anticipated Barriers for therapy: none    Medical Necessity is demonstrated by the following  History  Co-morbidities and personal factors that may impact the plan of care Co-morbidities:   anxiety, diabetes, high BMI and HTN    Personal Factors:   no deficits     moderate   Examination  Body Structures and Functions, activity limitations and participation restrictions that may impact the plan of care Body Regions:   neck  upper extremities    Body Systems:    ROM  strength    Participation Restrictions:   none    Activity limitations:   Learning and applying knowledge  no deficits    General Tasks and Commands  no deficits    Communication  no deficits    Mobility  no deficits    Self care  no deficits    Domestic Life  no deficits    Interactions/Relationships  no deficits    Life Areas  no deficits    Community and Social Life  no deficits         moderate   Clinical Presentation stable and uncomplicated low   Decision Making/ Complexity Score: low     Goals:  Short Term Goals: 3 weeks   1. The patient will begin a written HEP  2. Increase active flexion and abduction to 160*  3. Increase active external rotation to 55*  4. Decrease pain at worst to 5/10    Long Term Goals: 6 weeks   1. The patient will be independent with a HEP for maintenace  2. Decrease soft tissue tenderness to mild  3. Decrease FOTO score to 37%  4. Increase UE strength to 5/5  5. The patient will be able to lift 9 pounds from waist to crown level    Plan   Plan of care Certification: 3/19/2020 to 5/8/2020.    Outpatient Physical Therapy 2 times weekly for 6 weeks to include the following interventions: Manual Therapy, Moist Heat/ Ice, Neuromuscular Re-ed, Patient Education, Therapeutic Activites and Therapeutic Exercise.     Wilfredo Nash, PT

## 2020-03-19 NOTE — TELEPHONE ENCOUNTER
Spoke to pt canceled upcoming appt do to covid-19. Pt was coming in to discuss xray results. Can pt be called with results? Pt was also informed Dr. Andrade is out of office until next we. Pt verbalizes understanding. Pt also states the meds prescribed is not helping with her pain. Pt states this can wait until Dr. Andrade's return

## 2020-03-19 NOTE — PATIENT INSTRUCTIONS
ROM: External Rotation        Keeping right forearm palm down on table, bend forward at waist until stretch is felt. Hold __10__ seconds.  Repeat __10__ times per set. Do __3__ sets per session. Do __1-2__ sessions per day.     [email or web address]://[email or web address]     Copyright © [employer]. All rights reserved.   ROM: Flexion (Alternate)        Slide right arm up wall, with palm out, by leaning toward wall. Hold __3__ seconds.  Repeat __10__ times per set. Do __3__ sets per session. Do __1-2__ sessions per day.     [email or web address]     Copyright © [employer]. All rights reserved.     Strengthening: Resisted Extension           STOP AT YOUR THIGH        Hold tubing in right hand, arm forward. Pull arm back, elbow straight.  Repeat __10__ times per set. Do __3__ sets per session. Do __1__ sessions per day.     [email or web address]     Copyright © [employer]. All rights reserved.   Strengthening: Resisted Adduction                STOP AT YOUR THIGH        Hold tubing in left hand, arm out. Pull arm toward opposite hip. Do not twist or rotate trunk.  Repeat __10__ times per set. Do __3__ sets per session. Do __1__ sessions per day.     [email or web address]     Copyright © [employer]. All rights reserved.   Resisted External Rotation: in Neutral - Bilateral                  PALMS UP!        Sit or stand, tubing in both hands, elbows at sides, bent to 90°, forearms forward. Pinch shoulder blades together and rotate forearms out. Keep elbows at sides.  Repeat __10__ times per set. Do __3__ sets per session. Do __1__ sessions per day.     [email or web address]     Copyright © [employer]. All rights reserved.   Scapular Retraction: Bilateral        Facing anchor, pull arms back, bringing shoulder blades together.  Repeat __10__ times per set. Do __3__ sets per session. Do __1__ sessions per day.     [email or web address]     Copyright © [employer]. All rights reserved.

## 2020-03-20 ENCOUNTER — TELEPHONE (OUTPATIENT)
Dept: FAMILY MEDICINE | Facility: CLINIC | Age: 45
End: 2020-03-20

## 2020-03-20 DIAGNOSIS — E03.9 ACQUIRED HYPOTHYROIDISM: Primary | ICD-10-CM

## 2020-03-20 LAB
HBA1C MFR BLD: 10.2 % OF TOTAL HGB
T4 FREE SERPL-MCNC: 2 NG/DL (ref 0.8–1.8)
TSH SERPL-ACNC: 0.05 MIU/L

## 2020-03-20 RX ORDER — LEVOTHYROXINE SODIUM 200 UG/1
200 TABLET ORAL
Qty: 30 TABLET | Refills: 2 | Status: SHIPPED | OUTPATIENT
Start: 2020-03-20 | End: 2020-06-16 | Stop reason: SDUPTHER

## 2020-03-20 NOTE — TELEPHONE ENCOUNTER
Decreasing dosage of Synthroid to 200mcg daily. She needs to start the decreased dose tomorrow. She needs to stop the 300mcg Synthroid. We will discuss this lab result and all other lab results at her next visit.

## 2020-03-20 NOTE — TELEPHONE ENCOUNTER
----- Message from MICHAEL Ley sent at 3/20/2020  8:04 AM CDT -----  Please call patient. Please verify she is still taking just the 300mcg of Synthroid as I prescribed. Her thyroid is too stimulated currently.

## 2020-03-20 NOTE — TELEPHONE ENCOUNTER
Spoke to patient and informed her of the medication change and instructed her to start on tomorrow. Patient stated that she verbally understood. I also expressed that the other concerns will be discussed at her next visit.

## 2020-03-20 NOTE — TELEPHONE ENCOUNTER
"Spoke to patient and she states that she is still currently on the 300 mcg dose. Patient also stated that " her body is doing this, because she didn't have her insulin in 2 weeks.  "

## 2020-03-20 NOTE — PROGRESS NOTES
Please call patient. Please verify she is still taking just the 300mcg of Synthroid as I prescribed. Her thyroid is too stimulated currently.

## 2020-03-23 ENCOUNTER — TELEPHONE (OUTPATIENT)
Dept: REHABILITATION | Facility: HOSPITAL | Age: 45
End: 2020-03-23

## 2020-03-23 ENCOUNTER — TELEPHONE (OUTPATIENT)
Dept: ADMINISTRATIVE | Facility: OTHER | Age: 45
End: 2020-03-23

## 2020-03-23 NOTE — TELEPHONE ENCOUNTER
Called the patient via telephone to discuss most recent x-ray results. The patient's x-ray of the cervical spine was significant for mild DDD, facet arthropathy, and neural foraminal stenosis. While I do believe that her cervical spine pathology may be contributing to the patient's right shoulder pain, I believe the majority of her pain is secondary to intrinsic shoulder pathology. This was explained to the patient. All questions and concerns addressed.     Instructed the patient to continue her participation with physical therapy with the emphasis on evaluation and treatment of right shoulder pain. Patient expressed understanding.     Ary Andrade MD  Pain Management

## 2020-03-24 DIAGNOSIS — B37.2 YEAST INFECTION OF THE SKIN: ICD-10-CM

## 2020-03-24 RX ORDER — NYSTATIN 100000 U/G
OINTMENT TOPICAL 2 TIMES DAILY
Qty: 15 G | Refills: 0 | Status: SHIPPED | OUTPATIENT
Start: 2020-03-24 | End: 2020-09-23 | Stop reason: SDUPTHER

## 2020-03-31 DIAGNOSIS — I10 ESSENTIAL HYPERTENSION: ICD-10-CM

## 2020-03-31 RX ORDER — LISINOPRIL 40 MG/1
TABLET ORAL
Qty: 30 TABLET | Refills: 4 | Status: SHIPPED | OUTPATIENT
Start: 2020-03-31 | End: 2020-06-16 | Stop reason: SDUPTHER

## 2020-04-08 ENCOUNTER — OFFICE VISIT (OUTPATIENT)
Dept: FAMILY MEDICINE | Facility: CLINIC | Age: 45
End: 2020-04-08
Payer: COMMERCIAL

## 2020-04-08 ENCOUNTER — TELEPHONE (OUTPATIENT)
Dept: FAMILY MEDICINE | Facility: CLINIC | Age: 45
End: 2020-04-08

## 2020-04-08 VITALS
HEIGHT: 66 IN | BODY MASS INDEX: 38.49 KG/M2 | SYSTOLIC BLOOD PRESSURE: 120 MMHG | HEART RATE: 86 BPM | DIASTOLIC BLOOD PRESSURE: 88 MMHG | OXYGEN SATURATION: 98 % | WEIGHT: 239.5 LBS | TEMPERATURE: 99 F | RESPIRATION RATE: 16 BRPM

## 2020-04-08 DIAGNOSIS — M25.511 RIGHT SHOULDER PAIN, UNSPECIFIED CHRONICITY: ICD-10-CM

## 2020-04-08 DIAGNOSIS — E11.42 TYPE 2 DIABETES MELLITUS WITH DIABETIC POLYNEUROPATHY, WITHOUT LONG-TERM CURRENT USE OF INSULIN: Primary | ICD-10-CM

## 2020-04-08 DIAGNOSIS — E03.9 HYPOTHYROIDISM, UNSPECIFIED TYPE: ICD-10-CM

## 2020-04-08 DIAGNOSIS — D50.9 IRON DEFICIENCY ANEMIA, UNSPECIFIED IRON DEFICIENCY ANEMIA TYPE: ICD-10-CM

## 2020-04-08 PROBLEM — E11.49 TYPE 2 DIABETES MELLITUS WITH NEUROLOGIC COMPLICATION, WITHOUT LONG-TERM CURRENT USE OF INSULIN: Status: ACTIVE | Noted: 2017-09-21

## 2020-04-08 PROCEDURE — 99214 OFFICE O/P EST MOD 30 MIN: CPT | Mod: S$GLB,,, | Performed by: NURSE PRACTITIONER

## 2020-04-08 PROCEDURE — 3074F SYST BP LT 130 MM HG: CPT | Mod: S$GLB,,, | Performed by: NURSE PRACTITIONER

## 2020-04-08 PROCEDURE — 3046F PR MOST RECENT HEMOGLOBIN A1C LEVEL > 9.0%: ICD-10-PCS | Mod: S$GLB,,, | Performed by: NURSE PRACTITIONER

## 2020-04-08 PROCEDURE — 99214 PR OFFICE/OUTPT VISIT, EST, LEVL IV, 30-39 MIN: ICD-10-PCS | Mod: S$GLB,,, | Performed by: NURSE PRACTITIONER

## 2020-04-08 PROCEDURE — 3008F PR BODY MASS INDEX (BMI) DOCUMENTED: ICD-10-PCS | Mod: S$GLB,,, | Performed by: NURSE PRACTITIONER

## 2020-04-08 PROCEDURE — 3008F BODY MASS INDEX DOCD: CPT | Mod: S$GLB,,, | Performed by: NURSE PRACTITIONER

## 2020-04-08 PROCEDURE — 3074F PR MOST RECENT SYSTOLIC BLOOD PRESSURE < 130 MM HG: ICD-10-PCS | Mod: S$GLB,,, | Performed by: NURSE PRACTITIONER

## 2020-04-08 PROCEDURE — 3046F HEMOGLOBIN A1C LEVEL >9.0%: CPT | Mod: S$GLB,,, | Performed by: NURSE PRACTITIONER

## 2020-04-08 PROCEDURE — 3079F DIAST BP 80-89 MM HG: CPT | Mod: S$GLB,,, | Performed by: NURSE PRACTITIONER

## 2020-04-08 PROCEDURE — 3079F PR MOST RECENT DIASTOLIC BLOOD PRESSURE 80-89 MM HG: ICD-10-PCS | Mod: S$GLB,,, | Performed by: NURSE PRACTITIONER

## 2020-04-08 RX ORDER — BLOOD SUGAR DIAGNOSTIC
1 STRIP MISCELLANEOUS DAILY
Qty: 90 EACH | Refills: 1 | Status: SHIPPED | OUTPATIENT
Start: 2020-04-08 | End: 2021-01-12 | Stop reason: SDUPTHER

## 2020-04-08 NOTE — PROGRESS NOTES
SUBJECTIVE:      Patient ID: Haydee Allen is a 44 y.o. female.    Chief Complaint: Follow-up (3 month DM, HTN)    Pt presents for F/U thyroid disorder, DMII, and HTN. She reports attempting to follow a diabetic diet, drinking Crystal Light and water. Reports baking all her food currently. She reports compliance with her diabetes medication, monitoring regimen, and reports daily exercise, most recently three times daily which she reports she walks around her neighborhood. Reports the Trulicity is causing an itchy, rash-like reaction and reports this has happened in the past with this medication. Her last injection was yesterday 4/7/2020. She tolerated the Ozempic well but was unable to afford the medication. Reports her blood sugars are taken between 6am-7am and are usually 120s. She is also doing her therapy exercises at home since PT was cancelled during the COVID crisis. She has since last visit begun seeing Dr. Andrade for R shoulder pain. She also reports compliance with her thyroid medication (recently decreased to 200mcg daily when her TSH was too low and free T4 was too high) but she takes in the morning with breakfast. She reports taking iron as directed and reports some constipation with it but has been using Colace and PRN Miralax with relief. She is requesting a refill on her Tramadol but being that she sees Dr. Andrade for her pain management, I suggested she contact him. Denies CP, SOB, wheezing, nausea, vomiting, diarrhea, numbness, dizziness, weakness, palpitations, fevers, or any other concerns at this time.    Diabetes   She presents for her follow-up diabetic visit. She has type 2 diabetes mellitus. Her disease course has been improving. There are no hypoglycemic associated symptoms. Pertinent negatives for hypoglycemia include no confusion, dizziness, headaches, nervousness/anxiousness, seizures or sweats. There are no diabetic associated symptoms. Pertinent negatives for diabetes include no  blurred vision, no chest pain, no fatigue and no weakness. There are no hypoglycemic complications. Symptoms are improving. Diabetic complications include peripheral neuropathy. Pertinent negatives for diabetic complications include no autonomic neuropathy, CVA, heart disease, impotence, nephropathy, PVD or retinopathy. Risk factors for coronary artery disease include diabetes mellitus, dyslipidemia, obesity, post-menopausal and hypertension. Current diabetic treatment includes oral agent (monotherapy) (Trulicity). She is compliant with treatment all of the time. Her weight is fluctuating minimally. She is following a diabetic and generally healthy diet. Meal planning includes avoidance of concentrated sweets. She has not had a previous visit with a dietitian. She participates in exercise daily. She monitors blood glucose at home 1-2 x per day. Blood glucose monitoring compliance is excellent. Her home blood glucose trend is decreasing steadily. Her breakfast blood glucose is taken between 6-7 am. Her breakfast blood glucose range is generally 110-130 mg/dl. An ACE inhibitor/angiotensin II receptor blocker is being taken. She does not see a podiatrist.Eye exam is not current.   Hypertension   This is a chronic problem. The current episode started more than 1 year ago. The problem is unchanged. The problem is controlled. Pertinent negatives include no anxiety, blurred vision, chest pain, headaches, malaise/fatigue, neck pain, orthopnea, palpitations, peripheral edema, PND, shortness of breath or sweats. Agents associated with hypertension include thyroid hormones and NSAIDs. Risk factors for coronary artery disease include diabetes mellitus, dyslipidemia, family history, obesity and post-menopausal state. Past treatments include ACE inhibitors. The current treatment provides moderate improvement. There are no compliance problems.  There is no history of angina, kidney disease, CAD/MI, CVA, heart failure, left  ventricular hypertrophy, PVD or retinopathy. Identifiable causes of hypertension include a thyroid problem. There is no history of chronic renal disease, coarctation of the aorta, hyperaldosteronism, hypercortisolism, hyperparathyroidism, a hypertension causing med, pheochromocytoma, renovascular disease or sleep apnea.       Past Surgical History:   Procedure Laterality Date     SECTION      LAPAROSCOPIC SALPINGO-OOPHORECTOMY Bilateral 7/3/2019    Procedure: SALPINGO-OOPHORECTOMY, LAPAROSCOPIC;  Surgeon: Darin Hale MD;  Location: [facility];  Service: OB/GYN;  Laterality: Bilateral;    LAPAROSCOPIC TOTAL HYSTERECTOMY N/A 7/3/2019    Procedure: HYSTERECTOMY, TOTAL, LAPAROSCOPIC;  Surgeon: aDrin Hale MD;  Location: [facility];  Service: OB/GYN;  Laterality: N/A;    OPEN REDUCTION AND INTERNAL FIXATION (ORIF) OF INJURY OF ANKLE Left     THYROIDECTOMY, PARTIAL      TUBAL LIGATION       Family History   Problem Relation Age of Onset    Cancer Mother     Hypertension Mother     Diabetes Mother     Arthritis Mother     Heart disease Mother     Kidney disease Mother     Cancer Father       Social History     Socioeconomic History    Marital status: Single     Spouse name: Not on file    Number of children: Not on file    Years of education: Not on file    Highest education level: Not on file   Occupational History    Not on file   Social Needs    Financial resource strain: Not on file    Food insecurity:     Worry: Not on file     Inability: Not on file    Transportation needs:     Medical: Not on file     Non-medical: Not on file   Tobacco Use    Smoking status: Never Smoker    Smokeless tobacco: Never Used   Substance and Sexual Activity    Alcohol use: No    Drug use: No    Sexual activity: Never   Lifestyle    Physical activity:     Days per week: Not on file     Minutes per session: Not on file    Stress: Not at all   Relationships    Social connections:     Talks on phone:  Not on file     Gets together: Not on file     Attends Islam service: Not on file     Active member of club or organization: Not on file     Attends meetings of clubs or organizations: Not on file     Relationship status: Not on file   Other Topics Concern    Not on file   Social History Narrative    Not on file     Current Outpatient Medications   Medication Sig Dispense Refill    atorvastatin (LIPITOR) 10 MG tablet Take 1 tablet (10 mg total) by mouth once daily. 90 tablet 1    blood sugar diagnostic (TRUE METRIX GLUCOSE TEST STRIP MISC) True Metrix Glucose Test Strip   Use twice daily as directed      celecoxib (CELEBREX) 200 MG capsule Take 1 capsule (200 mg total) by mouth 2 (two) times daily. 60 capsule 0    cyclobenzaprine (FLEXERIL) 10 MG tablet       docusate sodium (COLACE) 100 MG capsule Take 1 capsule (100 mg total) by mouth once daily. 30 capsule 3    ferrous sulfate (FEOSOL) 325 mg (65 mg iron) Tab tablet Take 1 tablet (325 mg total) by mouth daily with breakfast. 90 tablet 1    gabapentin (NEURONTIN) 300 MG capsule Take 1 capsule (300 mg total) by mouth 3 (three) times daily. 90 capsule 11    ibuprofen (ADVIL,MOTRIN) 800 MG tablet Take 1 tablet (800 mg total) by mouth every 8 (eight) hours as needed for Pain. 90 tablet 2    levothyroxine (SYNTHROID) 200 MCG tablet Take 1 tablet (200 mcg total) by mouth before breakfast. 30 tablet 2    lisinopriL (PRINIVIL,ZESTRIL) 40 MG tablet Take 1 tablet by mouth once daily 30 tablet 4    metFORMIN (GLUCOPHAGE-XR) 500 MG XR 24hr tablet Take 2 tablets (1,000 mg total) by mouth daily with breakfast. 60 tablet 3    naproxen (NAPROSYN) 500 MG tablet Take 1 tablet (500 mg total) by mouth 2 (two) times daily as needed (headache). Take with food. 30 tablet 1    nystatin (MYCOSTATIN) ointment Apply topically 2 (two) times daily. Apply to affected areas only. 15 g 0    sertraline (ZOLOFT) 50 MG tablet Take 1 tablet (50 mg total) by mouth once daily. 30  "tablet 1    traMADol (ULTRAM) 50 mg tablet       [START ON 2020] liraglutide 0.6 mg/0.1 mL, 18 mg/3 mL, subq PNIJ (VICTOZA 2-YU) 0.6 mg/0.1 mL (18 mg/3 mL) PnIj Inject 0.6 mg into the skin once daily for 7 days, THEN 1.2 mg once daily for 23 days. 3 Syringe 3    pen needle, diabetic 32 gauge x 1/4" Ndle 1 Device by Misc.(Non-Drug; Combo Route) route once daily. 90 each 1     No current facility-administered medications for this visit.      Facility-Administered Medications Ordered in Other Visits   Medication Dose Route Frequency Provider Last Rate Last Dose    lidocaine (PF) 10 mg/ml (1%) injection 2 mg  0.2 mL Intradermal Once Brenda CASSIDY Hauser MD         Review of patient's allergies indicates:   Allergen Reactions    Trulicity [dulaglutide] Rash      Past Medical History:   Diagnosis Date    Abdominal pain     RIGHT MID ABD    Anemia     Anxiety     Arthritis     Complex cyst of right ovary 2019    Diabetes mellitus     Diabetes mellitus, type 2     Encounter for blood transfusion     2    Encounter for well woman exam with routine gynecological exam 2019    Hypertension     Menorrhagia with regular cycle 2019    Obesity     Ovarian cyst     right    Problem involving surgical incision 2019    Sinus congestion     Thyroid disease      Past Surgical History:   Procedure Laterality Date     SECTION      LAPAROSCOPIC SALPINGO-OOPHORECTOMY Bilateral 7/3/2019    Procedure: SALPINGO-OOPHORECTOMY, LAPAROSCOPIC;  Surgeon: Darin Hale MD;  Location: [facility] OR;  Service: OB/GYN;  Laterality: Bilateral;    LAPAROSCOPIC TOTAL HYSTERECTOMY N/A 7/3/2019    Procedure: HYSTERECTOMY, TOTAL, LAPAROSCOPIC;  Surgeon: Darin Hale MD;  Location: [facility] OR;  Service: OB/GYN;  Laterality: N/A;    OPEN REDUCTION AND INTERNAL FIXATION (ORIF) OF INJURY OF ANKLE Left     THYROIDECTOMY, PARTIAL      TUBAL LIGATION         Review of Systems   Constitutional: Negative for " "activity change, appetite change, chills, fatigue, fever, malaise/fatigue and unexpected weight change.   HENT: Negative for congestion, ear pain, postnasal drip, rhinorrhea, sinus pressure, sinus pain, sneezing and sore throat.    Eyes: Negative for blurred vision, pain, discharge and visual disturbance.   Respiratory: Negative for cough, chest tightness, shortness of breath and wheezing.    Cardiovascular: Negative for chest pain, palpitations, orthopnea, leg swelling and PND.   Gastrointestinal: Negative for abdominal distention, abdominal pain, blood in stool, constipation, diarrhea, nausea and vomiting.   Genitourinary: Negative for difficulty urinating, flank pain, frequency, hematuria, impotence, pelvic pain, urgency and vaginal discharge.   Musculoskeletal: Positive for arthralgias (R shoulder pain). Negative for back pain, joint swelling, myalgias and neck pain.   Skin: Negative for color change, rash and wound.   Neurological: Negative for dizziness, seizures, syncope, weakness, light-headedness, numbness and headaches.   Hematological: Negative for adenopathy.   Psychiatric/Behavioral: Negative for agitation, confusion, hallucinations, sleep disturbance and suicidal ideas. The patient is not nervous/anxious.       OBJECTIVE:      Vitals:    04/08/20 0826 04/08/20 0907   BP: (!) 154/92 120/88   BP Location: Right arm Right arm   Patient Position: Sitting Sitting   BP Method: Large (Manual)    Pulse: 86    Resp: 16    Temp: 98.7 °F (37.1 °C)    TempSrc: Oral    SpO2: 98%    Weight: 108.6 kg (239 lb 8 oz)    Height: 5' 6" (1.676 m)      Physical Exam   Constitutional: She is oriented to person, place, and time. Vital signs are normal. She appears well-developed and well-nourished. No distress.   HENT:   Head: Normocephalic and atraumatic.   Right Ear: Hearing, tympanic membrane, external ear and ear canal normal.   Left Ear: Hearing, tympanic membrane, external ear and ear canal normal.   Nose: Nose normal. " No mucosal edema or rhinorrhea.   Mouth/Throat: Uvula is midline, oropharynx is clear and moist and mucous membranes are normal. No oropharyngeal exudate, posterior oropharyngeal edema or posterior oropharyngeal erythema.   Eyes: Pupils are equal, round, and reactive to light. Conjunctivae, EOM and lids are normal. Right eye exhibits no discharge. Left eye exhibits no discharge. No scleral icterus.   Neck: Trachea normal, normal range of motion, full passive range of motion without pain and phonation normal. Neck supple. Carotid bruit is not present. No tracheal deviation present. No thyromegaly present.   Cardiovascular: Normal rate, regular rhythm, normal heart sounds, intact distal pulses and normal pulses. Exam reveals no gallop and no friction rub.   No murmur heard.  Pulmonary/Chest: Effort normal and breath sounds normal. No stridor. No respiratory distress. She has no decreased breath sounds. She has no wheezes. She has no rhonchi. She has no rales.   Abdominal: Soft. Normal appearance and bowel sounds are normal. There is no tenderness.   Musculoskeletal: Normal range of motion. She exhibits no edema.   Lymphadenopathy:     She has no cervical adenopathy.        Right: No supraclavicular adenopathy present.        Left: No supraclavicular adenopathy present.   Neurological: She is alert and oriented to person, place, and time.   Skin: Skin is warm, dry and intact. Capillary refill takes less than 2 seconds. No rash noted. She is not diaphoretic.   Psychiatric: She has a normal mood and affect. Her speech is normal and behavior is normal. Judgment and thought content normal. Cognition and memory are normal. She expresses no suicidal plans.   Vitals reviewed.     Assessment:       1. Type 2 diabetes mellitus with diabetic polyneuropathy, without long-term current use of insulin    2. Hypothyroidism, unspecified type    3. Iron deficiency anemia, unspecified iron deficiency anemia type    4. Right shoulder  "pain, unspecified chronicity        Plan:       Type 2 diabetes mellitus with diabetic polyneuropathy, without long-term current use of insulin  Switching medication from Trulicity to Victoza daily. Last Trulicity injection was yesterday so instructed to start Victoza next Tuesday 4/14/2020. Continue Metformin as prescribed. Risks of DM discussed, like CAD, CVD, retinopathy, nephropathy, neuropathy. Types of treatment discussed. Encouraged exercise and diet. Target A1C Goal of < 7.0 reviewed. Stay away from sweets and high carb foods such as pasta, rice, bread, etc. Discussed lifestyle modifications: low glycemic index diet, exercise (30-45 min) daily, and weight loss. Will adjust medication to achieve optimal glucose and cholesterol control. Discussed need for annual eye exam, seasonal flu vaccine seasonally, and routine inspection of feet. Bring glucose diary to each visit.  -     liraglutide 0.6 mg/0.1 mL, 18 mg/3 mL, subq PNIJ (VICTOZA 2-YU) 0.6 mg/0.1 mL (18 mg/3 mL) PnIj; Inject 0.6 mg into the skin once daily for 7 days, THEN 1.2 mg once daily for 23 days.  Dispense: 3 Syringe; Refill: 3  -     pen needle, diabetic 32 gauge x 1/4" Ndle; 1 Device by Misc.(Non-Drug; Combo Route) route once daily.  Dispense: 90 each; Refill: 1    Hypothyroidism, unspecified type  Continue on the 200mcg dosage as prescribed. Will check labs in approx 1 month to assess levels. She will call if refills are due prior to next visit. F/U in 2 months.  -     TSH; Future; Expected date: 05/04/2020  -     T4, free; Future; Expected date: 05/04/2020    Iron deficiency anemia, unspecified iron deficiency anemia type  Continue regimen of iron daily since pt can only tolerate once daily dosing. Will check labs in approx 1 month to assess levels. Possible referral to heme-onc if levels don't improve. F/U in 2 months.  -     CBC auto differential; Future; Expected date: 05/04/2020  -     Ferritin; Future; Expected date: 05/04/2020  -     Iron " and TIBC; Future; Expected date: 05/04/2020    Right shoulder pain, unspecified chronicity  Continue care as directed by Dr. Andrade. She will call and request refill on her Tramadol from him. Continue PT exercises at home as directed until PT reopens.      Follow up in about 2 months (around 6/8/2020) for F/U DM, thyroid.      4/8/2020 Emily Timmons, DIANA, FNP

## 2020-04-08 NOTE — TELEPHONE ENCOUNTER
.The following medication needs a prior authorization:     Medication Name: Victoza     Dosage: 18mg/3ml    Frequency: daily    Directions for use: Inject 0.6mg subcutaneously once daily for 7 days then 1.2 mg once daily for 23 days     Diagnosis: Type 2 diabetes mellitus with diabetic polyneuropathy E11.42    Is the request for a reauthorization? No    Is the patient currently stable on therapy? New RX    Please list all therapeutic alternatives previously used with start/end dates and outcome: Trulicity not effective, Metformin not effective alone Current A1c noted at 10.2 as of 3/19/2020

## 2020-04-08 NOTE — PATIENT INSTRUCTIONS
"  Exercise to Manage Your Blood Sugar    Being physically active every day can help you manage your blood sugar. Thats because an active lifestyle can improve your bodys ability to use insulin. Daily activity can also help delay or prevent complications of diabetes. And its a great way to relieve stress. If you arent normally active, be sure to consult your healthcare provider before getting started.  How much activity do you need?  If daily activity is new to you, start slow and steady. Begin with 10 minutes of activity each day. Then work up to at least 150 minutes a week of physical activity. Don't let more than 2 days go by without being active. When sitting for long periods of time, get up for short sessions of light activity every 30 minutes  Just move!  You dont have to join a gym or own pricey sports equipment. Just get out and walk. Walking is an aerobic exercise that makes your heart and lungs work hard. It helps your heart and blood vessels. Walking needs only a sturdy pair of sneakers and your own two feet. The more you walk, the easier it gets:  · Schedule time every day to move your feet.  · Make it part of your daily routine.  · Walk with a friend or a group to keep it interesting and fun.  · Try taking several short walks during the day to meet your daily activity goal.  A pedometer makes every step count  A pedometer is a small device that keeps track of how many steps you take. You can clip it to your belt (or a strap on your arm or leg) and go about your daily routine. "Smartphones" now also have apps to record your walking. At the end of the day, the pedometer shows the total number of steps you took. Use a pedometer to set daily goals for yourself. For instance, if you walk 4,000 steps a day, try adding 200 more steps each day. Aim for a goal of 7,500. With every step, youre doing a little more to help your body use insulin.   Adding resistance exercise  Resistance exercise (also called " strength training), makes muscles stronger. It also helps muscles use insulin better. Ask your healthcare provider whether this type of exercise is right for you. If it is, your healthcare provider can help you work it in to your activity plan.  Staying safe  Being active may cause blood sugar to drop faster than usual. This is especially true if you take medicine to manage your blood sugar. But there are things you can do to help reduce the risk of accidental lows. Keep these tips in mind:  · Always carry identification when you exercise outside your home. Carry a cell phone to use in case of emergency.  · If you can, include friends and family in your activities.  · Wear a medical ID bracelet that says you have diabetes.  · Use the right safety equipment for the activity you do (such as a bicycle helmet when you ride a bicycle outdoors). Wear closed-toed shoes that fit your feet well.  · Drink plenty of water before and during activity.  · Keep a fast-acting sugar (such as glucose tablets) on hand in case of low blood sugar.  · Dress properly for the weather. Wear a hat if its ariel, or wait until evening if its too hot.  · Avoid being active for long periods in very hot or very cold weather.  · Skip activity if youre sick.     Notice how activity affects blood sugar  Physical activity is important when you have diabetes. But you need to keep an eye on your blood sugar level. Check often if you have been active for longer than usual, or if the activity was unplanned. Make it a habit to check your blood sugar before being active. And check again a few hours later. Use your log book to write down how activity affects your numbers. If you take insulin, you may be able to adjust your dose before a planned activity. This can help prevent lows. You may also need to take a small carbohydrate snack before the exercise. Talk to your healthcare provider to learn more.    Date Last Reviewed: 6/1/2016  © 3585-7845 [employer]. [address] [place] [address], [place] [address]. All rights reserved. This information is not intended as a substitute for professional medical care. Always follow your healthcare professional's instructions.

## 2020-04-14 DIAGNOSIS — M75.01 ADHESIVE CAPSULITIS OF RIGHT SHOULDER: ICD-10-CM

## 2020-04-15 RX ORDER — CELECOXIB 200 MG/1
CAPSULE ORAL
Qty: 60 CAPSULE | Refills: 5 | Status: SHIPPED | OUTPATIENT
Start: 2020-04-15 | End: 2020-06-16 | Stop reason: SDUPTHER

## 2020-04-20 ENCOUNTER — HOSPITAL ENCOUNTER (EMERGENCY)
Facility: HOSPITAL | Age: 45
Discharge: HOME | End: 2020-04-20
Attending: EMERGENCY MEDICINE
Payer: COMMERCIAL

## 2020-04-20 VITALS
RESPIRATION RATE: 22 BRPM | SYSTOLIC BLOOD PRESSURE: 155 MMHG | BODY MASS INDEX: 37.77 KG/M2 | DIASTOLIC BLOOD PRESSURE: 71 MMHG | WEIGHT: 235 LBS | OXYGEN SATURATION: 99 % | HEIGHT: 66 IN | TEMPERATURE: 99 F | HEART RATE: 86 BPM

## 2020-04-20 DIAGNOSIS — R07.89 CHEST WALL PAIN: ICD-10-CM

## 2020-04-20 DIAGNOSIS — M25.512 LEFT SHOULDER PAIN: ICD-10-CM
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
## 2020-04-20 LAB — GLUCOSE SERPL-MCNC: 235 MG/DL (ref 70–110)

## 2020-04-20 PROCEDURE — 94799 UNLISTED PULMONARY SVC/PX: CPT | Mod: 76

## 2020-04-20 PROCEDURE — 93005 ELECTROCARDIOGRAM TRACING: CPT | Performed by: INTERNAL MEDICINE

## 2020-04-20 PROCEDURE — 25000003 PHARM REV CODE 250: Performed by: EMERGENCY MEDICINE

## 2020-04-20 PROCEDURE — 99900035 HC TECH TIME PER 15 MIN (STAT)

## 2020-04-20 PROCEDURE — 99285 EMERGENCY DEPT VISIT HI MDM: CPT | Mod: 25

## 2020-04-20 PROCEDURE — 82962 GLUCOSE BLOOD TEST: CPT

## 2020-04-20 RX ORDER — HYDROCODONE BITARTRATE AND ACETAMINOPHEN 5; 325 MG/1; MG/1
1 TABLET ORAL EVERY 4 HOURS PRN
Qty: 6 TABLET | Refills: 0 | Status: SHIPPED | OUTPATIENT
Start: 2020-04-20 | End: 2020-04-23 | Stop reason: DRUGHIGH

## 2020-04-20 RX ORDER — HYDROCODONE BITARTRATE AND ACETAMINOPHEN 5; 325 MG/1; MG/1
1 TABLET ORAL
Status: COMPLETED | OUTPATIENT
Start: 2020-04-20 | End: 2020-04-20

## 2020-04-20 RX ADMIN — HYDROCODONE BITARTRATE AND ACETAMINOPHEN 1 TABLET: 5; 325 TABLET ORAL at 08:04
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
## 2020-04-23 ENCOUNTER — OFFICE VISIT (OUTPATIENT)
Dept: FAMILY MEDICINE | Facility: CLINIC | Age: 45
End: 2020-04-23
Payer: COMMERCIAL

## 2020-04-23 VITALS
BODY MASS INDEX: 38.41 KG/M2 | RESPIRATION RATE: 17 BRPM | HEART RATE: 94 BPM | HEIGHT: 66 IN | SYSTOLIC BLOOD PRESSURE: 158 MMHG | TEMPERATURE: 98 F | OXYGEN SATURATION: 98 % | DIASTOLIC BLOOD PRESSURE: 98 MMHG | WEIGHT: 239 LBS

## 2020-04-23 DIAGNOSIS — S20.212D RIB CONTUSION, LEFT, SUBSEQUENT ENCOUNTER: Primary | ICD-10-CM

## 2020-04-23 PROCEDURE — 3077F PR MOST RECENT SYSTOLIC BLOOD PRESSURE >= 140 MM HG: ICD-10-PCS | Mod: S$GLB,,, | Performed by: NURSE PRACTITIONER

## 2020-04-23 PROCEDURE — 99213 OFFICE O/P EST LOW 20 MIN: CPT | Mod: S$GLB,,, | Performed by: NURSE PRACTITIONER

## 2020-04-23 PROCEDURE — 3008F PR BODY MASS INDEX (BMI) DOCUMENTED: ICD-10-PCS | Mod: S$GLB,,, | Performed by: NURSE PRACTITIONER

## 2020-04-23 PROCEDURE — 99213 PR OFFICE/OUTPT VISIT, EST, LEVL III, 20-29 MIN: ICD-10-PCS | Mod: S$GLB,,, | Performed by: NURSE PRACTITIONER

## 2020-04-23 PROCEDURE — 3080F PR MOST RECENT DIASTOLIC BLOOD PRESSURE >= 90 MM HG: ICD-10-PCS | Mod: S$GLB,,, | Performed by: NURSE PRACTITIONER

## 2020-04-23 PROCEDURE — 3008F BODY MASS INDEX DOCD: CPT | Mod: S$GLB,,, | Performed by: NURSE PRACTITIONER

## 2020-04-23 PROCEDURE — 3077F SYST BP >= 140 MM HG: CPT | Mod: S$GLB,,, | Performed by: NURSE PRACTITIONER

## 2020-04-23 PROCEDURE — 3080F DIAST BP >= 90 MM HG: CPT | Mod: S$GLB,,, | Performed by: NURSE PRACTITIONER

## 2020-04-23 RX ORDER — HYDROCODONE BITARTRATE AND ACETAMINOPHEN 7.5; 325 MG/1; MG/1
1 TABLET ORAL EVERY 6 HOURS PRN
Qty: 21 TABLET | Refills: 0 | Status: SHIPPED | OUTPATIENT
Start: 2020-04-23 | End: 2020-04-30

## 2020-04-23 NOTE — LETTER
April 23, 2020      [facility]  [address]  [facility] [address]  Phone: [phone]  Fax: [phone]       Patient: Haydee Allen   YOB: 1975  Date of Visit: 04/23/2020    To Whom It May Concern:    Marco Allen  was at [facility] on 04/23/2020. She may return to work on 4/23/2020 with restrictions- no heavy lifting, light duty for 6 weeks. If you have any questions or concerns, or if I can be of further assistance, please do not hesitate to contact me.    Sincerely,        MICHAEL Ley

## 2020-04-23 NOTE — PATIENT INSTRUCTIONS
Rib Contusion or Minor Fracture    A rib contusion is a bruise to one or more rib bones. It may cause pain, tenderness, swelling, and a purplish tint to the skin. There may be a sharp pain with each breath. A rib contusion takes anywhere from a few days to a few weeks to heal. A minor rib fracture or break may cause the same symptoms as a rib contusion. The small crack may not be seen on a regular chest X-ray. Treatment for both problems is the same.  Home care  · You may use over-the-counter pain medicine to control pain, unless another pain medicine was prescribed. If you have chronic liver or kidney disease or ever had a stomach ulcer or GI bleeding, talk with your healthcare provider before using these medicines.  · Rest. Do not lift anything heavy or do any activity that causes pain.  · Apply an ice pack over the injured area for 15 to 20 minutes every 1 to 2 hours. You should do this for the first 24 to 48 hours. You can make an ice pack by filling a plastic bag that seals at the top with ice cubes and then wrapping it with a thin towel. Continue with ice packs as needed for the relief of pain and swelling.  · The first 3 to 4 weeks of healing will be the most painful. If your pain is not under control with the treatment given, call your healthcare provider. Sometimes a stronger pain medicine may be needed. A nerve block can be done in case of severe pain. It will numb the nerve between the ribs.  Follow-up care  Follow up with your healthcare provider, or as advised.  If X-rays were taken, you will be told of any new findings that may affect your care.  Call 911  Call 911 if you have:  · Dizziness, weakness or fainting  · Shortness of breath with or without chest discomfort  · New or worsening pain  When to seek medical advice  Call your healthcare provider right away if any of these occur:  · Fever of 100.4°F (38°C) or above lasting for 24 to 48 hours  · Stomach pain  Date Last Reviewed: 12/2/2015  ©  1029-4527 [employer]. [address] [place] [address], [place] [address]. All rights reserved. This information is not intended as a substitute for professional medical care. Always follow your healthcare professional's instructions.

## 2020-06-04 ENCOUNTER — TELEPHONE (OUTPATIENT)
Dept: FAMILY MEDICINE | Facility: CLINIC | Age: 45
End: 2020-06-04

## 2020-06-05 ENCOUNTER — TELEPHONE (OUTPATIENT)
Dept: FAMILY MEDICINE | Facility: CLINIC | Age: 45
End: 2020-06-05

## 2020-06-14 LAB
BASOPHILS # BLD AUTO: 70 CELLS/UL (ref 0–200)
BASOPHILS NFR BLD AUTO: 1 %
EOSINOPHIL # BLD AUTO: 140 CELLS/UL (ref 15–500)
EOSINOPHIL NFR BLD AUTO: 2 %
ERYTHROCYTE [DISTWIDTH] IN BLOOD BY AUTOMATED COUNT: 16.6 % (ref 11–15)
FERRITIN SERPL-MCNC: 5 NG/ML (ref 16–232)
HCT VFR BLD AUTO: 31.3 % (ref 35–45)
HGB BLD-MCNC: 8.2 G/DL (ref 11.7–15.5)
IRON SATN MFR SERPL: 4 % (CALC) (ref 16–45)
IRON SERPL-MCNC: 15 MCG/DL (ref 40–190)
LYMPHOCYTES # BLD AUTO: 2156 CELLS/UL (ref 850–3900)
LYMPHOCYTES NFR BLD AUTO: 30.8 %
MCH RBC QN AUTO: 19.1 PG (ref 27–33)
MCHC RBC AUTO-ENTMCNC: 26.2 G/DL (ref 32–36)
MCV RBC AUTO: 73 FL (ref 80–100)
MONOCYTES # BLD AUTO: 399 CELLS/UL (ref 200–950)
MONOCYTES NFR BLD AUTO: 5.7 %
NEUTROPHILS # BLD AUTO: 4235 CELLS/UL (ref 1500–7800)
NEUTROPHILS NFR BLD AUTO: 60.5 %
PLATELET # BLD AUTO: 286 THOUSAND/UL (ref 140–400)
PMV BLD REES-ECKER: ABNORMAL FL (ref 7.5–12.5)
RBC # BLD AUTO: 4.29 MILLION/UL (ref 3.8–5.1)
T4 FREE SERPL-MCNC: 1.1 NG/DL (ref 0.8–1.8)
TIBC SERPL-MCNC: 344 MCG/DL (CALC) (ref 250–450)
TSH SERPL-ACNC: 2.99 MIU/L
WBC # BLD AUTO: 7 THOUSAND/UL (ref 3.8–10.8)

## 2020-06-16 ENCOUNTER — OFFICE VISIT (OUTPATIENT)
Dept: FAMILY MEDICINE | Facility: CLINIC | Age: 45
End: 2020-06-16
Payer: COMMERCIAL

## 2020-06-16 VITALS
OXYGEN SATURATION: 99 % | DIASTOLIC BLOOD PRESSURE: 90 MMHG | WEIGHT: 239.63 LBS | RESPIRATION RATE: 18 BRPM | HEART RATE: 92 BPM | SYSTOLIC BLOOD PRESSURE: 150 MMHG | BODY MASS INDEX: 38.51 KG/M2 | HEIGHT: 66 IN | TEMPERATURE: 99 F

## 2020-06-16 DIAGNOSIS — M75.01 ADHESIVE CAPSULITIS OF RIGHT SHOULDER: ICD-10-CM

## 2020-06-16 DIAGNOSIS — E11.42 TYPE 2 DIABETES MELLITUS WITH DIABETIC POLYNEUROPATHY, WITHOUT LONG-TERM CURRENT USE OF INSULIN: Primary | ICD-10-CM

## 2020-06-16 DIAGNOSIS — I10 ESSENTIAL HYPERTENSION: ICD-10-CM

## 2020-06-16 DIAGNOSIS — E03.9 ACQUIRED HYPOTHYROIDISM: ICD-10-CM

## 2020-06-16 DIAGNOSIS — D50.9 IRON DEFICIENCY ANEMIA, UNSPECIFIED IRON DEFICIENCY ANEMIA TYPE: ICD-10-CM

## 2020-06-16 LAB — HBA1C MFR BLD: 10.5 %

## 2020-06-16 PROCEDURE — 83036 HEMOGLOBIN GLYCOSYLATED A1C: CPT | Mod: QW,S$GLB,, | Performed by: NURSE PRACTITIONER

## 2020-06-16 PROCEDURE — 3080F PR MOST RECENT DIASTOLIC BLOOD PRESSURE >= 90 MM HG: ICD-10-PCS | Mod: S$GLB,,, | Performed by: NURSE PRACTITIONER

## 2020-06-16 PROCEDURE — 83036 POCT HEMOGLOBIN A1C: ICD-10-PCS | Mod: QW,S$GLB,, | Performed by: NURSE PRACTITIONER

## 2020-06-16 PROCEDURE — 99214 OFFICE O/P EST MOD 30 MIN: CPT | Mod: S$GLB,,, | Performed by: NURSE PRACTITIONER

## 2020-06-16 PROCEDURE — 3080F DIAST BP >= 90 MM HG: CPT | Mod: S$GLB,,, | Performed by: NURSE PRACTITIONER

## 2020-06-16 PROCEDURE — 3046F PR MOST RECENT HEMOGLOBIN A1C LEVEL > 9.0%: ICD-10-PCS | Mod: S$GLB,,, | Performed by: NURSE PRACTITIONER

## 2020-06-16 PROCEDURE — 3077F PR MOST RECENT SYSTOLIC BLOOD PRESSURE >= 140 MM HG: ICD-10-PCS | Mod: S$GLB,,, | Performed by: NURSE PRACTITIONER

## 2020-06-16 PROCEDURE — 3008F PR BODY MASS INDEX (BMI) DOCUMENTED: ICD-10-PCS | Mod: S$GLB,,, | Performed by: NURSE PRACTITIONER

## 2020-06-16 PROCEDURE — 3046F HEMOGLOBIN A1C LEVEL >9.0%: CPT | Mod: S$GLB,,, | Performed by: NURSE PRACTITIONER

## 2020-06-16 PROCEDURE — 3077F SYST BP >= 140 MM HG: CPT | Mod: S$GLB,,, | Performed by: NURSE PRACTITIONER

## 2020-06-16 PROCEDURE — 3008F BODY MASS INDEX DOCD: CPT | Mod: S$GLB,,, | Performed by: NURSE PRACTITIONER

## 2020-06-16 PROCEDURE — 99214 PR OFFICE/OUTPT VISIT, EST, LEVL IV, 30-39 MIN: ICD-10-PCS | Mod: S$GLB,,, | Performed by: NURSE PRACTITIONER

## 2020-06-16 RX ORDER — CELECOXIB 200 MG/1
200 CAPSULE ORAL 2 TIMES DAILY
Qty: 60 CAPSULE | Refills: 0 | Status: SHIPPED | OUTPATIENT
Start: 2020-06-16 | End: 2020-09-23 | Stop reason: SDUPTHER

## 2020-06-16 RX ORDER — LEVOTHYROXINE SODIUM 200 UG/1
200 TABLET ORAL
Qty: 90 TABLET | Refills: 1 | Status: SHIPPED | OUTPATIENT
Start: 2020-06-16 | End: 2020-09-23 | Stop reason: SDUPTHER

## 2020-06-16 RX ORDER — METFORMIN HYDROCHLORIDE 500 MG/1
1000 TABLET, EXTENDED RELEASE ORAL
Qty: 180 TABLET | Refills: 1 | Status: SHIPPED | OUTPATIENT
Start: 2020-06-16 | End: 2020-09-23 | Stop reason: SDUPTHER

## 2020-06-16 RX ORDER — AMLODIPINE BESYLATE 5 MG/1
5 TABLET ORAL DAILY
Qty: 30 TABLET | Refills: 1 | Status: SHIPPED | OUTPATIENT
Start: 2020-06-16 | End: 2020-07-09 | Stop reason: SDUPTHER

## 2020-06-16 RX ORDER — GLIPIZIDE 5 MG/1
5 TABLET, FILM COATED, EXTENDED RELEASE ORAL
Qty: 90 TABLET | Refills: 1 | Status: SHIPPED | OUTPATIENT
Start: 2020-06-16 | End: 2020-09-23 | Stop reason: SDUPTHER

## 2020-06-16 RX ORDER — LISINOPRIL 40 MG/1
40 TABLET ORAL DAILY
Qty: 90 TABLET | Refills: 1 | Status: SHIPPED | OUTPATIENT
Start: 2020-06-16 | End: 2020-07-09 | Stop reason: SDUPTHER

## 2020-06-17 ENCOUNTER — TELEPHONE (OUTPATIENT)
Dept: FAMILY MEDICINE | Facility: CLINIC | Age: 45
End: 2020-06-17

## 2020-06-17 NOTE — TELEPHONE ENCOUNTER
.The following medication needs a prior authorization:     Medication Name: Victoza     Dosage: 1.8mg     Frequency: Daily     Directions for use: Inject 1.8mg into skin once daily ( Subcutaneous)    Diagnosis: Type 2 diabetes melliyus with diabetic polyneuropathy, without long-term current use of insulin ( E11.42)    Is the request for a reauthorization? yes    Is the patient currently stable on therapy? yes    Please list all therapeutic alternatives previously used with start/end dates and outcome: Victoza 04/14/2020- present( stable on meds);

## 2020-06-17 NOTE — TELEPHONE ENCOUNTER
.The following medication needs a prior authorization:     Medication Name: Celecoxib ( Celebrex )    Dosage: 200mg    Frequency: 2 times daily     Directions for use: Take 1 capsule by mouth 2 times a day     Diagnosis: Adhesive capsulitis right shoulder ( M75.01 )    Is the request for a reauthorization? Yes    Is the patient currently stable on therapy? Yes    Please list all therapeutic alternatives previously used with start/end dates and outcome: Gabapentin 300mg 12/30/2019- present ( not effective alone);

## 2020-06-18 NOTE — PROGRESS NOTES
SUBJECTIVE:      Patient ID: Haydee Allen is a 45 y.o. female.    Chief Complaint: Follow-up (lab results )    Patient presents for diabetes, hypothyroidism, hypertension.  Reports compliance with her medications.  She reports compliance with her monitoring regimen.  Her blood pressure is uncontrolled today.  She reports having increased stressors related to her children.  HPI as below.  She exercises daily and reports she is watching her diet.  She reports she continues to have pain to her right shoulder and needs refills on her medication as she has been unable to get in with Dr. Andrade.  She continues to be intolerant of her iron supplementation and continues with iron deficiency anemia.  She reports in the past having to get blood transfusions.  Denies CP, SOB, wheezing, fevers, nausea, vomiting, diarrhea, constipation, numbness, weakness, dizziness, palpitations, or any other concerns at this time.    Hypertension  This is a chronic problem. The current episode started more than 1 year ago. The problem has been waxing and waning since onset. The problem is uncontrolled. Pertinent negatives include no blurred vision, chest pain, headaches, malaise/fatigue, neck pain, orthopnea, palpitations, peripheral edema, PND, shortness of breath or sweats. Agents associated with hypertension include thyroid hormones. Risk factors for coronary artery disease include diabetes mellitus, obesity, stress, post-menopausal state and dyslipidemia. Past treatments include ACE inhibitors. The current treatment provides mild improvement. There are no compliance problems.  There is no history of angina, kidney disease, CAD/MI, CVA, heart failure, left ventricular hypertrophy, PVD or retinopathy. There is no history of chronic renal disease, coarctation of the aorta, hyperaldosteronism, hypercortisolism, hyperparathyroidism, a hypertension causing med, pheochromocytoma, renovascular disease, sleep apnea or a thyroid problem.    Diabetes  She presents for her follow-up diabetic visit. She has type 2 diabetes mellitus. Hypoglycemia symptoms include nervousness/anxiousness. Pertinent negatives for hypoglycemia include no confusion, dizziness, headaches, seizures, sweats or tremors. There are no diabetic associated symptoms. Pertinent negatives for diabetes include no blurred vision, no chest pain, no fatigue, no visual change, no weakness and no weight loss. There are no hypoglycemic complications. Symptoms are worsening. There are no diabetic complications. Pertinent negatives for diabetic complications include no CVA, PVD or retinopathy. Risk factors for coronary artery disease include diabetes mellitus, dyslipidemia, obesity, hypertension, post-menopausal and sedentary lifestyle. Current diabetic treatment includes oral agent (monotherapy) and insulin injections. She is compliant with treatment all of the time. Rotation sites for injection include the abdominal wall. Her weight is fluctuating minimally. She is following a diabetic diet. Meal planning includes avoidance of concentrated sweets. She has not had a previous visit with a dietitian. She participates in exercise daily. She monitors blood glucose at home 1-2 x per day. Blood glucose monitoring compliance is fair. Her home blood glucose trend is decreasing steadily. Her breakfast blood glucose range is generally 140-180 mg/dl. An ACE inhibitor/angiotensin II receptor blocker is being taken. She does not see a podiatrist.Eye exam is current.   Thyroid Problem  Presents for follow-up visit. Symptoms include anxiety. Patient reports no cold intolerance, constipation, depressed mood, diaphoresis, diarrhea, dry skin, fatigue, hair loss, heat intolerance, hoarse voice, leg swelling, menstrual problem, nail problem, palpitations, tremors, visual change, weight gain or weight loss. The symptoms have been stable. There is no history of heart failure.       Past Surgical History:    Procedure Laterality Date     SECTION      LAPAROSCOPIC SALPINGO-OOPHORECTOMY Bilateral 7/3/2019    Procedure: SALPINGO-OOPHORECTOMY, LAPAROSCOPIC;  Surgeon: Darin Hale MD;  Location: [facility];  Service: OB/GYN;  Laterality: Bilateral;    LAPAROSCOPIC TOTAL HYSTERECTOMY N/A 7/3/2019    Procedure: HYSTERECTOMY, TOTAL, LAPAROSCOPIC;  Surgeon: Darin Hale MD;  Location: [facility] OR;  Service: OB/GYN;  Laterality: N/A;    OPEN REDUCTION AND INTERNAL FIXATION (ORIF) OF INJURY OF ANKLE Left     THYROIDECTOMY, PARTIAL      TUBAL LIGATION       Family History   Problem Relation Age of Onset    Cancer Mother     Hypertension Mother     Diabetes Mother     Arthritis Mother     Heart disease Mother     Kidney disease Mother     Cancer Father       Social History     Socioeconomic History    Marital status: Single     Spouse name: Not on file    Number of children: Not on file    Years of education: Not on file    Highest education level: Not on file   Occupational History    Not on file   Social Needs    Financial resource strain: Not on file    Food insecurity     Worry: Not on file     Inability: Not on file    Transportation needs     Medical: Not on file     Non-medical: Not on file   Tobacco Use    Smoking status: Never Smoker    Smokeless tobacco: Never Used   Substance and Sexual Activity    Alcohol use: No    Drug use: No    Sexual activity: Never   Lifestyle    Physical activity     Days per week: Not on file     Minutes per session: Not on file    Stress: Not at all   Relationships    Social connections     Talks on phone: Not on file     Gets together: Not on file     Attends Mandaeism service: Not on file     Active member of club or organization: Not on file     Attends meetings of clubs or organizations: Not on file     Relationship status: Not on file   Other Topics Concern    Not on file   Social History Narrative    Not on file     Current Outpatient Medications  "  Medication Sig Dispense Refill    atorvastatin (LIPITOR) 10 MG tablet Take 1 tablet (10 mg total) by mouth once daily. 90 tablet 1    blood sugar diagnostic (TRUE METRIX GLUCOSE TEST STRIP MISC) True Metrix Glucose Test Strip   Use twice daily as directed      celecoxib (CELEBREX) 200 MG capsule Take 1 capsule (200 mg total) by mouth 2 (two) times daily. 60 capsule 0    gabapentin (NEURONTIN) 300 MG capsule Take 1 capsule (300 mg total) by mouth 3 (three) times daily. 90 capsule 11    ibuprofen (ADVIL,MOTRIN) 800 MG tablet Take 1 tablet (800 mg total) by mouth every 8 (eight) hours as needed for Pain. 90 tablet 2    levothyroxine (SYNTHROID) 200 MCG tablet Take 1 tablet (200 mcg total) by mouth before breakfast. 90 tablet 1    lisinopriL (PRINIVIL,ZESTRIL) 40 MG tablet Take 1 tablet (40 mg total) by mouth once daily. 90 tablet 1    metFORMIN (GLUCOPHAGE-XR) 500 MG XR 24hr tablet Take 2 tablets (1,000 mg total) by mouth daily with breakfast. 180 tablet 1    nystatin (MYCOSTATIN) ointment Apply topically 2 (two) times daily. Apply to affected areas only. 15 g 0    pen needle, diabetic 32 gauge x 1/4" Ndle 1 Device by Misc.(Non-Drug; Combo Route) route once daily. 90 each 1    amLODIPine (NORVASC) 5 MG tablet Take 1 tablet (5 mg total) by mouth once daily. 30 tablet 1    glipiZIDE (GLUCOTROL) 5 MG TR24 Take 1 tablet (5 mg total) by mouth daily with breakfast. 90 tablet 1    liraglutide 0.6 mg/0.1 mL, 18 mg/3 mL, subq PNIJ (VICTOZA 2-YU) 0.6 mg/0.1 mL (18 mg/3 mL) PnIj Inject 1.8 mg into the skin once daily. 4 Syringe 3     No current facility-administered medications for this visit.      Facility-Administered Medications Ordered in Other Visits   Medication Dose Route Frequency Provider Last Rate Last Dose    lidocaine (PF) 10 mg/ml (1%) injection 2 mg  0.2 mL Intradermal Once Brenda Hauser MD         Review of patient's allergies indicates:   Allergen Reactions    Trulicity [dulaglutide] Rash    "   Past Medical History:   Diagnosis Date    Abdominal pain     RIGHT MID ABD    Anemia     Anxiety     Arthritis     Complex cyst of right ovary 2019    Diabetes mellitus     Diabetes mellitus, type 2     Encounter for blood transfusion     2    Encounter for well woman exam with routine gynecological exam 2019    Hypertension     Menorrhagia with regular cycle 2019    Obesity     Ovarian cyst     right    Problem involving surgical incision 2019    Sinus congestion     Thyroid disease      Past Surgical History:   Procedure Laterality Date     SECTION      LAPAROSCOPIC SALPINGO-OOPHORECTOMY Bilateral 7/3/2019    Procedure: SALPINGO-OOPHORECTOMY, LAPAROSCOPIC;  Surgeon: Darin Hale MD;  Location: [facility] OR;  Service: OB/GYN;  Laterality: Bilateral;    LAPAROSCOPIC TOTAL HYSTERECTOMY N/A 7/3/2019    Procedure: HYSTERECTOMY, TOTAL, LAPAROSCOPIC;  Surgeon: Darin Hale MD;  Location: [facility] OR;  Service: OB/GYN;  Laterality: N/A;    OPEN REDUCTION AND INTERNAL FIXATION (ORIF) OF INJURY OF ANKLE Left     THYROIDECTOMY, PARTIAL      TUBAL LIGATION         Review of Systems   Constitutional: Negative for activity change, appetite change, chills, diaphoresis, fatigue, fever, malaise/fatigue, unexpected weight change, weight gain and weight loss.   HENT: Negative for congestion, ear pain, hoarse voice, postnasal drip, rhinorrhea, sinus pressure, sinus pain, sneezing and sore throat.    Eyes: Negative for blurred vision, pain, discharge and visual disturbance.   Respiratory: Negative for cough, chest tightness, shortness of breath and wheezing.    Cardiovascular: Negative for chest pain, palpitations, orthopnea, leg swelling and PND.   Gastrointestinal: Negative for abdominal distention, abdominal pain, blood in stool, constipation, diarrhea, nausea and vomiting.   Endocrine: Negative for cold intolerance and heat intolerance.   Genitourinary: Negative for difficulty  "urinating, flank pain, frequency, hematuria, menstrual problem, pelvic pain, urgency and vaginal discharge.   Musculoskeletal: Negative for back pain, joint swelling, myalgias and neck pain.   Skin: Negative for color change, rash and wound.   Neurological: Negative for dizziness, tremors, seizures, syncope, weakness, light-headedness, numbness and headaches.   Hematological: Negative for adenopathy.   Psychiatric/Behavioral: Negative for agitation, confusion, hallucinations, sleep disturbance and suicidal ideas. The patient is nervous/anxious.       OBJECTIVE:      Vitals:    06/16/20 1521 06/16/20 1544   BP: (!) 158/90 (!) 150/90   BP Location: Left arm Left arm   Patient Position: Sitting Sitting   BP Method: Large (Manual)    Pulse: 92    Resp: 18    Temp: 98.5 °F (36.9 °C)    TempSrc: Oral    SpO2: 99%    Weight: 108.7 kg (239 lb 9.6 oz)    Height: 5' 6" (1.676 m)      Physical Exam  Vitals signs reviewed.   Constitutional:       General: She is not in acute distress.     Appearance: Normal appearance. She is well-developed. She is obese. She is not diaphoretic.   HENT:      Head: Normocephalic and atraumatic.      Right Ear: Hearing, tympanic membrane, ear canal and external ear normal.      Left Ear: Hearing, tympanic membrane, ear canal and external ear normal.      Nose: Nose normal. No mucosal edema, congestion or rhinorrhea.      Mouth/Throat:      Lips: Pink.      Mouth: Mucous membranes are moist.      Pharynx: Oropharynx is clear. Uvula midline. No pharyngeal swelling, oropharyngeal exudate or posterior oropharyngeal erythema.   Eyes:      General: Lids are normal. No scleral icterus.        Right eye: No discharge.         Left eye: No discharge.      Extraocular Movements: Extraocular movements intact.      Conjunctiva/sclera: Conjunctivae normal.      Pupils: Pupils are equal, round, and reactive to light.   Neck:      Musculoskeletal: Full passive range of motion without pain, normal range of " motion and neck supple.      Vascular: No carotid bruit.      Trachea: Trachea and phonation normal. No tracheal deviation.   Cardiovascular:      Rate and Rhythm: Normal rate and regular rhythm.      Pulses: Normal pulses.      Heart sounds: Normal heart sounds. No murmur. No friction rub. No gallop.    Pulmonary:      Effort: Pulmonary effort is normal. No respiratory distress.      Breath sounds: Normal breath sounds. No stridor. No decreased breath sounds, wheezing, rhonchi or rales.   Abdominal:      General: Bowel sounds are normal.      Palpations: Abdomen is soft.      Tenderness: There is no abdominal tenderness.   Musculoskeletal: Normal range of motion.      Right lower leg: No edema.      Left lower leg: No edema.   Lymphadenopathy:      Cervical: No cervical adenopathy.      Upper Body:      Right upper body: No supraclavicular adenopathy.      Left upper body: No supraclavicular adenopathy.   Skin:     General: Skin is warm and dry.      Capillary Refill: Capillary refill takes less than 2 seconds.      Findings: No rash.   Neurological:      General: No focal deficit present.      Mental Status: She is alert and oriented to person, place, and time.      GCS: GCS eye subscore is 4. GCS verbal subscore is 5. GCS motor subscore is 6.      Coordination: Coordination is intact.      Gait: Gait is intact.   Psychiatric:         Attention and Perception: Attention and perception normal.         Mood and Affect: Mood and affect normal.         Speech: Speech normal.         Behavior: Behavior normal. Behavior is cooperative.         Thought Content: Thought content normal. Thought content does not include suicidal plan.         Cognition and Memory: Cognition and memory normal.         Judgment: Judgment normal.        Assessment:       1. Type 2 diabetes mellitus with diabetic polyneuropathy, without long-term current use of insulin    2. Essential hypertension    3. Iron deficiency anemia, unspecified iron  deficiency anemia type    4. Acquired hypothyroidism    5. Adhesive capsulitis of right shoulder        Plan:       Type 2 diabetes mellitus with diabetic polyneuropathy, without long-term current use of insulin  Hemoglobin A1c continues to be uncontrolled at 10.5.  Continuing Victoza but increasing to 1.8 mg daily.  Continuing metformin as previously prescribed.  Adding glipizide once daily.  Discussed the possibility of hypoglycemia with this medication.  Instructed on signs and symptoms of hypoglycemia.  Verbalized understanding.  Encouraged to follow diabetic diet, check blood sugars daily fasting and keep a log.  -     Hemoglobin A1C, POCT  -     liraglutide 0.6 mg/0.1 mL, 18 mg/3 mL, subq PNIJ (VICTOZA 2-YU) 0.6 mg/0.1 mL (18 mg/3 mL) PnIj; Inject 1.8 mg into the skin once daily.  Dispense: 4 Syringe; Refill: 3  -     glipiZIDE (GLUCOTROL) 5 MG TR24; Take 1 tablet (5 mg total) by mouth daily with breakfast.  Dispense: 90 tablet; Refill: 1  -     metFORMIN (GLUCOPHAGE-XR) 500 MG XR 24hr tablet; Take 2 tablets (1,000 mg total) by mouth daily with breakfast.  Dispense: 180 tablet; Refill: 1    Essential hypertension  Blood pressure is uncontrolled at this visit. Continue lisinopril 40 mg daily.  Adding amlodipine 5 mg daily.  Follow-up in 1 month for blood pressure check.  -     amLODIPine (NORVASC) 5 MG tablet; Take 1 tablet (5 mg total) by mouth once daily.  Dispense: 30 tablet; Refill: 1  -     lisinopriL (PRINIVIL,ZESTRIL) 40 MG tablet; Take 1 tablet (40 mg total) by mouth once daily.  Dispense: 90 tablet; Refill: 1    Iron deficiency anemia, unspecified iron deficiency anemia type  Sending referral to hematology oncology.  She has been unable to tolerate oral supplementation and continues with iron deficiency anemia.  Possible need for iron infusions.  -     Ambulatory referral/consult to Hematology / Oncology; Future; Expected date: 06/23/2020    Acquired hypothyroidism  Diagnosis is stable on current  regimen. Continue current medications. Refills given as previously prescribed.  -     levothyroxine (SYNTHROID) 200 MCG tablet; Take 1 tablet (200 mcg total) by mouth before breakfast.  Dispense: 90 tablet; Refill: 1    Adhesive capsulitis of right shoulder  Refills given as previously prescribed by Dr. Andrade.  Instructed to make a follow-up appointment with him.  -     celecoxib (CELEBREX) 200 MG capsule; Take 1 capsule (200 mg total) by mouth 2 (two) times daily.  Dispense: 60 capsule; Refill: 0        Follow up in about 4 weeks (around 7/14/2020) for F/U HTN.      6/18/2020 Emily Timmons, DIANA, FNP

## 2020-06-18 NOTE — PATIENT INSTRUCTIONS
Established High Blood Pressure    High blood pressure (hypertension) is a chronic disease. Often, healthcare providers dont know what causes it. But it can be caused by certain health conditions and medicines.  If you have high blood pressure, you may not have any symptoms. If you do have symptoms, they may include headache, dizziness, changes in your vision, chest pain, and shortness of breath. But even without symptoms, high blood pressure thats not treated raises your risk for heart attack and stroke. High blood pressure is a serious health risk and shouldnt be ignored.  A blood pressure reading is made up of two numbers: a higher number over a lower number. The top number is the systolic pressure. The bottom number is the diastolic pressure. A normal blood pressure is a systolic pressure of  less than 120 over a diastolic pressure of less than 80. You will see your blood pressure readings written together. For example, a person with a systolic pressure of 188 and a diastolic pressure of 78 will have 118/78 written in the medical record.  High blood pressure is when either the top number is 140 or higher, or the bottom number is 90 or higher. This must be the result when taking your blood pressure a number of times. The blood pressures between normal and high are called prehypertension.  Home care  If you have high blood pressure, you should do what is listed below to lower your blood pressure. If you are taking medicines for high blood pressure, these methods may reduce or end your need for medicines in the future.  · Begin a weight-loss program if you are overweight.  · Cut back on how much salt you get in your diet. Heres how to do this:  ¨ Dont eat foods that have a lot of salt. These include olives, pickles, smoked meats, and salted potato chips.  ¨ Dont add salt to your food at the table.  ¨ Use only small amounts of salt when cooking.  · Start an exercise program. Talk with your healthcare  provider about the type of exercise program that would be best for you. It doesn't have to be hard. Even brisk walking for 20 minutes 3 times a week is a good form of exercise.  · Dont take medicines that stimulate the heart. This includes many over-the-counter cold and sinus decongestant pills and sprays, as well as diet pills. Check the warnings about hypertension on the label. Before buying any over-the-counter medicines or supplements, always ask the pharmacist about the product's potential interaction with your high blood pressure and your high blood pressure medicines.  · Stimulants such as amphetamine or cocaine could be deadly for someone with high blood pressure. Never take these.  · Limit how much caffeine you get in your diet. Switch to caffeine-free products.  · Stop smoking. If you are a long-time smoker, this can be hard. Talk to your healthcare provider about medicines and nicotine replacement options to help you. Also, enroll in a stop-smoking program to make it more likely that you will quit for good.  · Learn how to handle stress. This is an important part of any program to lower blood pressure. Learn about relaxation methods like meditation, yoga, or biofeedback.  · If your provider prescribed medicines, take them exactly as directed. Missing doses may cause your blood pressure get out of control.  · If you miss a dose or doses, check with your healthcare provider or pharmacist about what to do.  · Consider buying an automatic blood pressure machine. Ask your provider for a recommendation. You can get one of these at most pharmacies.     The American Heart Association recommends the following guidelines for home blood pressure monitoring:  · Don't smoke or drink coffee for 30 minutes before taking your blood pressure.  · Go to the bathroom before the test.  · Relax for 5 minutes before taking the measurement.  · Sit with your back supported (don't sit on a couch or soft chair); keep your feet on  the floor uncrossed. Place your arm on a solid flat surface (like a table) with the upper part of the arm at heart level. Place the middle of the cuff directly above the eye of the elbow. Check the monitor's instruction manual for an illustration.  · Take multiple readings. When you measure, take 2 to 3 readings one minute apart and record all of the results.  · Take your blood pressure at the same time every day, or as your healthcare provider recommends.  · Record the date, time, and blood pressure reading.  · Take the record with you to your next medical appointment. If your blood pressure monitor has a built-in memory, simply take the monitor with you to your next appointment.  · Call your provider if you have several high readings. Don't be frightened by a single high blood pressure reading, but if you get several high readings, check in with your healthcare provider.  · Note: When blood pressure reaches a systolic (top number) of 180 or higher OR diastolic (bottom number) of 110 or higher, seek emergency medical treatment.  Follow-up care  You will need to see your healthcare provider regularly. This is to check your blood pressure and to make changes to your medicines. Make a follow-up appointment as directed. Bring the record of your home blood pressure readings to the appointment.  When to seek medical advice  Call your healthcare provider right away if any of these occur:  · Blood pressure reaches a systolic (upper number) of 180 or higher OR a diastolic (bottom number) of 110 or higher  · Chest pain or shortness of breath  · Severe headache  · Throbbing or rushing sound in the ears  · Nosebleed  · Sudden severe pain in your belly (abdomen)  · Extreme drowsiness, confusion, or fainting  · Dizziness or spinning sensation (vertigo)  · Weakness of an arm or leg or one side of the face  · You have problems speaking or seeing   Date Last Reviewed: 12/1/2016  © 8347-9710 [employer]. [address] [place] [address]  [place] [address]. All rights reserved. This information is not intended as a substitute for professional medical care. Always follow your healthcare professional's instructions.

## 2020-06-23 ENCOUNTER — OCCUPATIONAL HEALTH (OUTPATIENT)
Dept: URGENT CARE | Facility: CLINIC | Age: 45
End: 2020-06-23

## 2020-06-23 PROCEDURE — 86580 TB INTRADERMAL TEST: CPT | Mod: S$GLB,,, | Performed by: EMERGENCY MEDICINE

## 2020-06-23 PROCEDURE — 86580 PR  TB INTRADERMAL TEST: ICD-10-PCS | Mod: S$GLB,,, | Performed by: EMERGENCY MEDICINE

## 2020-06-25 PROBLEM — N92.0 MENORRHAGIA WITH REGULAR CYCLE: Status: ACTIVE | Noted: 2020-06-25

## 2020-06-25 PROBLEM — D50.0 ANEMIA DUE TO CHRONIC BLOOD LOSS: Status: ACTIVE | Noted: 2020-06-25

## 2020-06-25 PROBLEM — D50.9 MICROCYTIC ANEMIA: Status: ACTIVE | Noted: 2020-06-25

## 2020-07-09 ENCOUNTER — OFFICE VISIT (OUTPATIENT)
Dept: FAMILY MEDICINE | Facility: CLINIC | Age: 45
End: 2020-07-09
Payer: COMMERCIAL

## 2020-07-09 VITALS
HEIGHT: 66 IN | RESPIRATION RATE: 18 BRPM | DIASTOLIC BLOOD PRESSURE: 78 MMHG | OXYGEN SATURATION: 97 % | BODY MASS INDEX: 38.62 KG/M2 | TEMPERATURE: 99 F | WEIGHT: 240.31 LBS | SYSTOLIC BLOOD PRESSURE: 133 MMHG | HEART RATE: 96 BPM

## 2020-07-09 DIAGNOSIS — I10 ESSENTIAL HYPERTENSION: ICD-10-CM

## 2020-07-09 DIAGNOSIS — E11.42 TYPE 2 DIABETES MELLITUS WITH DIABETIC POLYNEUROPATHY, WITHOUT LONG-TERM CURRENT USE OF INSULIN: ICD-10-CM

## 2020-07-09 DIAGNOSIS — H65.191 OTHER NON-RECURRENT ACUTE NONSUPPURATIVE OTITIS MEDIA OF RIGHT EAR: Primary | ICD-10-CM

## 2020-07-09 PROCEDURE — 3008F PR BODY MASS INDEX (BMI) DOCUMENTED: ICD-10-PCS | Mod: S$GLB,,, | Performed by: NURSE PRACTITIONER

## 2020-07-09 PROCEDURE — 99214 PR OFFICE/OUTPT VISIT, EST, LEVL IV, 30-39 MIN: ICD-10-PCS | Mod: S$GLB,,, | Performed by: NURSE PRACTITIONER

## 2020-07-09 PROCEDURE — 3046F HEMOGLOBIN A1C LEVEL >9.0%: CPT | Mod: S$GLB,,, | Performed by: NURSE PRACTITIONER

## 2020-07-09 PROCEDURE — 3078F DIAST BP <80 MM HG: CPT | Mod: S$GLB,,, | Performed by: NURSE PRACTITIONER

## 2020-07-09 PROCEDURE — 3078F PR MOST RECENT DIASTOLIC BLOOD PRESSURE < 80 MM HG: ICD-10-PCS | Mod: S$GLB,,, | Performed by: NURSE PRACTITIONER

## 2020-07-09 PROCEDURE — 3008F BODY MASS INDEX DOCD: CPT | Mod: S$GLB,,, | Performed by: NURSE PRACTITIONER

## 2020-07-09 PROCEDURE — 3075F PR MOST RECENT SYSTOLIC BLOOD PRESS GE 130-139MM HG: ICD-10-PCS | Mod: S$GLB,,, | Performed by: NURSE PRACTITIONER

## 2020-07-09 PROCEDURE — 99214 OFFICE O/P EST MOD 30 MIN: CPT | Mod: S$GLB,,, | Performed by: NURSE PRACTITIONER

## 2020-07-09 PROCEDURE — 3046F PR MOST RECENT HEMOGLOBIN A1C LEVEL > 9.0%: ICD-10-PCS | Mod: S$GLB,,, | Performed by: NURSE PRACTITIONER

## 2020-07-09 PROCEDURE — 3075F SYST BP GE 130 - 139MM HG: CPT | Mod: S$GLB,,, | Performed by: NURSE PRACTITIONER

## 2020-07-09 RX ORDER — LISINOPRIL 40 MG/1
40 TABLET ORAL DAILY
Qty: 90 TABLET | Refills: 1 | Status: SHIPPED | OUTPATIENT
Start: 2020-07-09 | End: 2020-09-23 | Stop reason: SDUPTHER

## 2020-07-09 RX ORDER — AMOXICILLIN AND CLAVULANATE POTASSIUM 875; 125 MG/1; MG/1
1 TABLET, FILM COATED ORAL 2 TIMES DAILY
Qty: 14 TABLET | Refills: 0 | Status: SHIPPED | OUTPATIENT
Start: 2020-07-09 | End: 2020-07-16

## 2020-07-09 RX ORDER — IBUPROFEN 800 MG/1
800 TABLET ORAL EVERY 8 HOURS PRN
Qty: 30 TABLET | Refills: 0 | Status: SHIPPED | OUTPATIENT
Start: 2020-07-09 | End: 2020-08-24

## 2020-07-09 RX ORDER — AMLODIPINE BESYLATE 5 MG/1
5 TABLET ORAL DAILY
Qty: 90 TABLET | Refills: 1 | Status: SHIPPED | OUTPATIENT
Start: 2020-07-09 | End: 2020-09-23

## 2020-07-09 NOTE — PROGRESS NOTES
SUBJECTIVE:      Patient ID: Haydee Allen is a 45 y.o. female.    Chief Complaint: Mass (lump behind right ear x1 day )    Pt presents with two nodules to the R side of her head. One nodule is occipital to the back of the head and the other is postauricular. Reports she noticed them yesterday. She recently had tight gabriel in her hair but took them out once she noticed the nodules. She reports pain to her jaw and face on that side. However, she reports she has no teeth on that side. Otherwise she is doing well. She reports compliance with her DMII and HTN medications. She reports she has been checking her blood glucose daily and it is running in the 120s. She has been exercising in her neighborhood daily by walking laps five times and has been watching her diet. She stopped drinking cold drinks and has been drinking water and Crystal Light. She does report increased stress with some issues with her son today. Denies CP, SOB, wheezing, fevers, nausea, vomiting, diarrhea, constipation, numbness, weakness, dizziness, palpitations, or any other concerns at this time.    Mass  This is a new problem. The current episode started yesterday. The problem occurs constantly. The problem has been unchanged. Associated symptoms include swollen glands. Pertinent negatives include no abdominal pain, anorexia, arthralgias, change in bowel habit, chest pain, chills, congestion, coughing, diaphoresis, fatigue, fever, headaches, joint swelling, myalgias, nausea, neck pain, numbness, rash, sore throat, urinary symptoms, vertigo, visual change, vomiting or weakness. Nothing aggravates the symptoms. She has tried nothing for the symptoms.   Hypertension  This is a chronic problem. The current episode started more than 1 year ago. The problem is controlled. Associated symptoms include anxiety. Pertinent negatives include no blurred vision, chest pain, headaches, malaise/fatigue, neck pain, orthopnea, palpitations, peripheral  edema, PND, shortness of breath or sweats. Agents associated with hypertension include thyroid hormones and NSAIDs. Risk factors for coronary artery disease include stress, obesity, post-menopausal state, family history and diabetes mellitus. Past treatments include calcium channel blockers, ACE inhibitors and lifestyle changes. The current treatment provides moderate improvement. There are no compliance problems.  There is no history of angina, kidney disease, CAD/MI, CVA, heart failure, left ventricular hypertrophy, PVD or retinopathy. Identifiable causes of hypertension include a thyroid problem. There is no history of chronic renal disease, coarctation of the aorta, hyperaldosteronism, hypercortisolism, hyperparathyroidism, a hypertension causing med, pheochromocytoma, renovascular disease or sleep apnea.       Past Surgical History:   Procedure Laterality Date     SECTION      LAPAROSCOPIC SALPINGO-OOPHORECTOMY Bilateral 7/3/2019    Procedure: SALPINGO-OOPHORECTOMY, LAPAROSCOPIC;  Surgeon: Darin Hale MD;  Location: [facility] OR;  Service: OB/GYN;  Laterality: Bilateral;    LAPAROSCOPIC TOTAL HYSTERECTOMY N/A 7/3/2019    Procedure: HYSTERECTOMY, TOTAL, LAPAROSCOPIC;  Surgeon: Darin Hale MD;  Location: [facility] OR;  Service: OB/GYN;  Laterality: N/A;    OPEN REDUCTION AND INTERNAL FIXATION (ORIF) OF INJURY OF ANKLE Left     THYROIDECTOMY, PARTIAL      TUBAL LIGATION       Family History   Problem Relation Age of Onset    Cancer Mother     Hypertension Mother     Diabetes Mother     Arthritis Mother     Heart disease Mother     Kidney disease Mother     Cancer Father       Social History     Socioeconomic History    Marital status: Single     Spouse name: Not on file    Number of children: Not on file    Years of education: Not on file    Highest education level: Not on file   Occupational History    Not on file   Social Needs    Financial resource strain: Not on file    Food  insecurity     Worry: Not on file     Inability: Not on file    Transportation needs     Medical: Not on file     Non-medical: Not on file   Tobacco Use    Smoking status: Never Smoker    Smokeless tobacco: Never Used   Substance and Sexual Activity    Alcohol use: No    Drug use: No    Sexual activity: Never   Lifestyle    Physical activity     Days per week: Not on file     Minutes per session: Not on file    Stress: Not at all   Relationships    Social connections     Talks on phone: Not on file     Gets together: Not on file     Attends Shinto service: Not on file     Active member of club or organization: Not on file     Attends meetings of clubs or organizations: Not on file     Relationship status: Not on file   Other Topics Concern    Not on file   Social History Narrative    Not on file     Current Outpatient Medications   Medication Sig Dispense Refill    amLODIPine (NORVASC) 5 MG tablet Take 1 tablet (5 mg total) by mouth once daily. 90 tablet 1    atorvastatin (LIPITOR) 10 MG tablet Take 1 tablet (10 mg total) by mouth once daily. 90 tablet 1    blood sugar diagnostic (TRUE METRIX GLUCOSE TEST STRIP MISC) True Metrix Glucose Test Strip   Use twice daily as directed      gabapentin (NEURONTIN) 300 MG capsule Take 1 capsule (300 mg total) by mouth 3 (three) times daily. 90 capsule 11    glipiZIDE (GLUCOTROL) 5 MG TR24 Take 1 tablet (5 mg total) by mouth daily with breakfast. 90 tablet 1    ibuprofen (ADVIL,MOTRIN) 800 MG tablet Take 1 tablet (800 mg total) by mouth every 8 (eight) hours as needed for Pain. Take with food. 30 tablet 0    levothyroxine (SYNTHROID) 200 MCG tablet Take 1 tablet (200 mcg total) by mouth before breakfast. 90 tablet 1    liraglutide 0.6 mg/0.1 mL, 18 mg/3 mL, subq PNIJ (VICTOZA 2-YU) 0.6 mg/0.1 mL (18 mg/3 mL) PnIj pen Inject 1.8 mg into the skin once daily. 4 pen 3    lisinopriL (PRINIVIL,ZESTRIL) 40 MG tablet Take 1 tablet (40 mg total) by mouth once  "daily. 90 tablet 1    metFORMIN (GLUCOPHAGE-XR) 500 MG XR 24hr tablet Take 2 tablets (1,000 mg total) by mouth daily with breakfast. 180 tablet 1    nystatin (MYCOSTATIN) ointment Apply topically 2 (two) times daily. Apply to affected areas only. 15 g 0    pen needle, diabetic 32 gauge x 1/4" Ndle 1 Device by Misc.(Non-Drug; Combo Route) route once daily. 90 each 1    amoxicillin-clavulanate 875-125mg (AUGMENTIN) 875-125 mg per tablet Take 1 tablet by mouth 2 (two) times daily. for 7 days 14 tablet 0    celecoxib (CELEBREX) 200 MG capsule Take 1 capsule (200 mg total) by mouth 2 (two) times daily. (Patient not taking: Reported on 2020) 60 capsule 0     No current facility-administered medications for this visit.      Facility-Administered Medications Ordered in Other Visits   Medication Dose Route Frequency Provider Last Rate Last Dose    lidocaine (PF) 10 mg/ml (1%) injection 2 mg  0.2 mL Intradermal Once Brenda Hauser MD         Review of patient's allergies indicates:   Allergen Reactions    Trulicity [dulaglutide] Rash      Past Medical History:   Diagnosis Date    Abdominal pain     RIGHT MID ABD    Anemia     Anemia due to chronic blood loss 2020    Anxiety     Arthritis     Complex cyst of right ovary 2019    Diabetes mellitus     Diabetes mellitus, type 2     Encounter for blood transfusion     2    Encounter for well woman exam with routine gynecological exam 2019    Hypertension     Menorrhagia with regular cycle 2019    Menorrhagia with regular cycle 2020    Microcytic anemia 2020    Obesity     Ovarian cyst     right    Problem involving surgical incision 2019    Sinus congestion     Thyroid disease      Past Surgical History:   Procedure Laterality Date     SECTION      LAPAROSCOPIC SALPINGO-OOPHORECTOMY Bilateral 7/3/2019    Procedure: SALPINGO-OOPHORECTOMY, LAPAROSCOPIC;  Surgeon: Darin Hale MD;  Location: [facility] OR;  " Service: OB/GYN;  Laterality: Bilateral;    LAPAROSCOPIC TOTAL HYSTERECTOMY N/A 7/3/2019    Procedure: HYSTERECTOMY, TOTAL, LAPAROSCOPIC;  Surgeon: Darin Hale MD;  Location: [facility];  Service: OB/GYN;  Laterality: N/A;    OPEN REDUCTION AND INTERNAL FIXATION (ORIF) OF INJURY OF ANKLE Left     THYROIDECTOMY, PARTIAL      TUBAL LIGATION         Review of Systems   Constitutional: Negative for activity change, appetite change, chills, diaphoresis, fatigue, fever, malaise/fatigue and unexpected weight change.   HENT: Negative for congestion, ear pain, postnasal drip, rhinorrhea, sinus pressure, sinus pain, sneezing and sore throat.    Eyes: Negative for blurred vision, pain, discharge and visual disturbance.   Respiratory: Negative for cough, chest tightness, shortness of breath and wheezing.    Cardiovascular: Negative for chest pain, palpitations, orthopnea, leg swelling and PND.   Gastrointestinal: Negative for abdominal distention, abdominal pain, anorexia, blood in stool, change in bowel habit, constipation, diarrhea, nausea and vomiting.   Genitourinary: Negative for difficulty urinating, flank pain, frequency, hematuria, pelvic pain, urgency and vaginal discharge.   Musculoskeletal: Negative for arthralgias, back pain, joint swelling, myalgias and neck pain.   Skin: Negative for color change, rash and wound.   Neurological: Negative for dizziness, vertigo, seizures, syncope, weakness, light-headedness, numbness and headaches.   Hematological: Positive for adenopathy.   Psychiatric/Behavioral: Negative for agitation, confusion, hallucinations, sleep disturbance and suicidal ideas. The patient is not nervous/anxious.       OBJECTIVE:      Vitals:    07/09/20 1445 07/09/20 1502   BP: 138/82 133/78   BP Location: Left arm Left arm   Patient Position: Sitting Sitting   BP Method: Large (Manual)    Pulse: 96    Resp: 18    Temp: 98.5 °F (36.9 °C)    TempSrc: Oral    SpO2: 97%    Weight: 109 kg (240 lb 4.8  "oz)    Height: 5' 6" (1.676 m)      Physical Exam  Vitals signs reviewed.   Constitutional:       General: She is not in acute distress.     Appearance: Normal appearance. She is well-developed. She is obese. She is not diaphoretic.   HENT:      Head: Normocephalic and atraumatic.      Jaw: No tenderness, swelling or pain on movement.      Salivary Glands: Right salivary gland is not diffusely enlarged or tender. Left salivary gland is not diffusely enlarged or tender.      Right Ear: Hearing, ear canal and external ear normal. Tenderness present. A middle ear effusion is present. Tympanic membrane is erythematous and bulging. Tympanic membrane is not scarred, perforated or retracted.      Left Ear: Hearing, tympanic membrane, ear canal and external ear normal.      Nose: Nose normal. No mucosal edema, congestion or rhinorrhea.      Mouth/Throat:      Lips: Pink.      Mouth: Mucous membranes are moist.      Dentition: Abnormal dentition. No dental abscesses.      Pharynx: Oropharynx is clear. Uvula midline. No pharyngeal swelling, oropharyngeal exudate or posterior oropharyngeal erythema.   Eyes:      General: Lids are normal. No scleral icterus.        Right eye: No discharge.         Left eye: No discharge.      Extraocular Movements: Extraocular movements intact.      Conjunctiva/sclera: Conjunctivae normal.      Pupils: Pupils are equal, round, and reactive to light.   Neck:      Musculoskeletal: Full passive range of motion without pain, normal range of motion and neck supple.      Thyroid: No thyroid mass or thyromegaly.      Vascular: No carotid bruit.      Trachea: Trachea and phonation normal. No tracheal deviation.   Cardiovascular:      Rate and Rhythm: Normal rate and regular rhythm.      Pulses: Normal pulses.      Heart sounds: Normal heart sounds. No murmur. No friction rub. No gallop.    Pulmonary:      Effort: Pulmonary effort is normal. No respiratory distress.      Breath sounds: Normal breath " sounds. No stridor. No decreased breath sounds, wheezing, rhonchi or rales.   Abdominal:      General: Bowel sounds are normal.      Palpations: Abdomen is soft.      Tenderness: There is no abdominal tenderness.   Musculoskeletal: Normal range of motion.      Right lower leg: No edema.      Left lower leg: No edema.   Lymphadenopathy:      Head:      Right side of head: Posterior auricular and occipital adenopathy present. No submental, submandibular, tonsillar or preauricular adenopathy.      Left side of head: No submental, submandibular, tonsillar, preauricular, posterior auricular or occipital adenopathy.      Cervical: No cervical adenopathy.      Right cervical: No superficial, deep or posterior cervical adenopathy.     Left cervical: No superficial, deep or posterior cervical adenopathy.      Upper Body:      Right upper body: No supraclavicular adenopathy.      Left upper body: No supraclavicular adenopathy.   Skin:     General: Skin is warm and dry.      Capillary Refill: Capillary refill takes less than 2 seconds.      Findings: No rash.   Neurological:      General: No focal deficit present.      Mental Status: She is alert and oriented to person, place, and time.      GCS: GCS eye subscore is 4. GCS verbal subscore is 5. GCS motor subscore is 6.      Coordination: Coordination is intact.      Gait: Gait is intact.   Psychiatric:         Attention and Perception: Attention and perception normal.         Mood and Affect: Mood and affect normal.         Speech: Speech normal.         Behavior: Behavior normal. Behavior is cooperative.         Thought Content: Thought content normal. Thought content does not include suicidal plan.         Cognition and Memory: Cognition and memory normal.         Judgment: Judgment normal.        Assessment:       1. Other non-recurrent acute nonsuppurative otitis media of right ear    2. Type 2 diabetes mellitus with diabetic polyneuropathy, without long-term current use  of insulin    3. Essential hypertension        Plan:       Other non-recurrent acute nonsuppurative otitis media of right ear  AOM noted to R ear. Augmentin BID x 7 days prescribed. Ibuprofen for pain relief. Instructed to take with food. This could be the source of the lymphadenopathy vs tight gabriel. F/U if symptoms worsen or persist.  -     amoxicillin-clavulanate 875-125mg (AUGMENTIN) 875-125 mg per tablet; Take 1 tablet by mouth 2 (two) times daily. for 7 days  Dispense: 14 tablet; Refill: 0  -     ibuprofen (ADVIL,MOTRIN) 800 MG tablet; Take 1 tablet (800 mg total) by mouth every 8 (eight) hours as needed for Pain. Take with food.  Dispense: 30 tablet; Refill: 0    Type 2 diabetes mellitus with diabetic polyneuropathy, without long-term current use of insulin  HgbA1c completed in June 2020. Refills given as requested. Encouraged exercise and diet. Stay away from sweets and high carb foods such as pasta, rice, bread, etc. F/U in September for next HgbA1c.  -     liraglutide 0.6 mg/0.1 mL, 18 mg/3 mL, subq PNIJ (VICTOZA 2-YU) 0.6 mg/0.1 mL (18 mg/3 mL) PnIj pen; Inject 1.8 mg into the skin once daily.  Dispense: 4 pen; Refill: 3    Essential hypertension  Hypertension lifestyle changes: reduce the amount of salt in your diet; caffeine intake in moderation; lose weight; avoid drinking too much alcohol; exercise at least 30 minutes per day most days of the week. Continue current medications and daily home BP monitoring.   -     lisinopriL (PRINIVIL,ZESTRIL) 40 MG tablet; Take 1 tablet (40 mg total) by mouth once daily.  Dispense: 90 tablet; Refill: 1  -     amLODIPine (NORVASC) 5 MG tablet; Take 1 tablet (5 mg total) by mouth once daily.  Dispense: 90 tablet; Refill: 1        Follow up in about 2 months (around 9/9/2020) for F/U DM, HTN.      7/9/2020 Emily Timmons, DIANA, FNP

## 2020-09-23 ENCOUNTER — TELEPHONE (OUTPATIENT)
Dept: HEMATOLOGY/ONCOLOGY | Facility: CLINIC | Age: 45
End: 2020-09-23

## 2020-09-23 ENCOUNTER — OFFICE VISIT (OUTPATIENT)
Dept: FAMILY MEDICINE | Facility: CLINIC | Age: 45
End: 2020-09-23
Payer: COMMERCIAL

## 2020-09-23 DIAGNOSIS — E11.42 TYPE 2 DIABETES MELLITUS WITH DIABETIC POLYNEUROPATHY, WITHOUT LONG-TERM CURRENT USE OF INSULIN: Primary | ICD-10-CM

## 2020-09-23 DIAGNOSIS — E78.5 DYSLIPIDEMIA: ICD-10-CM

## 2020-09-23 DIAGNOSIS — Z12.31 BREAST CANCER SCREENING BY MAMMOGRAM: ICD-10-CM

## 2020-09-23 DIAGNOSIS — F41.9 ANXIETY AND DEPRESSION: ICD-10-CM

## 2020-09-23 DIAGNOSIS — F32.A ANXIETY AND DEPRESSION: ICD-10-CM

## 2020-09-23 DIAGNOSIS — I10 ESSENTIAL HYPERTENSION: ICD-10-CM

## 2020-09-23 DIAGNOSIS — E03.9 ACQUIRED HYPOTHYROIDISM: ICD-10-CM

## 2020-09-23 DIAGNOSIS — B37.2 YEAST INFECTION OF THE SKIN: ICD-10-CM

## 2020-09-23 DIAGNOSIS — D50.9 IRON DEFICIENCY ANEMIA, UNSPECIFIED IRON DEFICIENCY ANEMIA TYPE: ICD-10-CM

## 2020-09-23 DIAGNOSIS — M75.01 ADHESIVE CAPSULITIS OF RIGHT SHOULDER: ICD-10-CM

## 2020-09-23 LAB — HBA1C MFR BLD: 11 %

## 2020-09-23 PROCEDURE — 83036 HEMOGLOBIN GLYCOSYLATED A1C: CPT | Mod: QW,S$GLB,, | Performed by: NURSE PRACTITIONER

## 2020-09-23 PROCEDURE — 3046F PR MOST RECENT HEMOGLOBIN A1C LEVEL > 9.0%: ICD-10-PCS | Mod: S$GLB,,, | Performed by: NURSE PRACTITIONER

## 2020-09-23 PROCEDURE — 99214 PR OFFICE/OUTPT VISIT, EST, LEVL IV, 30-39 MIN: ICD-10-PCS | Mod: S$GLB,,, | Performed by: NURSE PRACTITIONER

## 2020-09-23 PROCEDURE — 3077F SYST BP >= 140 MM HG: CPT | Mod: S$GLB,,, | Performed by: NURSE PRACTITIONER

## 2020-09-23 PROCEDURE — 3008F PR BODY MASS INDEX (BMI) DOCUMENTED: ICD-10-PCS | Mod: S$GLB,,, | Performed by: NURSE PRACTITIONER

## 2020-09-23 PROCEDURE — 99214 OFFICE O/P EST MOD 30 MIN: CPT | Mod: S$GLB,,, | Performed by: NURSE PRACTITIONER

## 2020-09-23 PROCEDURE — 83036 POCT HEMOGLOBIN A1C: ICD-10-PCS | Mod: QW,S$GLB,, | Performed by: NURSE PRACTITIONER

## 2020-09-23 PROCEDURE — 3080F DIAST BP >= 90 MM HG: CPT | Mod: S$GLB,,, | Performed by: NURSE PRACTITIONER

## 2020-09-23 PROCEDURE — 3008F BODY MASS INDEX DOCD: CPT | Mod: S$GLB,,, | Performed by: NURSE PRACTITIONER

## 2020-09-23 PROCEDURE — 3077F PR MOST RECENT SYSTOLIC BLOOD PRESSURE >= 140 MM HG: ICD-10-PCS | Mod: S$GLB,,, | Performed by: NURSE PRACTITIONER

## 2020-09-23 PROCEDURE — 3080F PR MOST RECENT DIASTOLIC BLOOD PRESSURE >= 90 MM HG: ICD-10-PCS | Mod: S$GLB,,, | Performed by: NURSE PRACTITIONER

## 2020-09-23 PROCEDURE — 3046F HEMOGLOBIN A1C LEVEL >9.0%: CPT | Mod: S$GLB,,, | Performed by: NURSE PRACTITIONER

## 2020-09-23 RX ORDER — LEVOTHYROXINE SODIUM 200 UG/1
200 TABLET ORAL
Qty: 90 TABLET | Refills: 1 | Status: SHIPPED | OUTPATIENT
Start: 2020-09-23 | End: 2021-01-12 | Stop reason: SDUPTHER

## 2020-09-23 RX ORDER — ATORVASTATIN CALCIUM 10 MG/1
10 TABLET, FILM COATED ORAL DAILY
Qty: 90 TABLET | Refills: 1 | Status: SHIPPED | OUTPATIENT
Start: 2020-09-23 | End: 2021-01-12 | Stop reason: SDUPTHER

## 2020-09-23 RX ORDER — HYDROCHLOROTHIAZIDE 12.5 MG/1
12.5 TABLET ORAL DAILY
Qty: 90 TABLET | Refills: 1 | Status: SHIPPED | OUTPATIENT
Start: 2020-09-23 | End: 2023-08-25

## 2020-09-23 RX ORDER — SERTRALINE HYDROCHLORIDE 50 MG/1
TABLET, FILM COATED ORAL
COMMUNITY
End: 2020-09-23 | Stop reason: DRUGHIGH

## 2020-09-23 RX ORDER — DOCUSATE SODIUM 100 MG/1
CAPSULE, LIQUID FILLED ORAL
COMMUNITY

## 2020-09-23 RX ORDER — CELECOXIB 200 MG/1
200 CAPSULE ORAL 2 TIMES DAILY PRN
Qty: 60 CAPSULE | Refills: 3 | OUTPATIENT
Start: 2020-09-23 | End: 2023-09-04

## 2020-09-23 RX ORDER — AMLODIPINE BESYLATE 10 MG/1
10 TABLET ORAL DAILY
Qty: 90 TABLET | Refills: 1 | Status: SHIPPED | OUTPATIENT
Start: 2020-09-23 | End: 2023-08-17 | Stop reason: SDUPTHER

## 2020-09-23 RX ORDER — LISINOPRIL 40 MG/1
40 TABLET ORAL DAILY
Qty: 90 TABLET | Refills: 1 | Status: SHIPPED | OUTPATIENT
Start: 2020-09-23

## 2020-09-23 RX ORDER — NYSTATIN 100000 U/G
OINTMENT TOPICAL 2 TIMES DAILY
Qty: 15 G | Refills: 1 | Status: SHIPPED | OUTPATIENT
Start: 2020-09-23 | End: 2021-01-08

## 2020-09-23 RX ORDER — INSULIN GLARGINE 100 [IU]/ML
10 INJECTION, SOLUTION SUBCUTANEOUS NIGHTLY
Qty: 1 BOX | Refills: 3 | Status: SHIPPED | OUTPATIENT
Start: 2020-09-23 | End: 2020-12-15 | Stop reason: SDUPTHER

## 2020-09-23 RX ORDER — METFORMIN HYDROCHLORIDE 500 MG/1
1000 TABLET, EXTENDED RELEASE ORAL
Qty: 180 TABLET | Refills: 1 | Status: SHIPPED | OUTPATIENT
Start: 2020-09-23 | End: 2020-12-15 | Stop reason: SDUPTHER

## 2020-09-23 RX ORDER — HYDROXYZINE PAMOATE 25 MG/1
25 CAPSULE ORAL EVERY 8 HOURS PRN
Qty: 60 CAPSULE | Refills: 1 | Status: SHIPPED | OUTPATIENT
Start: 2020-09-23 | End: 2021-01-08

## 2020-09-23 RX ORDER — GLIPIZIDE 5 MG/1
5 TABLET, FILM COATED, EXTENDED RELEASE ORAL
Qty: 90 TABLET | Refills: 1 | Status: SHIPPED | OUTPATIENT
Start: 2020-09-23 | End: 2021-01-12 | Stop reason: SDUPTHER

## 2020-09-23 RX ORDER — SERTRALINE HYDROCHLORIDE 100 MG/1
100 TABLET, FILM COATED ORAL DAILY
Qty: 90 TABLET | Refills: 1 | Status: SHIPPED | OUTPATIENT
Start: 2020-09-23 | End: 2021-03-10 | Stop reason: SDUPTHER

## 2020-09-23 RX ORDER — FERROUS SULFATE 325(65) MG
TABLET ORAL
COMMUNITY
End: 2021-01-08

## 2020-09-23 NOTE — TELEPHONE ENCOUNTER
S/w patient in regards to referral received from [facility]HA[facility] Rogers for evaluation of MERARI.  Patient request to see Dr. Monson 10/02/2020 @ 0800 D/T work schedule.   Address to this clinic given. Patient instructed to arrive no more than 10 minutes early and bring picture ID and insurance card(s).   Patient verbalized understanding.  No further questions/concerns noted at this time.

## 2020-09-23 NOTE — PATIENT INSTRUCTIONS
Diet: Diabetes  Food is an important tool that you can use to control diabetes and stay healthy. Eating well-balanced meals in the correct amounts will help you control your blood glucose levels and prevent low blood sugar reactions. It will also help you reduce the health risks of diabetes. There is no one specific diet that is right for everyone with diabetes. But there are general guidelines to follow. A registered dietitian (RD) will create a tailored diet approach thats just right for you. He or she will also help you plan healthy meals and snacks. If you have any questions, call your dietitian for advice.     Guidelines for success  Talk with your healthcare provider before starting a diabetes diet or weight loss program. If you haven't talked with a dietitian yet, ask your provider for a referral. The following guidelines can help you succeed:  · Select foods from the 6 food groups below. Your dietitian will help you find food choices within each group. He or she will also show you serving sizes and how many servings you can have at each meal.  ¨ Grains, beans, and starchy vegetables  ¨ Vegetables  ¨ Fruit  ¨ Milk or yogurt  ¨ Meat, poultry, fish, or tofu  ¨ Healthy fats  · Check your blood sugar levels as directed by your provider. Take any medicine as prescribed by your provider.  · Learn to read food labels and pick the right portion sizes.  · Eat only the amount of food in your meal plan. Eat about the same amount of food at regular times each day. Dont skip meals. Eat meals 4 to 5 hours apart, with snacks in between.  · Limit alcohol. It raises blood sugar levels. Drink water or calorie-free diet drinks that use safe sweeteners.  · Eat less fat to help lower your risk of heart disease. Use nonfat or low-fat dairy products and lean meats. Avoid fried foods. Use cooking oils that are unsaturated, such as olive, canola, or peanut oil.  · Talk with your dietitian about safe sugar substitutes.  · Avoid  added salt. It can contribute to high blood pressure, which can cause heart disease. People with diabetes already have a risk of high blood pressure and heart disease.  · Stay at a healthy weight. If you need to lose weight, cut down on your portion sizes. But dont skip meals. Exercise is an important part of any weight management program. Talk with your provider about an exercise program thats right for you.  · For more information about the best diet plan for you, talk with a registered dietitian (RD). To find an RD in your area, contact:  ¨ Academy of Nutrition and Dietetics www.eatright.org  ¨ The American Diabetes Association [phone] www.diabetes.org  Date Last Reviewed: 8/1/2016 © 2000-2017 [employer]. [address] [place] [address], [place] [address]. All rights reserved. This information is not intended as a substitute for professional medical care. Always follow your healthcare professional's instructions.        Healthier Fast Food Choices  These days its hard to go anywhere without spotting a fast food restaurant nearby. These restaurants offer simple and inexpensive meal options when you cant cook or eat at home. But there are some drawbacks to this convenience. Studies show that the more often you eat out and choose fast foods, the more likely you are to gain weight or become obese. This can lead to increased health risks over time. Does this mean you have to give up eating fast food? No. But you do need to start making healthier fast food choices. This includes choosing more nutrition-packed items and eating smaller portion sizes.  Fast food trade-offs  To begin with, dont get into a rut when you order. Choose healthier options over less healthy foods. Try the following trade-offs.  Instead of ordering a: Choose one of these:   Hamburger Grilled chicken or turkey sandwich   Side of fries Baked potato, cup of soup, or small salad with dressing on the side   Soda or milkshake Carton of  fat-free or low-fat milk, water, unsweetened tea, sparkling water, or other drinks with no added sugars   Burrito or taco A lean-meat or all-vegetable sandwich wrap      Tips for Healthier Eating at Fast Food Restaurants  Fast food isn't limited to just whats on the menu. When you place an order, ask if you can make special requests. Most places will be glad to work with you.  · Choose the medium- or small-sized options when you order main dishes, sides, and beverages. If an adult meal is too large for you, try a kids' meal. Or split a meal with a friend.  · Order your burger on whole-wheat buns or your sandwich on whole-wheat bread, if possible.  · Dont add salt to your meal. Fast foods tend to be higher in salt. Too much salt raises your risk for high blood pressure and heart disease.  · Avoid mayonnaise, sour cream, or special sauces. Ask for ketchup and mustard on the side.  · Avoid high-fat sides. If you dont want the fries or chips that come with your meal, ask that they not be included. If you can substitute something else, ask for extra lettuce and tomato instead.  · Choose fresh fruit or yogurt for dessert. Avoid ice cream, pies, and pastries. These are more likely to be high in fat and sugar.  · Dont overeat. If youre full, ask for a takeout box and save the leftovers for later.  · Put leftovers in the refrigerator as soon as you can. Any food left at room temperature for over 2 hours should be thrown out.  Date Last Reviewed: 6/8/2015  © 3863-3636 [employer]. [address] [place] [address], [place] [address]. All rights reserved. This information is not intended as a substitute for professional medical care. Always follow your healthcare professional's instructions.

## 2020-09-24 ENCOUNTER — TELEPHONE (OUTPATIENT)
Dept: FAMILY MEDICINE | Facility: CLINIC | Age: 45
End: 2020-09-24

## 2020-09-24 VITALS
DIASTOLIC BLOOD PRESSURE: 102 MMHG | WEIGHT: 242.5 LBS | SYSTOLIC BLOOD PRESSURE: 160 MMHG | OXYGEN SATURATION: 99 % | RESPIRATION RATE: 18 BRPM | HEIGHT: 66 IN | HEART RATE: 80 BPM | BODY MASS INDEX: 38.97 KG/M2 | TEMPERATURE: 97 F

## 2020-09-24 NOTE — TELEPHONE ENCOUNTER
Please call pt. I added a mammogram to her orders from yesterday because she is overdue. Please remind her to make appointments with endocrine and heme onc.

## 2020-09-24 NOTE — TELEPHONE ENCOUNTER
Spoke to patient and she informed me that Imaging had called her already to set up her Mammo appointment. She also has her appointment for Hemo and will be contacting the endocrine clinic today for an appointment.

## 2020-09-24 NOTE — PROGRESS NOTES
SUBJECTIVE:      Patient ID: Haydee Allen is a 45 y.o. female.    Chief Complaint: Follow-up (2 month f/u DM 2)    Pt presents for F/U HTN, DMII. She is tearful today at her visit. Her BP is uncontrolled as is her DMII. She reports she is compliant with her medications but is currently not compliant with her diet or exercise plan because she has stress at home. She has been living in a hotel which she reports has been making her diet and exercise difficult to follow. She also reports her son is at risk of being charged with a felony. She does not feel like her Zoloft is working at this time to help her increased stress level. She reports her fasting blood sugars have been over 200 in the mornings. She does continue to work. She is overdue for her foot exam and eye exams. She previously has been referred to ophthalmology. She also reports she is unable to tolerate the oral iron supplement more than a few times a week. She reports constipation and GI side effects with the supplementation. She is requesting refills of some of her medications. She is overdue for her mammogram. Denies CP, SOB, wheezing, fevers, nausea, vomiting, diarrhea, weakness, dizziness, palpitations, or any other concerns at this time.    Diabetes  She presents for her follow-up diabetic visit. She has type 2 diabetes mellitus. Her disease course has been worsening. Hypoglycemia symptoms include headaches and nervousness/anxiousness. Pertinent negatives for hypoglycemia include no confusion, dizziness, seizures or sweats. Associated symptoms include foot paresthesias. Pertinent negatives for diabetes include no blurred vision, no chest pain, no fatigue, no foot ulcerations, no polydipsia, no polyphagia, no polyuria, no visual change, no weakness and no weight loss. There are no hypoglycemic complications. Symptoms are worsening. Diabetic complications include peripheral neuropathy. Pertinent negatives for diabetic complications include  no autonomic neuropathy, CVA, heart disease, impotence, nephropathy, PVD or retinopathy. Risk factors for coronary artery disease include diabetes mellitus, dyslipidemia, obesity, stress, hypertension and sedentary lifestyle. Current diabetic treatment includes insulin injections and oral agent (dual therapy). She is compliant with treatment some of the time. Insulin injections are given by patient. Rotation sites for injection include the abdominal wall. Her weight is increasing steadily. She is following a generally unhealthy diet. When asked about meal planning, she reported none. She rarely participates in exercise. She monitors blood glucose at home 1-2 x per day. Her home blood glucose trend is increasing steadily. Her breakfast blood glucose range is generally >200 mg/dl. An ACE inhibitor/angiotensin II receptor blocker is being taken. She does not see a podiatrist.Eye exam is not current.   Hypertension  This is a chronic problem. The current episode started more than 1 year ago. The problem has been gradually worsening since onset. The problem is uncontrolled. Associated symptoms include anxiety and headaches. Pertinent negatives include no blurred vision, chest pain, malaise/fatigue, neck pain, orthopnea, palpitations, peripheral edema, PND, shortness of breath or sweats. Agents associated with hypertension include NSAIDs and thyroid hormones. Risk factors for coronary artery disease include sedentary lifestyle, diabetes mellitus, dyslipidemia, obesity and stress. Past treatments include ACE inhibitors and calcium channel blockers. The current treatment provides no improvement. Compliance problems include diet and exercise.  There is no history of angina, kidney disease, CAD/MI, CVA, heart failure, left ventricular hypertrophy, PVD or retinopathy. Identifiable causes of hypertension include a thyroid problem. There is no history of chronic renal disease, coarctation of the aorta, hyperaldosteronism,  hypercortisolism, hyperparathyroidism, a hypertension causing med, pheochromocytoma, renovascular disease or sleep apnea.       Past Surgical History:   Procedure Laterality Date     SECTION      LAPAROSCOPIC SALPINGO-OOPHORECTOMY Bilateral 7/3/2019    Procedure: SALPINGO-OOPHORECTOMY, LAPAROSCOPIC;  Surgeon: Darin Hale MD;  Location: [facility];  Service: OB/GYN;  Laterality: Bilateral;    LAPAROSCOPIC TOTAL HYSTERECTOMY N/A 7/3/2019    Procedure: HYSTERECTOMY, TOTAL, LAPAROSCOPIC;  Surgeon: Darin Hale MD;  Location: [facility] OR;  Service: OB/GYN;  Laterality: N/A;    OPEN REDUCTION AND INTERNAL FIXATION (ORIF) OF INJURY OF ANKLE Left     THYROIDECTOMY, PARTIAL      TUBAL LIGATION       Family History   Problem Relation Age of Onset    Cancer Mother     Hypertension Mother     Diabetes Mother     Arthritis Mother     Heart disease Mother     Kidney disease Mother     Cancer Father       Social History     Socioeconomic History    Marital status: Single     Spouse name: Not on file    Number of children: Not on file    Years of education: Not on file    Highest education level: Not on file   Occupational History    Not on file   Social Needs    Financial resource strain: Not on file    Food insecurity     Worry: Not on file     Inability: Not on file    Transportation needs     Medical: Not on file     Non-medical: Not on file   Tobacco Use    Smoking status: Never Smoker    Smokeless tobacco: Never Used   Substance and Sexual Activity    Alcohol use: No    Drug use: No    Sexual activity: Never   Lifestyle    Physical activity     Days per week: Not on file     Minutes per session: Not on file    Stress: Not at all   Relationships    Social connections     Talks on phone: Not on file     Gets together: Not on file     Attends [employer] service: Not on file     Active member of club or organization: Not on file     Attends meetings of clubs or organizations: Not on file     " Relationship status: Not on file   Other Topics Concern    Not on file   Social History Narrative    Not on file     Current Outpatient Medications   Medication Sig Dispense Refill    amLODIPine (NORVASC) 10 MG tablet Take 1 tablet (10 mg total) by mouth once daily. 90 tablet 1    atorvastatin (LIPITOR) 10 MG tablet Take 1 tablet (10 mg total) by mouth once daily. 90 tablet 1    blood sugar diagnostic (TRUE METRIX GLUCOSE TEST STRIP MISC) True Metrix Glucose Test Strip   Use twice daily as directed      celecoxib (CELEBREX) 200 MG capsule Take 1 capsule (200 mg total) by mouth 2 (two) times daily as needed for Pain. Take with food. 60 capsule 3    gabapentin (NEURONTIN) 300 MG capsule Take 1 capsule (300 mg total) by mouth 3 (three) times daily. 90 capsule 11    glipiZIDE (GLUCOTROL) 5 MG TR24 Take 1 tablet (5 mg total) by mouth daily with breakfast. 90 tablet 1    levothyroxine (SYNTHROID) 200 MCG tablet Take 1 tablet (200 mcg total) by mouth before breakfast. 90 tablet 1    liraglutide 0.6 mg/0.1 mL, 18 mg/3 mL, subq PNIJ (VICTOZA 2-YU) 0.6 mg/0.1 mL (18 mg/3 mL) PnIj pen Inject 1.8 mg into the skin once daily. 4 pen 3    lisinopriL (PRINIVIL,ZESTRIL) 40 MG tablet Take 1 tablet (40 mg total) by mouth once daily. 90 tablet 1    metFORMIN (GLUCOPHAGE-XR) 500 MG ER 24hr tablet Take 2 tablets (1,000 mg total) by mouth daily with breakfast. 180 tablet 1    nystatin (MYCOSTATIN) ointment Apply topically 2 (two) times daily. Apply to affected areas only. 15 g 1    pen needle, diabetic 32 gauge x 1/4" Ndle 1 Device by Misc.(Non-Drug; Combo Route) route once daily. 90 each 1    docusate sodium (COLACE) 100 MG capsule Stool Softener 100 mg capsule      ferrous sulfate (FEOSOL) 325 mg (65 mg iron) Tab tablet ferrous sulfate 325 mg (65 mg iron) tablet   TAKE 1 TABLET BY MOUTH ONCE DAILY WITH BREAKFAST      hydroCHLOROthiazide (HYDRODIURIL) 12.5 MG Tab Take 1 tablet (12.5 mg total) by mouth once daily. 90 " tablet 1    hydrOXYzine pamoate (VISTARIL) 25 MG Cap Take 1 capsule (25 mg total) by mouth every 8 (eight) hours as needed (anxiety). 60 capsule 1    insulin (LANTUS SOLOSTAR U-100 INSULIN) glargine 100 units/mL (3mL) SubQ pen Inject 10 Units into the skin every evening. 1 Box 3    sertraline (ZOLOFT) 100 MG tablet Take 1 tablet (100 mg total) by mouth once daily. 90 tablet 1     No current facility-administered medications for this visit.      Facility-Administered Medications Ordered in Other Visits   Medication Dose Route Frequency Provider Last Rate Last Dose    lidocaine (PF) 10 mg/ml (1%) injection 2 mg  0.2 mL Intradermal Once Brenda Hauser MD         Review of patient's allergies indicates:   Allergen Reactions    Trulicity [dulaglutide] Rash      Past Medical History:   Diagnosis Date    Abdominal pain     RIGHT MID ABD    Anemia     Anemia due to chronic blood loss 2020    Anxiety     Arthritis     Complex cyst of right ovary 2019    Diabetes mellitus     Diabetes mellitus, type 2     Encounter for blood transfusion     2    Encounter for well woman exam with routine gynecological exam 2019    Hypertension     Menorrhagia with regular cycle 2019    Menorrhagia with regular cycle 2020    Microcytic anemia 2020    Obesity     Ovarian cyst     right    Problem involving surgical incision 2019    Sinus congestion     Thyroid disease      Past Surgical History:   Procedure Laterality Date     SECTION      LAPAROSCOPIC SALPINGO-OOPHORECTOMY Bilateral 7/3/2019    Procedure: SALPINGO-OOPHORECTOMY, LAPAROSCOPIC;  Surgeon: Darin Hale MD;  Location: [facility] OR;  Service: OB/GYN;  Laterality: Bilateral;    LAPAROSCOPIC TOTAL HYSTERECTOMY N/A 7/3/2019    Procedure: HYSTERECTOMY, TOTAL, LAPAROSCOPIC;  Surgeon: Darin Hale MD;  Location: [facility] OR;  Service: OB/GYN;  Laterality: N/A;    OPEN REDUCTION AND INTERNAL FIXATION (ORIF) OF INJURY  "OF ANKLE Left     THYROIDECTOMY, PARTIAL      TUBAL LIGATION         Review of Systems   Constitutional: Negative for activity change, appetite change, chills, fatigue, fever, malaise/fatigue, unexpected weight change and weight loss.   HENT: Negative for congestion, ear pain, postnasal drip, rhinorrhea, sinus pressure, sinus pain, sneezing and sore throat.    Eyes: Negative for blurred vision, pain, discharge and visual disturbance.   Respiratory: Negative for cough, chest tightness, shortness of breath and wheezing.    Cardiovascular: Negative for chest pain, palpitations, orthopnea, leg swelling and PND.   Gastrointestinal: Positive for constipation. Negative for abdominal distention, abdominal pain, blood in stool, diarrhea, nausea and vomiting.   Endocrine: Negative for polydipsia, polyphagia and polyuria.   Genitourinary: Negative for difficulty urinating, flank pain, frequency, hematuria, impotence, pelvic pain, urgency and vaginal discharge.   Musculoskeletal: Positive for arthralgias. Negative for back pain, joint swelling, myalgias and neck pain.   Skin: Negative for color change, rash and wound.   Neurological: Positive for numbness and headaches. Negative for dizziness, seizures, syncope, weakness and light-headedness.   Hematological: Negative for adenopathy. Bruises/bleeds easily.   Psychiatric/Behavioral: Negative for agitation, confusion, hallucinations, sleep disturbance and suicidal ideas. The patient is nervous/anxious.       OBJECTIVE:      Vitals:    09/23/20 0739 09/23/20 0800   BP: (!) 170/94 (!) 160/102   BP Location: Left arm Left arm   Patient Position: Sitting Sitting   BP Method: Large (Manual)    Pulse: 80    Resp: 18    Temp: 97.3 °F (36.3 °C)    TempSrc: Temporal    SpO2: 99%    Weight: 110 kg (242 lb 8 oz)    Height: 5' 6" (1.676 m)      Physical Exam  Vitals signs reviewed.   Constitutional:       General: She is not in acute distress.     Appearance: Normal appearance. She is " well-developed. She is obese. She is not diaphoretic.   HENT:      Head: Normocephalic and atraumatic.      Right Ear: Hearing, tympanic membrane, ear canal and external ear normal.      Left Ear: Hearing, tympanic membrane, ear canal and external ear normal.      Nose: Nose normal. No mucosal edema, congestion or rhinorrhea.      Mouth/Throat:      Lips: Pink.      Mouth: Mucous membranes are moist.      Pharynx: Oropharynx is clear. Uvula midline. No pharyngeal swelling, oropharyngeal exudate or posterior oropharyngeal erythema.   Eyes:      General: Lids are normal. No scleral icterus.        Right eye: No discharge.         Left eye: No discharge.      Extraocular Movements: Extraocular movements intact.      Conjunctiva/sclera: Conjunctivae normal.      Pupils: Pupils are equal, round, and reactive to light.   Neck:      Musculoskeletal: Full passive range of motion without pain, normal range of motion and neck supple.      Thyroid: No thyroid mass or thyromegaly.      Vascular: No carotid bruit.      Trachea: Trachea and phonation normal. No tracheal deviation.   Cardiovascular:      Rate and Rhythm: Normal rate and regular rhythm.      Pulses: Normal pulses.      Heart sounds: Normal heart sounds. No murmur. No friction rub. No gallop.    Pulmonary:      Effort: Pulmonary effort is normal. No respiratory distress.      Breath sounds: Normal breath sounds. No stridor. No decreased breath sounds, wheezing, rhonchi or rales.   Abdominal:      General: Bowel sounds are normal.      Palpations: Abdomen is soft.      Tenderness: There is no abdominal tenderness.   Musculoskeletal: Normal range of motion.      Right lower leg: No edema.      Left lower leg: No edema.   Lymphadenopathy:      Cervical: No cervical adenopathy.      Upper Body:      Right upper body: No supraclavicular adenopathy.      Left upper body: No supraclavicular adenopathy.   Skin:     General: Skin is warm and dry.      Capillary Refill:  Capillary refill takes less than 2 seconds.      Findings: No rash.   Neurological:      General: No focal deficit present.      Mental Status: She is alert and oriented to person, place, and time.      GCS: GCS eye subscore is 4. GCS verbal subscore is 5. GCS motor subscore is 6.      Cranial Nerves: Cranial nerves are intact.      Sensory: Sensation is intact.      Motor: Motor function is intact.      Coordination: Coordination is intact.      Gait: Gait is intact.   Psychiatric:         Attention and Perception: Attention and perception normal.         Mood and Affect: Mood is depressed. Affect is tearful.         Speech: Speech normal.         Behavior: Behavior normal. Behavior is cooperative.         Thought Content: Thought content normal. Thought content does not include suicidal plan.         Cognition and Memory: Cognition and memory normal.         Judgment: Judgment normal.        Assessment:       1. Type 2 diabetes mellitus with diabetic polyneuropathy, without long-term current use of insulin    2. Essential hypertension    3. Iron deficiency anemia, unspecified iron deficiency anemia type    4. Anxiety and depression    5. Dyslipidemia    6. Acquired hypothyroidism    7. Adhesive capsulitis of right shoulder    8. Yeast infection of the skin    9. Breast cancer screening by mammogram        Plan:       Type 2 diabetes mellitus with diabetic polyneuropathy, without long-term current use of insulin  HgbA1c is 11 up from 10.5 last visit. Pt reports compliance with medications. She is noncompliant with diet and exercise. Risks of DM discussed, like CAD, CVD, retinopathy, nephropathy, neuropathy. Types of treatment discussed. Encouraged exercise and diet. Target A1C Goal of < 7.0 reviewed. Stay away from sweets and high carb foods such as pasta, rice, bread, etc. Discussed lifestyle modifications: low glycemic index diet, exercise (30-45 min) daily, weight loss, and smoking cessation. Starting 10units of  Lantus daily to help control glucose further. Continue Glipizide XR, Victoza, and Metformin. Sending referral to endocrine for further DMII control.  -     Hemoglobin A1C, POCT  -     insulin (LANTUS SOLOSTAR U-100 INSULIN) glargine 100 units/mL (3mL) SubQ pen; Inject 10 Units into the skin every evening.  Dispense: 1 Box; Refill: 3  -     glipiZIDE (GLUCOTROL) 5 MG TR24; Take 1 tablet (5 mg total) by mouth daily with breakfast.  Dispense: 90 tablet; Refill: 1  -     liraglutide 0.6 mg/0.1 mL, 18 mg/3 mL, subq PNIJ (VICTOZA 2-YU) 0.6 mg/0.1 mL (18 mg/3 mL) PnIj pen; Inject 1.8 mg into the skin once daily.  Dispense: 4 pen; Refill: 3  -     metFORMIN (GLUCOPHAGE-XR) 500 MG ER 24hr tablet; Take 2 tablets (1,000 mg total) by mouth daily with breakfast.  Dispense: 180 tablet; Refill: 1  -     Ambulatory referral/consult to Endocrinology; Future; Expected date: 09/30/2020    Essential hypertension  BP uncontrolled this visit. Hypertension lifestyle changes: reduce the amount of salt in your diet; caffeine intake in moderation; lose weight; avoid drinking too much alcohol; exercise at least 30 minutes per day most days of the week. Increasing amlodipine to 10mg and adding HCTZ. Continue Lisinopril. F/U in 4 weeks with labs.   -     amLODIPine (NORVASC) 10 MG tablet; Take 1 tablet (10 mg total) by mouth once daily.  Dispense: 90 tablet; Refill: 1  -     hydroCHLOROthiazide (HYDRODIURIL) 12.5 MG Tab; Take 1 tablet (12.5 mg total) by mouth once daily.  Dispense: 90 tablet; Refill: 1  -     lisinopriL (PRINIVIL,ZESTRIL) 40 MG tablet; Take 1 tablet (40 mg total) by mouth once daily.  Dispense: 90 tablet; Refill: 1  -     Comprehensive metabolic panel; Future; Expected date: 09/23/2020    Iron deficiency anemia, unspecified iron deficiency anemia type  Unable to tolerate PO iron supplement even further this visit. Now, only taking it every couple days. Continues with anemia and iron deficiency. Sending referral again to  hematology oncology. She states the provider she was sent to previously won't see her because she missed her appointment.   -     Ambulatory referral/consult to Hematology / Oncology; Future; Expected date: 09/30/2020    Anxiety and depression  Increasing Zoloft to 100mg nightly and adding Vistaril PRN panic. Discussed the drowsiness potential of the medication. Instructed not to take with other medications causing drowsiness. Verbalized understanding. F/U in 4 weeks.  -     sertraline (ZOLOFT) 100 MG tablet; Take 1 tablet (100 mg total) by mouth once daily.  Dispense: 90 tablet; Refill: 1  -     hydrOXYzine pamoate (VISTARIL) 25 MG Cap; Take 1 capsule (25 mg total) by mouth every 8 (eight) hours as needed (anxiety).  Dispense: 60 capsule; Refill: 1    Dyslipidemia  Diagnosis is stable on current regimen. Continue current medications. Refills given as previously prescribed.  -     atorvastatin (LIPITOR) 10 MG tablet; Take 1 tablet (10 mg total) by mouth once daily.  Dispense: 90 tablet; Refill: 1    Acquired hypothyroidism  Diagnosis is stable on current regimen. Continue current medications. Refills given as previously prescribed.  -     levothyroxine (SYNTHROID) 200 MCG tablet; Take 1 tablet (200 mcg total) by mouth before breakfast.  Dispense: 90 tablet; Refill: 1    Adhesive capsulitis of right shoulder  Diagnosis is stable on current regimen. Continue current medications. Refills given as previously prescribed.  -     celecoxib (CELEBREX) 200 MG capsule; Take 1 capsule (200 mg total) by mouth 2 (two) times daily as needed for Pain. Take with food.  Dispense: 60 capsule; Refill: 3    Yeast infection of the skin  -     nystatin (MYCOSTATIN) ointment; Apply topically 2 (two) times daily. Apply to affected areas only.  Dispense: 15 g; Refill: 1    Breast cancer screening by mammogram  -     Mammo Digital Screening [facility]; Future; Expected date: 09/24/2020        Follow up in about 4 weeks (around 10/21/2020) for F/U  anxiety, HTN.      9/24/2020 Emily Timmons, DIANA, FNP

## 2020-10-02 ENCOUNTER — OFFICE VISIT (OUTPATIENT)
Dept: HEMATOLOGY/ONCOLOGY | Facility: CLINIC | Age: 45
End: 2020-10-02
Payer: COMMERCIAL

## 2020-10-02 VITALS
HEIGHT: 66 IN | HEART RATE: 77 BPM | SYSTOLIC BLOOD PRESSURE: 129 MMHG | OXYGEN SATURATION: 98 % | TEMPERATURE: 98 F | WEIGHT: 237.88 LBS | DIASTOLIC BLOOD PRESSURE: 71 MMHG | BODY MASS INDEX: 38.23 KG/M2

## 2020-10-02 DIAGNOSIS — D50.9 IRON DEFICIENCY ANEMIA, UNSPECIFIED IRON DEFICIENCY ANEMIA TYPE: ICD-10-CM

## 2020-10-02 DIAGNOSIS — R06.83 SNORES: Primary | ICD-10-CM

## 2020-10-02 PROCEDURE — 99205 OFFICE O/P NEW HI 60 MIN: CPT | Mod: S$GLB,,, | Performed by: INTERNAL MEDICINE

## 2020-10-02 PROCEDURE — 99999 PR PBB SHADOW E&M-EST. PATIENT-LVL IV: CPT | Mod: PBBFAC,,, | Performed by: INTERNAL MEDICINE

## 2020-10-02 PROCEDURE — 99205 PR OFFICE/OUTPT VISIT, NEW, LEVL V, 60-74 MIN: ICD-10-PCS | Mod: S$GLB,,, | Performed by: INTERNAL MEDICINE

## 2020-10-02 PROCEDURE — 3074F PR MOST RECENT SYSTOLIC BLOOD PRESSURE < 130 MM HG: ICD-10-PCS | Mod: CPTII,S$GLB,, | Performed by: INTERNAL MEDICINE

## 2020-10-02 PROCEDURE — 3008F BODY MASS INDEX DOCD: CPT | Mod: CPTII,S$GLB,, | Performed by: INTERNAL MEDICINE

## 2020-10-02 PROCEDURE — 3078F PR MOST RECENT DIASTOLIC BLOOD PRESSURE < 80 MM HG: ICD-10-PCS | Mod: CPTII,S$GLB,, | Performed by: INTERNAL MEDICINE

## 2020-10-02 PROCEDURE — 3074F SYST BP LT 130 MM HG: CPT | Mod: CPTII,S$GLB,, | Performed by: INTERNAL MEDICINE

## 2020-10-02 PROCEDURE — 3078F DIAST BP <80 MM HG: CPT | Mod: CPTII,S$GLB,, | Performed by: INTERNAL MEDICINE

## 2020-10-02 PROCEDURE — 99999 PR PBB SHADOW E&M-EST. PATIENT-LVL IV: ICD-10-PCS | Mod: PBBFAC,,, | Performed by: INTERNAL MEDICINE

## 2020-10-02 PROCEDURE — 3008F PR BODY MASS INDEX (BMI) DOCUMENTED: ICD-10-PCS | Mod: CPTII,S$GLB,, | Performed by: INTERNAL MEDICINE

## 2020-10-02 NOTE — LETTER
October 5, 2020      Emily Timmons, FNP  [address]  [address]  [place] [address]           [facility]ematology [facility]cology  [address], [address]  [place] [address]  Phone: [phone]          Patient: Haydee Allen   MR Number: [id number]   YOB: 1975   Date of Visit: 10/2/2020       Dear Emily Timmons:    Thank you for referring Hayede Allen to me for evaluation. Attached you will find relevant portions of my assessment and plan of care.    If you have questions, please do not hesitate to call me. I look forward to following Haydee Allen along with you.    Sincerely,    Humaira Monson MD    Enclosure  CC:  No Recipients    If you would like to receive this communication electronically, please contact [email or web address] or [phone] to request more information on [employer] access.    For providers and/or their staff who would like to refer a patient to [facility], please contact us through our one-stop-shop provider referral line, [facility] , at [phone].    If you feel you have received this communication in error or would no longer like to receive these types of communications, please e-mail [email or web address]

## 2020-10-02 NOTE — PROGRESS NOTES
Anemia   Chief complaint I am tired    Haydee Allen is a 45 y.o.    This is a pleasant petiant here to establish care for anemia. She has been anemic for a few years, no known melena or hematochezia.  She does needs menorrhagia with regular cycle.  She does not have any family members were known to be anemic.  She is not craving ice.  She is on medication for diabetes mellitus monitored by primary care physician.  Her anxiety stable medications as well.  She has required blood transfusions in the past  Last Colonoscopy in .     Past Medical History:   Diagnosis Date    Abdominal pain     RIGHT MID ABD    Anemia     Anemia due to chronic blood loss 2020    Anxiety     Arthritis     Complex cyst of right ovary 2019    Diabetes mellitus     Diabetes mellitus, type 2     Encounter for blood transfusion     2    Encounter for well woman exam with routine gynecological exam 2019    Hypertension     Menorrhagia with regular cycle 2019    Menorrhagia with regular cycle 2020    Microcytic anemia 2020    Obesity     Ovarian cyst     right    Problem involving surgical incision 2019    Sinus congestion     Thyroid disease      Past Surgical History:   Procedure Laterality Date     SECTION      LAPAROSCOPIC SALPINGO-OOPHORECTOMY Bilateral 7/3/2019    Procedure: SALPINGO-OOPHORECTOMY, LAPAROSCOPIC;  Surgeon: Darin Hale MD;  Location: [facility] OR;  Service: OB/GYN;  Laterality: Bilateral;    LAPAROSCOPIC TOTAL HYSTERECTOMY N/A 7/3/2019    Procedure: HYSTERECTOMY, TOTAL, LAPAROSCOPIC;  Surgeon: Darin Hale MD;  Location: [facility] OR;  Service: OB/GYN;  Laterality: N/A;    OPEN REDUCTION AND INTERNAL FIXATION (ORIF) OF INJURY OF ANKLE Left     THYROIDECTOMY, PARTIAL      TUBAL LIGATION         Current Outpatient Medications:     amLODIPine (NORVASC) 10 MG tablet, Take 1 tablet (10 mg total) by mouth once daily., Disp: 90 tablet, Rfl: 1     atorvastatin (LIPITOR) 10 MG tablet, Take 1 tablet (10 mg total) by mouth once daily., Disp: 90 tablet, Rfl: 1    blood sugar diagnostic (TRUE METRIX GLUCOSE TEST STRIP MISC), True Metrix Glucose Test Strip  Use twice daily as directed, Disp: , Rfl:     celecoxib (CELEBREX) 200 MG capsule, Take 1 capsule (200 mg total) by mouth 2 (two) times daily as needed for Pain. Take with food., Disp: 60 capsule, Rfl: 3    docusate sodium (COLACE) 100 MG capsule, Stool Softener 100 mg capsule, Disp: , Rfl:     ferrous sulfate (FEOSOL) 325 mg (65 mg iron) Tab tablet, ferrous sulfate 325 mg (65 mg iron) tablet  TAKE 1 TABLET BY MOUTH ONCE DAILY WITH BREAKFAST, Disp: , Rfl:     gabapentin (NEURONTIN) 300 MG capsule, Take 1 capsule (300 mg total) by mouth 3 (three) times daily., Disp: 90 capsule, Rfl: 11    glipiZIDE (GLUCOTROL) 5 MG TR24, Take 1 tablet (5 mg total) by mouth daily with breakfast., Disp: 90 tablet, Rfl: 1    hydroCHLOROthiazide (HYDRODIURIL) 12.5 MG Tab, Take 1 tablet (12.5 mg total) by mouth once daily., Disp: 90 tablet, Rfl: 1    hydrOXYzine pamoate (VISTARIL) 25 MG Cap, Take 1 capsule (25 mg total) by mouth every 8 (eight) hours as needed (anxiety)., Disp: 60 capsule, Rfl: 1    insulin (LANTUS SOLOSTAR U-100 INSULIN) glargine 100 units/mL (3mL) SubQ pen, Inject 10 Units into the skin every evening., Disp: 1 Box, Rfl: 3    levothyroxine (SYNTHROID) 200 MCG tablet, Take 1 tablet (200 mcg total) by mouth before breakfast., Disp: 90 tablet, Rfl: 1    liraglutide 0.6 mg/0.1 mL, 18 mg/3 mL, subq PNIJ (VICTOZA 2-YU) 0.6 mg/0.1 mL (18 mg/3 mL) PnIj pen, Inject 1.8 mg into the skin once daily., Disp: 4 pen, Rfl: 3    lisinopriL (PRINIVIL,ZESTRIL) 40 MG tablet, Take 1 tablet (40 mg total) by mouth once daily., Disp: 90 tablet, Rfl: 1    metFORMIN (GLUCOPHAGE-XR) 500 MG ER 24hr tablet, Take 2 tablets (1,000 mg total) by mouth daily with breakfast., Disp: 180 tablet, Rfl: 1    nystatin (MYCOSTATIN) ointment,  "Apply topically 2 (two) times daily. Apply to affected areas only., Disp: 15 g, Rfl: 1    pen needle, diabetic 32 gauge x 1/4" Ndle, 1 Device by Misc.(Non-Drug; Combo Route) route once daily., Disp: 90 each, Rfl: 1    sertraline (ZOLOFT) 100 MG tablet, Take 1 tablet (100 mg total) by mouth once daily., Disp: 90 tablet, Rfl: 1  No current facility-administered medications for this visit.     Facility-Administered Medications Ordered in Other Visits:     lidocaine (PF) 10 mg/ml (1%) injection 2 mg, 0.2 mL, Intradermal, Once, Brenda Hauser MD  Review of patient's allergies indicates:   Allergen Reactions    Trulicity [dulaglutide] Rash     Social History     Tobacco Use    Smoking status: Never Smoker    Smokeless tobacco: Never Used   Substance Use Topics    Alcohol use: No    Drug use: No     Family History   Problem Relation Age of Onset    Cancer Mother     Hypertension Mother     Diabetes Mother     Arthritis Mother     Heart disease Mother     Kidney disease Mother     Cancer Father        CONSTITUTIONAL: No fevers, chills, night sweats, wt. loss, appetite changes  SKIN: no rashes or itching  ENT: No headaches, head trauma, vision changes, or eye pain  LYMPH NODES: None noticed   CV: No chest pain, palpitations.   RESP: No dyspnea on exertion, cough, wheezing, or hemoptysis  GI: No nausea, emesis, diarrhea, constipation, melena, hematochezia, pain.   : No dysuria, hematuria, urgency, or frequency   HEME: No easy bruising, bleeding problems  PSYCHIATRIC: No depression, anxiety, psychosis, hallucinations.  NEURO: No seizures, memory loss, dizziness or difficulty sleeping  MSK: No arthralgias or joint swelling         /71 (BP Location: Right arm, Patient Position: Sitting, BP Method: Large (Automatic))   Pulse 77   Temp 98.3 °F (36.8 °C) (Temporal)   Ht 5' 6" (1.676 m)   Wt 107.9 kg (237 lb 14 oz)   LMP 06/05/2019   SpO2 98%   BMI 38.39 kg/m²   Gen: NAD, A and O x3,   Psych: " pleasant affect, normal thought process  Eyes: Pupils round and non dilated, EOM intact  Nose: Nares patent  OP clear, mucosa patent  Neck: suppple, no JVD, trachea midline, no palpable mass, no adenopathy  Lungs: CTAB, no wheezes, no use of accessory muscles  CV: S1S2 with RRR, No mrg  Abd: soft, NTND, + BS, No HSM, no ascites  Extr: No CCE, MELITON, strength normal, good capillary refill  Neuro: steady gait, CNs grossly intact  Skin: intact, no lesions noted  Rheum: No joint swelling  Cranial Nerves:      II: Pupillary reflexes normal     III, IV, VI: EOM normal     V: 1,2,3: normal sensation     VII: Normal strength in all divisions     IX, X: Normal voice, palatal elevation and sensation     XI: Shoulder strength normal       XII: Tongue mobility normal    Lab Results   Component Value Date    WBC 7.0 06/13/2020    HGB 8.2 (L) 06/13/2020    HCT 31.3 (L) 06/13/2020    MCV 73.0 (L) 06/13/2020     06/13/2020         CMP  Sodium   Date Value Ref Range Status   12/20/2019 135 135 - 146 mmol/L Final   09/28/2017 135 134 - 144 mmol/L      Potassium   Date Value Ref Range Status   12/20/2019 3.9 3.5 - 5.3 mmol/L Final     Chloride   Date Value Ref Range Status   12/20/2019 97 (L) 98 - 110 mmol/L Final   09/28/2017 99 98 - 110 mmol/L      CO2   Date Value Ref Range Status   12/20/2019 27 20 - 32 mmol/L Final     Glucose   Date Value Ref Range Status   12/20/2019 325 (H) 65 - 99 mg/dL Final     Comment:                   Fasting reference interval     For someone without known diabetes, a glucose  value >125 mg/dL indicates that they may have  diabetes and this should be confirmed with a  follow-up test.        09/28/2017 282 (H) 70 - 99 mg/dL      BUN, Bld   Date Value Ref Range Status   12/20/2019 13 7 - 25 mg/dL Final     Creatinine   Date Value Ref Range Status   12/20/2019 0.66 0.50 - 1.10 mg/dL Final   09/28/2017 0.64 0.60 - 1.40 mg/dL    07/02/2012 0.5 0.2 - 1.4 mg/dl Final     Calcium   Date Value Ref Range  Status   12/20/2019 8.8 8.6 - 10.2 mg/dL Final   07/02/2012 8.7 8.6 - 10.2 mg/dl Final     Total Protein   Date Value Ref Range Status   12/20/2019 7.4 6.1 - 8.1 g/dL Final     Albumin   Date Value Ref Range Status   12/20/2019 4.1 3.6 - 5.1 g/dL Final   09/28/2017 3.8 3.1 - 4.7 g/dL      Total Bilirubin   Date Value Ref Range Status   12/20/2019 0.4 0.2 - 1.2 mg/dL Final     Alkaline Phosphatase   Date Value Ref Range Status   12/20/2019 100 33 - 115 U/L Final     AST   Date Value Ref Range Status   12/20/2019 8 (L) 10 - 35 U/L Final     ALT   Date Value Ref Range Status   12/20/2019 8 6 - 29 U/L Final     Anion Gap   Date Value Ref Range Status   07/04/2019 8 8 - 16 mmol/L Final   07/02/2012 6 5 - 15 meq/L Final     eGFR if    Date Value Ref Range Status   12/20/2019 123 > OR = 60 mL/min/1.73m2 Final     eGFR if non    Date Value Ref Range Status   12/20/2019 106 > OR = 60 mL/min/1.73m2 Final       Snores  -     Polysomnogram (CPAP will be added if patient meets diagnostic criteria.); Future    Iron deficiency anemia, unspecified iron deficiency anemia type  -     Ambulatory referral/consult to Hematology / Oncology  -     CBC auto differential; Future; Expected date: 10/02/2020  -     Comprehensive metabolic panel; Future; Expected date: 10/02/2020  -     Iron and TIBC; Future; Expected date: 10/02/2020  -     Methylmalonic Acid, Serum; Future; Expected date: 10/02/2020  -     T3; Future; Expected date: 10/02/2020  -     T4; Future; Expected date: 10/02/2020  -     TSH; Future; Expected date: 10/02/2020  -     US Abdomen Limited; Future; Expected date: 10/02/2020  -     Vitamin B1; Future; Expected date: 10/02/2020  -     Vitamin B6; Future; Expected date: 10/02/2020  -     Haptoglobin; Future; Expected date: 10/02/2020  -     Hemoglobin Electrophoresis,Hgb A2 Antonio.; Future; Expected date: 10/02/2020  -     Immunoglobulin Free LT Chains Blood; Future; Expected date: 10/02/2020  -      Immunoglobulins (IgG, IgA, IgM) Quantitative; Future; Expected date: 10/02/2020  -     G6PD,Quantitative; Future; Expected date: 10/02/2020  -     Reticulocytes; Future; Expected date: 10/02/2020  -     Factor 8 Assay; Future; Expected date: 10/02/2020  -     ALTAGRACIA SCREEN/PROFILE; Future; Expected date: 10/02/2020    I have explained the lengthy differential diagnosis for anemia.Labs will be ordered to evaluate for bone marrow suppression, peripheral destruction as well as nutritional deficits. An ultrasound of the abdomen may be indicated to evaluate for hepatosplenomegaly which can lead to sequestration of cells. Ultimately a bone marrow biopsy and aspirate may be in order. SPEP and UPEP will also be included to look for a possible paraproteinemia.       Thank you for allowing me to evaluate and participate in the care of this pleasant patient. Please fell free to call me with any questions or concerns.    Humaira Ugalde MD    This note was dictated with Dragon and briefly proofread. Please excuse any sentences that may be unclear or words misspelled

## 2020-10-06 ENCOUNTER — TELEPHONE (OUTPATIENT)
Dept: HEMATOLOGY/ONCOLOGY | Facility: CLINIC | Age: 45
End: 2020-10-06

## 2020-10-06 DIAGNOSIS — D50.9 IRON DEFICIENCY ANEMIA, UNSPECIFIED IRON DEFICIENCY ANEMIA TYPE: Primary | ICD-10-CM

## 2020-10-06 NOTE — TELEPHONE ENCOUNTER
Entered labs for [facility]. Spoke to pt, confirmed US and f/up appt. Pt states she will go to [facility] on Thursday morning.     ----- Message from Humaira Monson MD sent at 10/2/2020  8:41 AM CDT -----  Please send her lab orders to [email or web address] and US of abdomen on [facility]

## 2020-10-07 ENCOUNTER — HOSPITAL ENCOUNTER (OUTPATIENT)
Dept: RADIOLOGY | Facility: HOSPITAL | Age: 45
Discharge: HOME | End: 2020-10-07
Attending: NURSE PRACTITIONER
Payer: COMMERCIAL

## 2020-10-07 ENCOUNTER — TELEPHONE (OUTPATIENT)
Dept: FAMILY MEDICINE | Facility: CLINIC | Age: 45
End: 2020-10-07

## 2020-10-07 DIAGNOSIS — Z12.31 BREAST CANCER SCREENING BY MAMMOGRAM: ICD-10-CM

## 2020-10-07 PROCEDURE — 77067 SCR MAMMO BI INCL CAD: CPT | Mod: TC,PO

## 2020-10-07 NOTE — TELEPHONE ENCOUNTER
----- Message from MICHAEL Ley sent at 10/7/2020  9:11 AM CDT -----  Please call patient. Mammogram looks good. Can continue with yearly screening mammograms.

## 2020-10-07 NOTE — LETTER
December 29, 2020    Haydee Allen  [address]  [facility] [address]  [facility] [facility] [address]    Dear ,    We are pleased to let you know that the results of your recent exam on 10/7/20 shows no sign of breast cancer.     Follow-up and Due Date: Routine Screening Mammogram in 1 year  10/7/2021    Even though mammograms are the best method we have for early detection, not all cancers are found with mammograms.  If you feel a lump, have nipple discharge or have any other reasons for concern, you should promptly tell your health care provider.  We support the American College of Radiology recommendation for annual screening mammograms starting at age of 40 and continuing for as long as you are in good health.                                             A report of your mammography results was sent to: Emily Timmons.          Breast Density: The breasts have scattered areas of fibroglandular density.If your mammogram demonstrates that you have dense breast tissue, which could hide abnormalities, and you have other risk factors for breast cancer that have been identified, you might benefit from supplemental screening tests that may be suggested by your ordering physician. Dense breast tissue, in and of itself, is a relatively common condition. Therefore, this information is not provided to cause undue concern, but rather to raise your awareness and to promote discussion with your physician regarding the presence of other risk factors, in addition to dense breast tissue. A summary of your mammography results will be sent to you, and a full mammography report will be sent to your physician and also to you. You should contact your physician if you have any questions or concerns regarding your summary or report of results.  Your estimated lifetime risk of breast cancer (to age 85) based on Tyrer-Cuzick risk assessment model is 16.62 %.  According to the American Cancer Society, patients with a lifetime breast cancer risk of  20% or higher might benefit from supplemental screening tests. ??     Your images will become part of your medical record at [facility] [address] [place] [address] and they will be on file for your ongoing care. The contact number is [phone].  If, in the future, you change health care providers or go to a different location for a mammogram, you should tell them where and when this mammogram was done.     Thank you for choosing us for your health care needs.  We look forward to continuing to care for you and your family.    Sincerely,  Frankie Anderson MD      CMS MANDATED QUALITY DATA - MAMMOGRAPHY - 225    [facility] utilizes a reminder system to ensure that all   patients receive reminder letters and/or direct phone calls for   appointments. This includes reminders for routine screening mammograms,   diagnostic mammograms, and other breast imaging interventions when   appropriate. This patient will be placed in the appropriate reminder   system.    EXAMINATION:  MAMMO DIGITAL SCREENING BILAT WITH NIESHA    CLINICAL HISTORY:  Encounter for screening mammogram for malignant neoplasm of breast    [facility] lifetime [facility] risk: 17%    TECHNIQUE:  DIGITAL SCREENING MAMMOGRAM WITH TOMOSYNTHESIS AND CAD    COMPARISON:  06/13/2019, 05/13/2019, 04/12/2016    FINDINGS:  Breast Density: Scattered fibroglandular densities.    No suspicious mass, microcalcification, or architectural distortion.   Loosely grouped monomorphic calcifications in upper-outer quadrant of   right breast remains similar to 2019.  Benign calcification again noted in   left breast.    Impression:    Benign mammogram.    Recommendation: Annual screening mammography.    BI-RADS CATEGORY: 2  BENIGN FINDING.    Technologist: JEANMARIE      Electronically signed by: Frankie Anderson MD  Date:    10/07/2020  Time:    08:39

## 2020-10-16 ENCOUNTER — TELEPHONE (OUTPATIENT)
Dept: HEMATOLOGY/ONCOLOGY | Facility: CLINIC | Age: 45
End: 2020-10-16

## 2020-10-16 NOTE — TELEPHONE ENCOUNTER
Spoke to patient to inform that Dr. Monson will be [facility] on 10/20.  Patient still has not had US done.  Patient's US r/s to 10/23.  Instructions given regarding NPO status and arrival time.  Patient's follow up appt with Dr Monson r/s to 10/27/2020 @ 2 PM.  Patient verbalized understanding.  No further questions/concerns noted at this time.

## 2020-10-20 LAB
HAPTOGLOB SERPL-MCNC: 187 MG/DL (ref 43–212)
HGB A2 MFR BLD: 1.7 % (ref 1.8–3.5)
KAPPA LC FREE SER-MCNC: 16.7 MG/L (ref 3.3–19.4)
KAPPA LC FREE/LAMBDA FREE SER: 1.22 {RATIO} (ref 0.26–1.65)
LAMBDA LC FREE SERPL-MCNC: 13.7 MG/L (ref 5.7–26.3)
T3 SERPL-MCNC: 89 NG/DL (ref 76–181)
T4 FREE SERPL-MCNC: 0.8 NG/DL (ref 0.8–1.8)

## 2020-10-21 LAB
FACT VIII ACT/NOR PPP: 152 % NORMAL (ref 50–180)
VIT B1 BLD-SCNC: 113 NMOL/L (ref 78–185)
VIT B6 SERPL-MCNC: 6.4 NG/ML (ref 2.1–21.7)

## 2020-10-22 LAB
ALBUMIN SERPL-MCNC: 4.3 G/DL (ref 3.6–5.1)
ALBUMIN/GLOB SERPL: 1.4 (CALC) (ref 1–2.5)
ALP SERPL-CCNC: 98 U/L (ref 31–125)
ALT SERPL-CCNC: 11 U/L (ref 6–29)
ANA PAT SER IF-IMP: ABNORMAL
ANA SER QL IF: POSITIVE
ANA TITR SER IF: ABNORMAL TITER
AST SERPL-CCNC: 9 U/L (ref 10–35)
BASOPHILS # BLD AUTO: 73 CELLS/UL (ref 0–200)
BASOPHILS NFR BLD AUTO: 1.1 %
BILIRUB SERPL-MCNC: 0.4 MG/DL (ref 0.2–1.2)
BUN SERPL-MCNC: 8 MG/DL (ref 7–25)
BUN/CREAT SERPL: ABNORMAL (CALC) (ref 6–22)
CALCIUM SERPL-MCNC: 9 MG/DL (ref 8.6–10.2)
CHLORIDE SERPL-SCNC: 99 MMOL/L (ref 98–110)
CO2 SERPL-SCNC: 27 MMOL/L (ref 20–32)
CREAT SERPL-MCNC: 0.62 MG/DL (ref 0.5–1.1)
EOSINOPHIL # BLD AUTO: 59 CELLS/UL (ref 15–500)
EOSINOPHIL NFR BLD AUTO: 0.9 %
ERYTHROCYTE [DISTWIDTH] IN BLOOD BY AUTOMATED COUNT: 17.6 % (ref 11–15)
G6PD RBC-CCNT: >21 U/G HGB (ref 7–20.5)
GFRSERPLBLD MDRD-ARVRAT: 107 ML/MIN/1.73M2
GLOBULIN SER CALC-MCNC: 3.1 G/DL (CALC) (ref 1.9–3.7)
GLUCOSE SERPL-MCNC: 312 MG/DL (ref 65–99)
HCT VFR BLD AUTO: 29.5 % (ref 35–45)
HGB BLD-MCNC: 8 G/DL (ref 11.7–15.5)
IGA SERPL-MCNC: 276 MG/DL (ref 47–310)
IGG SERPL-MCNC: 1221 MG/DL (ref 600–1640)
IGM SERPL-MCNC: 285 MG/DL (ref 50–300)
IRON SATN MFR SERPL: 5 % (CALC) (ref 16–45)
IRON SERPL-MCNC: 23 MCG/DL (ref 40–190)
LYMPHOCYTES # BLD AUTO: 1637 CELLS/UL (ref 850–3900)
LYMPHOCYTES NFR BLD AUTO: 24.8 %
MCH RBC QN AUTO: 18.3 PG (ref 27–33)
MCHC RBC AUTO-ENTMCNC: 27.1 G/DL (ref 32–36)
MCV RBC AUTO: 67.4 FL (ref 80–100)
METHYLMALONATE SERPL-SCNC: 169 NMOL/L (ref 87–318)
MONOCYTES # BLD AUTO: 218 CELLS/UL (ref 200–950)
MONOCYTES NFR BLD AUTO: 3.3 %
NEUTROPHILS # BLD AUTO: 4613 CELLS/UL (ref 1500–7800)
NEUTROPHILS NFR BLD AUTO: 69.9 %
PLATELET # BLD AUTO: 238 THOUSAND/UL (ref 140–400)
PMV BLD REES-ECKER: ABNORMAL FL (ref 7.5–12.5)
POTASSIUM SERPL-SCNC: 3.9 MMOL/L (ref 3.5–5.3)
PROT SERPL-MCNC: 7.4 G/DL (ref 6.1–8.1)
RBC # BLD AUTO: 4.38 MILLION/UL (ref 3.8–5.1)
RETICS #: NORMAL CELLS/UL (ref 20000–80000)
RETICS/RBC NFR AUTO: 1 %
SODIUM SERPL-SCNC: 137 MMOL/L (ref 135–146)
TIBC SERPL-MCNC: 420 MCG/DL (CALC) (ref 250–450)
TSH SERPL-ACNC: 17.1 MIU/L
WBC # BLD AUTO: 6.6 THOUSAND/UL (ref 3.8–10.8)

## 2020-10-27 ENCOUNTER — TELEPHONE (OUTPATIENT)
Dept: HEMATOLOGY/ONCOLOGY | Facility: CLINIC | Age: 45
End: 2020-10-27

## 2020-11-16 ENCOUNTER — TELEPHONE (OUTPATIENT)
Dept: FAMILY MEDICINE | Facility: CLINIC | Age: 45
End: 2020-11-16

## 2020-12-08 ENCOUNTER — PATIENT MESSAGE (OUTPATIENT)
Dept: FAMILY MEDICINE | Facility: CLINIC | Age: 45
End: 2020-12-08

## 2020-12-15 ENCOUNTER — TELEPHONE (OUTPATIENT)
Dept: FAMILY MEDICINE | Facility: CLINIC | Age: 45
End: 2020-12-15

## 2020-12-15 DIAGNOSIS — E11.42 TYPE 2 DIABETES MELLITUS WITH DIABETIC POLYNEUROPATHY, WITHOUT LONG-TERM CURRENT USE OF INSULIN: ICD-10-CM

## 2020-12-15 RX ORDER — METFORMIN HYDROCHLORIDE 500 MG/1
1000 TABLET, EXTENDED RELEASE ORAL
Qty: 60 TABLET | Refills: 0 | Status: SHIPPED | OUTPATIENT
Start: 2020-12-15 | End: 2021-01-12 | Stop reason: SDUPTHER

## 2020-12-15 RX ORDER — INSULIN GLARGINE 100 [IU]/ML
10 INJECTION, SOLUTION SUBCUTANEOUS NIGHTLY
Qty: 2 SYRINGE | Refills: 0 | Status: SHIPPED | OUTPATIENT
Start: 2020-12-15 | End: 2021-01-12 | Stop reason: SDUPTHER

## 2020-12-15 NOTE — TELEPHONE ENCOUNTER
----- Message from Olga Garber sent at 12/15/2020 12:53 PM CST -----  Patient was discharged in December but is requesting insulin refill.

## 2020-12-16 ENCOUNTER — TELEPHONE (OUTPATIENT)
Dept: HEMATOLOGY/ONCOLOGY | Facility: CLINIC | Age: 45
End: 2020-12-16

## 2020-12-16 DIAGNOSIS — D50.9 IRON DEFICIENCY ANEMIA, UNSPECIFIED IRON DEFICIENCY ANEMIA TYPE: Primary | ICD-10-CM

## 2020-12-16 NOTE — TELEPHONE ENCOUNTER
Spoke with patient over phone and re-scheduled her to see Dr. Monson. Patient getting labs done at [facility] 5-7 days before ov appt. Mailed appt and lab slips to pt per patient request. Pt confirmed most up to date address in system. No further questions or concerns at this time.

## 2021-01-08 ENCOUNTER — LAB VISIT (OUTPATIENT)
Dept: LAB | Facility: HOSPITAL | Age: 46
End: 2021-01-08
Attending: NURSE PRACTITIONER
Payer: COMMERCIAL

## 2021-01-08 ENCOUNTER — OFFICE VISIT (OUTPATIENT)
Dept: FAMILY MEDICINE | Facility: CLINIC | Age: 46
End: 2021-01-08
Payer: COMMERCIAL

## 2021-01-08 VITALS
HEIGHT: 66 IN | TEMPERATURE: 98 F | DIASTOLIC BLOOD PRESSURE: 78 MMHG | WEIGHT: 243.63 LBS | SYSTOLIC BLOOD PRESSURE: 120 MMHG | OXYGEN SATURATION: 99 % | BODY MASS INDEX: 39.15 KG/M2 | HEART RATE: 97 BPM

## 2021-01-08 DIAGNOSIS — Z01.00 DIABETIC EYE EXAM: ICD-10-CM

## 2021-01-08 DIAGNOSIS — E11.42 TYPE 2 DIABETES MELLITUS WITH DIABETIC POLYNEUROPATHY, WITHOUT LONG-TERM CURRENT USE OF INSULIN: ICD-10-CM

## 2021-01-08 DIAGNOSIS — E78.5 DYSLIPIDEMIA: ICD-10-CM

## 2021-01-08 DIAGNOSIS — I10 ESSENTIAL HYPERTENSION: ICD-10-CM

## 2021-01-08 DIAGNOSIS — E03.9 ACQUIRED HYPOTHYROIDISM: ICD-10-CM

## 2021-01-08 DIAGNOSIS — B37.89 CANDIDIASIS OF BREAST: ICD-10-CM

## 2021-01-08 DIAGNOSIS — E66.9 OBESITY (BMI 30-39.9): ICD-10-CM

## 2021-01-08 DIAGNOSIS — E11.9 DIABETIC EYE EXAM: ICD-10-CM

## 2021-01-08 DIAGNOSIS — E11.42 DIABETIC POLYNEUROPATHY ASSOCIATED WITH TYPE 2 DIABETES MELLITUS: Primary | ICD-10-CM

## 2021-01-08 LAB
ALBUMIN SERPL BCP-MCNC: 3.9 G/DL (ref 3.5–5.2)
ALP SERPL-CCNC: 103 U/L (ref 55–135)
ALT SERPL W/O P-5'-P-CCNC: 10 U/L (ref 10–44)
ANION GAP SERPL CALC-SCNC: 11 MMOL/L (ref 8–16)
ANISOCYTOSIS BLD QL SMEAR: SLIGHT
AST SERPL-CCNC: 9 U/L (ref 10–40)
BASOPHILS # BLD AUTO: 0.07 K/UL (ref 0–0.2)
BASOPHILS NFR BLD: 0.7 % (ref 0–1.9)
BILIRUB SERPL-MCNC: 0.3 MG/DL (ref 0.1–1)
BUN SERPL-MCNC: 12 MG/DL (ref 6–20)
CALCIUM SERPL-MCNC: 9 MG/DL (ref 8.7–10.5)
CHLORIDE SERPL-SCNC: 101 MMOL/L (ref 95–110)
CHOLEST SERPL-MCNC: 144 MG/DL (ref 120–199)
CHOLEST/HDLC SERPL: 3.5 {RATIO} (ref 2–5)
CO2 SERPL-SCNC: 28 MMOL/L (ref 23–29)
CREAT SERPL-MCNC: 0.8 MG/DL (ref 0.5–1.4)
DACRYOCYTES BLD QL SMEAR: ABNORMAL
DIFFERENTIAL METHOD: ABNORMAL
EOSINOPHIL # BLD AUTO: 0.2 K/UL (ref 0–0.5)
EOSINOPHIL NFR BLD: 2.1 % (ref 0–8)
ERYTHROCYTE [DISTWIDTH] IN BLOOD BY AUTOMATED COUNT: 19.5 % (ref 11.5–14.5)
EST. GFR  (AFRICAN AMERICAN): >60 ML/MIN/1.73 M^2
EST. GFR  (NON AFRICAN AMERICAN): >60 ML/MIN/1.73 M^2
GIANT PLATELETS BLD QL SMEAR: PRESENT
GLUCOSE SERPL-MCNC: 311 MG/DL (ref 70–110)
HCT VFR BLD AUTO: 28.2 % (ref 37–48.5)
HDLC SERPL-MCNC: 41 MG/DL (ref 40–75)
HDLC SERPL: 28.5 % (ref 20–50)
HGB BLD-MCNC: 7.7 G/DL (ref 12–16)
HYPOCHROMIA BLD QL SMEAR: ABNORMAL
IMM GRANULOCYTES # BLD AUTO: 0.02 K/UL (ref 0–0.04)
IMM GRANULOCYTES NFR BLD AUTO: 0.2 % (ref 0–0.5)
LDLC SERPL CALC-MCNC: 78.2 MG/DL (ref 63–159)
LYMPHOCYTES # BLD AUTO: 2.2 K/UL (ref 1–4.8)
LYMPHOCYTES NFR BLD: 23.4 % (ref 18–48)
MCH RBC QN AUTO: 18 PG (ref 27–31)
MCHC RBC AUTO-ENTMCNC: 27.3 G/DL (ref 32–36)
MCV RBC AUTO: 66 FL (ref 82–98)
MONOCYTES # BLD AUTO: 0.5 K/UL (ref 0.3–1)
MONOCYTES NFR BLD: 4.8 % (ref 4–15)
NEUTROPHILS # BLD AUTO: 6.6 K/UL (ref 1.8–7.7)
NEUTROPHILS NFR BLD: 68.8 % (ref 38–73)
NONHDLC SERPL-MCNC: 103 MG/DL
NRBC BLD-RTO: 0 /100 WBC
OVALOCYTES BLD QL SMEAR: ABNORMAL
PLATELET # BLD AUTO: 229 K/UL (ref 150–350)
PLATELET BLD QL SMEAR: ABNORMAL
PMV BLD AUTO: ABNORMAL FL (ref 9.2–12.9)
POIKILOCYTOSIS BLD QL SMEAR: SLIGHT
POLYCHROMASIA BLD QL SMEAR: ABNORMAL
POTASSIUM SERPL-SCNC: 3.5 MMOL/L (ref 3.5–5.1)
PROT SERPL-MCNC: 7.6 G/DL (ref 6–8.4)
RBC # BLD AUTO: 4.28 M/UL (ref 4–5.4)
SODIUM SERPL-SCNC: 140 MMOL/L (ref 136–145)
TARGETS BLD QL SMEAR: ABNORMAL
TRIGL SERPL-MCNC: 124 MG/DL (ref 30–150)
WBC # BLD AUTO: 9.59 K/UL (ref 3.9–12.7)

## 2021-01-08 PROCEDURE — 84439 ASSAY OF FREE THYROXINE: CPT

## 2021-01-08 PROCEDURE — 36415 COLL VENOUS BLD VENIPUNCTURE: CPT | Mod: PO

## 2021-01-08 PROCEDURE — 99999 PR PBB SHADOW E&M-EST. PATIENT-LVL V: CPT | Mod: PBBFAC,,, | Performed by: NURSE PRACTITIONER

## 2021-01-08 PROCEDURE — 83036 HEMOGLOBIN GLYCOSYLATED A1C: CPT

## 2021-01-08 PROCEDURE — 3078F DIAST BP <80 MM HG: CPT | Mod: CPTII,S$GLB,, | Performed by: NURSE PRACTITIONER

## 2021-01-08 PROCEDURE — 3046F PR MOST RECENT HEMOGLOBIN A1C LEVEL > 9.0%: ICD-10-PCS | Mod: CPTII,S$GLB,, | Performed by: NURSE PRACTITIONER

## 2021-01-08 PROCEDURE — 3008F BODY MASS INDEX DOCD: CPT | Mod: CPTII,S$GLB,, | Performed by: NURSE PRACTITIONER

## 2021-01-08 PROCEDURE — 84443 ASSAY THYROID STIM HORMONE: CPT

## 2021-01-08 PROCEDURE — 80053 COMPREHEN METABOLIC PANEL: CPT

## 2021-01-08 PROCEDURE — 1125F PR PAIN SEVERITY QUANTIFIED, PAIN PRESENT: ICD-10-PCS | Mod: S$GLB,,, | Performed by: NURSE PRACTITIONER

## 2021-01-08 PROCEDURE — 1125F AMNT PAIN NOTED PAIN PRSNT: CPT | Mod: S$GLB,,, | Performed by: NURSE PRACTITIONER

## 2021-01-08 PROCEDURE — 3046F HEMOGLOBIN A1C LEVEL >9.0%: CPT | Mod: CPTII,S$GLB,, | Performed by: NURSE PRACTITIONER

## 2021-01-08 PROCEDURE — 99204 OFFICE O/P NEW MOD 45 MIN: CPT | Mod: S$GLB,,, | Performed by: NURSE PRACTITIONER

## 2021-01-08 PROCEDURE — 99999 PR PBB SHADOW E&M-EST. PATIENT-LVL V: ICD-10-PCS | Mod: PBBFAC,,, | Performed by: NURSE PRACTITIONER

## 2021-01-08 PROCEDURE — 80061 LIPID PANEL: CPT

## 2021-01-08 PROCEDURE — 3074F PR MOST RECENT SYSTOLIC BLOOD PRESSURE < 130 MM HG: ICD-10-PCS | Mod: CPTII,S$GLB,, | Performed by: NURSE PRACTITIONER

## 2021-01-08 PROCEDURE — 3008F PR BODY MASS INDEX (BMI) DOCUMENTED: ICD-10-PCS | Mod: CPTII,S$GLB,, | Performed by: NURSE PRACTITIONER

## 2021-01-08 PROCEDURE — 85025 COMPLETE CBC W/AUTO DIFF WBC: CPT

## 2021-01-08 PROCEDURE — 3074F SYST BP LT 130 MM HG: CPT | Mod: CPTII,S$GLB,, | Performed by: NURSE PRACTITIONER

## 2021-01-08 PROCEDURE — 3078F PR MOST RECENT DIASTOLIC BLOOD PRESSURE < 80 MM HG: ICD-10-PCS | Mod: CPTII,S$GLB,, | Performed by: NURSE PRACTITIONER

## 2021-01-08 PROCEDURE — 99204 PR OFFICE/OUTPT VISIT, NEW, LEVL IV, 45-59 MIN: ICD-10-PCS | Mod: S$GLB,,, | Performed by: NURSE PRACTITIONER

## 2021-01-08 RX ORDER — GABAPENTIN 300 MG/1
300 CAPSULE ORAL 3 TIMES DAILY
Qty: 90 CAPSULE | Refills: 11 | Status: SHIPPED | OUTPATIENT
Start: 2021-01-08 | End: 2022-02-10 | Stop reason: SDUPTHER

## 2021-01-08 RX ORDER — NYSTATIN 100000 U/G
OINTMENT TOPICAL 2 TIMES DAILY
Qty: 30 G | Refills: 2 | OUTPATIENT
Start: 2021-01-08 | End: 2023-09-04

## 2021-01-09 LAB
ESTIMATED AVG GLUCOSE: 303 MG/DL (ref 68–131)
HBA1C MFR BLD HPLC: 12.2 % (ref 4–5.6)
T4 FREE SERPL-MCNC: 1 NG/DL (ref 0.71–1.51)
TSH SERPL DL<=0.005 MIU/L-ACNC: 6.65 UIU/ML (ref 0.4–4)

## 2021-01-12 ENCOUNTER — OFFICE VISIT (OUTPATIENT)
Dept: ENDOCRINOLOGY | Facility: CLINIC | Age: 46
End: 2021-01-12
Payer: COMMERCIAL

## 2021-01-12 ENCOUNTER — TELEPHONE (OUTPATIENT)
Dept: HEMATOLOGY/ONCOLOGY | Facility: CLINIC | Age: 46
End: 2021-01-12

## 2021-01-12 VITALS
WEIGHT: 241.38 LBS | SYSTOLIC BLOOD PRESSURE: 120 MMHG | DIASTOLIC BLOOD PRESSURE: 80 MMHG | BODY MASS INDEX: 38.79 KG/M2 | HEART RATE: 84 BPM | TEMPERATURE: 98 F | OXYGEN SATURATION: 98 % | HEIGHT: 66 IN

## 2021-01-12 DIAGNOSIS — E03.9 ACQUIRED HYPOTHYROIDISM: ICD-10-CM

## 2021-01-12 DIAGNOSIS — I10 ESSENTIAL HYPERTENSION: ICD-10-CM

## 2021-01-12 DIAGNOSIS — Z79.4 TYPE 2 DIABETES MELLITUS WITH HYPERGLYCEMIA, WITH LONG-TERM CURRENT USE OF INSULIN: Primary | ICD-10-CM

## 2021-01-12 DIAGNOSIS — E11.65 TYPE 2 DIABETES MELLITUS WITH HYPERGLYCEMIA, WITH LONG-TERM CURRENT USE OF INSULIN: Primary | ICD-10-CM

## 2021-01-12 DIAGNOSIS — E66.01 CLASS 2 SEVERE OBESITY DUE TO EXCESS CALORIES WITH SERIOUS COMORBIDITY AND BODY MASS INDEX (BMI) OF 38.0 TO 38.9 IN ADULT: ICD-10-CM

## 2021-01-12 DIAGNOSIS — E78.5 DYSLIPIDEMIA: ICD-10-CM

## 2021-01-12 DIAGNOSIS — E11.42 TYPE 2 DIABETES MELLITUS WITH DIABETIC POLYNEUROPATHY, WITHOUT LONG-TERM CURRENT USE OF INSULIN: ICD-10-CM

## 2021-01-12 PROCEDURE — 1126F AMNT PAIN NOTED NONE PRSNT: CPT | Mod: S$GLB,,, | Performed by: INTERNAL MEDICINE

## 2021-01-12 PROCEDURE — 1126F PR PAIN SEVERITY QUANTIFIED, NO PAIN PRESENT: ICD-10-PCS | Mod: S$GLB,,, | Performed by: INTERNAL MEDICINE

## 2021-01-12 PROCEDURE — 3008F BODY MASS INDEX DOCD: CPT | Mod: CPTII,S$GLB,, | Performed by: INTERNAL MEDICINE

## 2021-01-12 PROCEDURE — 99999 PR PBB SHADOW E&M-EST. PATIENT-LVL V: ICD-10-PCS | Mod: PBBFAC,,, | Performed by: INTERNAL MEDICINE

## 2021-01-12 PROCEDURE — 3008F PR BODY MASS INDEX (BMI) DOCUMENTED: ICD-10-PCS | Mod: CPTII,S$GLB,, | Performed by: INTERNAL MEDICINE

## 2021-01-12 PROCEDURE — 99204 PR OFFICE/OUTPT VISIT, NEW, LEVL IV, 45-59 MIN: ICD-10-PCS | Mod: S$GLB,,, | Performed by: INTERNAL MEDICINE

## 2021-01-12 PROCEDURE — 99204 OFFICE O/P NEW MOD 45 MIN: CPT | Mod: S$GLB,,, | Performed by: INTERNAL MEDICINE

## 2021-01-12 PROCEDURE — 99999 PR PBB SHADOW E&M-EST. PATIENT-LVL V: CPT | Mod: PBBFAC,,, | Performed by: INTERNAL MEDICINE

## 2021-01-12 RX ORDER — METFORMIN HYDROCHLORIDE 500 MG/1
1000 TABLET, EXTENDED RELEASE ORAL
Qty: 60 TABLET | Refills: 11 | Status: SHIPPED | OUTPATIENT
Start: 2021-01-12 | End: 2022-01-24

## 2021-01-12 RX ORDER — FLASH GLUCOSE SENSOR
1 KIT MISCELLANEOUS
Qty: 2 KIT | Refills: 11 | Status: SHIPPED | OUTPATIENT
Start: 2021-01-12 | End: 2021-03-17 | Stop reason: SDUPTHER

## 2021-01-12 RX ORDER — FLASH GLUCOSE SCANNING READER
EACH MISCELLANEOUS
Qty: 1 EACH | Refills: 0 | Status: SHIPPED | OUTPATIENT
Start: 2021-01-12 | End: 2021-03-17

## 2021-01-12 RX ORDER — GLIPIZIDE 5 MG/1
5 TABLET, FILM COATED, EXTENDED RELEASE ORAL
Qty: 90 TABLET | Refills: 3 | Status: SHIPPED | OUTPATIENT
Start: 2021-01-12 | End: 2022-04-22 | Stop reason: SDUPTHER

## 2021-01-12 RX ORDER — INSULIN GLARGINE 100 [IU]/ML
15 INJECTION, SOLUTION SUBCUTANEOUS NIGHTLY
Qty: 5 SYRINGE | Refills: 11 | Status: SHIPPED | OUTPATIENT
Start: 2021-01-12 | End: 2022-01-24

## 2021-01-12 RX ORDER — ATORVASTATIN CALCIUM 10 MG/1
10 TABLET, FILM COATED ORAL DAILY
Qty: 90 TABLET | Refills: 3 | Status: SHIPPED | OUTPATIENT
Start: 2021-01-12 | End: 2022-01-24

## 2021-01-12 RX ORDER — BLOOD SUGAR DIAGNOSTIC
1 STRIP MISCELLANEOUS 2 TIMES DAILY
Qty: 100 EACH | Refills: 11 | Status: SHIPPED | OUTPATIENT
Start: 2021-01-12 | End: 2022-01-24

## 2021-01-12 RX ORDER — LEVOTHYROXINE SODIUM 200 UG/1
200 TABLET ORAL
Qty: 102 TABLET | Refills: 3 | Status: SHIPPED | OUTPATIENT
Start: 2021-01-12 | End: 2022-04-22 | Stop reason: SDUPTHER

## 2021-01-19 DIAGNOSIS — D50.9 IRON DEFICIENCY ANEMIA, UNSPECIFIED IRON DEFICIENCY ANEMIA TYPE: Primary | ICD-10-CM

## 2021-01-19 RX ORDER — FERROUS SULFATE 325(65) MG
325 TABLET ORAL 2 TIMES DAILY
Qty: 180 TABLET | Refills: 0 | Status: SHIPPED | OUTPATIENT
Start: 2021-01-19 | End: 2021-04-19

## 2021-01-26 ENCOUNTER — TELEPHONE (OUTPATIENT)
Dept: HEMATOLOGY/ONCOLOGY | Facility: CLINIC | Age: 46
End: 2021-01-26

## 2021-02-01 ENCOUNTER — PATIENT MESSAGE (OUTPATIENT)
Dept: RHEUMATOLOGY | Facility: CLINIC | Age: 46
End: 2021-02-01

## 2021-02-08 ENCOUNTER — TELEPHONE (OUTPATIENT)
Dept: HEMATOLOGY/ONCOLOGY | Facility: CLINIC | Age: 46
End: 2021-02-08

## 2021-02-11 ENCOUNTER — TELEPHONE (OUTPATIENT)
Dept: HEMATOLOGY/ONCOLOGY | Facility: CLINIC | Age: 46
End: 2021-02-11

## 2021-03-10 ENCOUNTER — TELEPHONE (OUTPATIENT)
Dept: ENDOCRINOLOGY | Facility: CLINIC | Age: 46
End: 2021-03-10

## 2021-03-10 ENCOUNTER — OFFICE VISIT (OUTPATIENT)
Dept: FAMILY MEDICINE | Facility: CLINIC | Age: 46
End: 2021-03-10
Payer: COMMERCIAL

## 2021-03-10 VITALS
SYSTOLIC BLOOD PRESSURE: 142 MMHG | HEIGHT: 66 IN | RESPIRATION RATE: 18 BRPM | BODY MASS INDEX: 38.51 KG/M2 | DIASTOLIC BLOOD PRESSURE: 94 MMHG | OXYGEN SATURATION: 98 % | TEMPERATURE: 98 F | HEART RATE: 88 BPM | WEIGHT: 239.63 LBS

## 2021-03-10 DIAGNOSIS — F32.9 REACTIVE DEPRESSION: Primary | ICD-10-CM

## 2021-03-10 DIAGNOSIS — F41.9 ANXIETY AND DEPRESSION: ICD-10-CM

## 2021-03-10 DIAGNOSIS — Z79.4 TYPE 2 DIABETES MELLITUS WITH HYPERGLYCEMIA, WITH LONG-TERM CURRENT USE OF INSULIN: ICD-10-CM

## 2021-03-10 DIAGNOSIS — I10 ESSENTIAL HYPERTENSION: ICD-10-CM

## 2021-03-10 DIAGNOSIS — E11.42 TYPE 2 DIABETES MELLITUS WITH DIABETIC POLYNEUROPATHY, WITHOUT LONG-TERM CURRENT USE OF INSULIN: ICD-10-CM

## 2021-03-10 DIAGNOSIS — E66.9 OBESITY, CLASS II, BMI 35-39.9: ICD-10-CM

## 2021-03-10 DIAGNOSIS — E11.65 TYPE 2 DIABETES MELLITUS WITH HYPERGLYCEMIA, WITH LONG-TERM CURRENT USE OF INSULIN: ICD-10-CM

## 2021-03-10 DIAGNOSIS — F32.A ANXIETY AND DEPRESSION: ICD-10-CM

## 2021-03-10 PROCEDURE — 99999 PR PBB SHADOW E&M-EST. PATIENT-LVL V: CPT | Mod: PBBFAC,,, | Performed by: FAMILY MEDICINE

## 2021-03-10 PROCEDURE — 3046F PR MOST RECENT HEMOGLOBIN A1C LEVEL > 9.0%: ICD-10-PCS | Mod: CPTII,S$GLB,, | Performed by: FAMILY MEDICINE

## 2021-03-10 PROCEDURE — 99999 PR PBB SHADOW E&M-EST. PATIENT-LVL V: ICD-10-PCS | Mod: PBBFAC,,, | Performed by: FAMILY MEDICINE

## 2021-03-10 PROCEDURE — 99214 PR OFFICE/OUTPT VISIT, EST, LEVL IV, 30-39 MIN: ICD-10-PCS | Mod: S$GLB,,, | Performed by: FAMILY MEDICINE

## 2021-03-10 PROCEDURE — 3008F BODY MASS INDEX DOCD: CPT | Mod: CPTII,S$GLB,, | Performed by: FAMILY MEDICINE

## 2021-03-10 PROCEDURE — 3080F PR MOST RECENT DIASTOLIC BLOOD PRESSURE >= 90 MM HG: ICD-10-PCS | Mod: CPTII,S$GLB,, | Performed by: FAMILY MEDICINE

## 2021-03-10 PROCEDURE — 3046F HEMOGLOBIN A1C LEVEL >9.0%: CPT | Mod: CPTII,S$GLB,, | Performed by: FAMILY MEDICINE

## 2021-03-10 PROCEDURE — 99214 OFFICE O/P EST MOD 30 MIN: CPT | Mod: S$GLB,,, | Performed by: FAMILY MEDICINE

## 2021-03-10 PROCEDURE — 3008F PR BODY MASS INDEX (BMI) DOCUMENTED: ICD-10-PCS | Mod: CPTII,S$GLB,, | Performed by: FAMILY MEDICINE

## 2021-03-10 PROCEDURE — 1125F AMNT PAIN NOTED PAIN PRSNT: CPT | Mod: S$GLB,,, | Performed by: FAMILY MEDICINE

## 2021-03-10 PROCEDURE — 1125F PR PAIN SEVERITY QUANTIFIED, PAIN PRESENT: ICD-10-PCS | Mod: S$GLB,,, | Performed by: FAMILY MEDICINE

## 2021-03-10 PROCEDURE — 3077F PR MOST RECENT SYSTOLIC BLOOD PRESSURE >= 140 MM HG: ICD-10-PCS | Mod: CPTII,S$GLB,, | Performed by: FAMILY MEDICINE

## 2021-03-10 PROCEDURE — 3077F SYST BP >= 140 MM HG: CPT | Mod: CPTII,S$GLB,, | Performed by: FAMILY MEDICINE

## 2021-03-10 PROCEDURE — 3080F DIAST BP >= 90 MM HG: CPT | Mod: CPTII,S$GLB,, | Performed by: FAMILY MEDICINE

## 2021-03-10 RX ORDER — CLONAZEPAM 0.25 MG/1
0.25 TABLET, ORALLY DISINTEGRATING ORAL 2 TIMES DAILY PRN
Qty: 60 TABLET | Refills: 1 | Status: SHIPPED | OUTPATIENT
Start: 2021-03-10 | End: 2021-03-30

## 2021-03-10 RX ORDER — SERTRALINE HYDROCHLORIDE 100 MG/1
150 TABLET, FILM COATED ORAL DAILY
Qty: 135 TABLET | Refills: 3 | Status: SHIPPED | OUTPATIENT
Start: 2021-03-10 | End: 2021-03-30

## 2021-03-15 ENCOUNTER — TELEPHONE (OUTPATIENT)
Dept: FAMILY MEDICINE | Facility: CLINIC | Age: 46
End: 2021-03-15

## 2021-03-15 ENCOUNTER — TELEPHONE (OUTPATIENT)
Dept: DIABETES | Facility: CLINIC | Age: 46
End: 2021-03-15

## 2021-03-15 DIAGNOSIS — I10 ESSENTIAL HYPERTENSION: ICD-10-CM

## 2021-03-15 RX ORDER — AMLODIPINE BESYLATE 10 MG/1
TABLET ORAL
Qty: 90 TABLET | Refills: 0 | OUTPATIENT
Start: 2021-03-15

## 2021-03-15 RX ORDER — HYDROCHLOROTHIAZIDE 12.5 MG/1
TABLET ORAL
Qty: 90 TABLET | Refills: 0 | OUTPATIENT
Start: 2021-03-15

## 2021-03-17 RX ORDER — FLASH GLUCOSE SCANNING READER
EACH MISCELLANEOUS
Qty: 1 EACH | Refills: 0 | Status: SHIPPED | OUTPATIENT
Start: 2021-03-17 | End: 2022-02-07 | Stop reason: SDUPTHER

## 2021-03-17 RX ORDER — FLASH GLUCOSE SENSOR
1 KIT MISCELLANEOUS
Qty: 2 KIT | Refills: 11 | Status: SHIPPED | OUTPATIENT
Start: 2021-03-17 | End: 2022-02-07 | Stop reason: SDUPTHER

## 2021-03-24 ENCOUNTER — TELEPHONE (OUTPATIENT)
Dept: FAMILY MEDICINE | Facility: CLINIC | Age: 46
End: 2021-03-24

## 2021-03-30 ENCOUNTER — OFFICE VISIT (OUTPATIENT)
Dept: PSYCHIATRY | Facility: CLINIC | Age: 46
End: 2021-03-30
Payer: COMMERCIAL

## 2021-03-30 VITALS
BODY MASS INDEX: 37.84 KG/M2 | SYSTOLIC BLOOD PRESSURE: 126 MMHG | HEART RATE: 76 BPM | DIASTOLIC BLOOD PRESSURE: 86 MMHG | HEIGHT: 66 IN | WEIGHT: 235.44 LBS

## 2021-03-30 DIAGNOSIS — F41.9 ANXIETY: ICD-10-CM

## 2021-03-30 DIAGNOSIS — F51.05 INSOMNIA DUE TO OTHER MENTAL DISORDER: ICD-10-CM

## 2021-03-30 DIAGNOSIS — F43.21 ADJUSTMENT DISORDER WITH DEPRESSED MOOD: Primary | ICD-10-CM

## 2021-03-30 DIAGNOSIS — F99 INSOMNIA DUE TO OTHER MENTAL DISORDER: ICD-10-CM

## 2021-03-30 PROCEDURE — 1126F PR PAIN SEVERITY QUANTIFIED, NO PAIN PRESENT: ICD-10-PCS | Mod: S$GLB,,, | Performed by: REGISTERED NURSE

## 2021-03-30 PROCEDURE — 3008F PR BODY MASS INDEX (BMI) DOCUMENTED: ICD-10-PCS | Mod: CPTII,S$GLB,, | Performed by: REGISTERED NURSE

## 2021-03-30 PROCEDURE — 1126F AMNT PAIN NOTED NONE PRSNT: CPT | Mod: S$GLB,,, | Performed by: REGISTERED NURSE

## 2021-03-30 PROCEDURE — 99999 PR PBB SHADOW E&M-EST. PATIENT-LVL V: CPT | Mod: PBBFAC,,, | Performed by: REGISTERED NURSE

## 2021-03-30 PROCEDURE — 99999 PR PBB SHADOW E&M-EST. PATIENT-LVL V: ICD-10-PCS | Mod: PBBFAC,,, | Performed by: REGISTERED NURSE

## 2021-03-30 PROCEDURE — 90792 PSYCH DIAG EVAL W/MED SRVCS: CPT | Mod: S$GLB,,, | Performed by: REGISTERED NURSE

## 2021-03-30 PROCEDURE — 90792 PR PSYCHIATRIC DIAGNOSTIC EVALUATION W/MEDICAL SERVICES: ICD-10-PCS | Mod: S$GLB,,, | Performed by: REGISTERED NURSE

## 2021-03-30 PROCEDURE — 3008F BODY MASS INDEX DOCD: CPT | Mod: CPTII,S$GLB,, | Performed by: REGISTERED NURSE

## 2021-03-30 RX ORDER — CLONAZEPAM 0.5 MG/1
0.5 TABLET ORAL 2 TIMES DAILY PRN
Qty: 60 TABLET | Refills: 0 | Status: SHIPPED | OUTPATIENT
Start: 2021-03-30 | End: 2021-04-13 | Stop reason: SDUPTHER

## 2021-03-30 RX ORDER — ESCITALOPRAM OXALATE 10 MG/1
10 TABLET ORAL DAILY
Qty: 30 TABLET | Refills: 0 | Status: SHIPPED | OUTPATIENT
Start: 2021-03-30 | End: 2021-04-13 | Stop reason: SDUPTHER

## 2021-04-01 ENCOUNTER — TELEPHONE (OUTPATIENT)
Dept: FAMILY MEDICINE | Facility: CLINIC | Age: 46
End: 2021-04-01

## 2021-04-01 ENCOUNTER — TELEPHONE (OUTPATIENT)
Dept: PSYCHIATRY | Facility: CLINIC | Age: 46
End: 2021-04-01

## 2021-04-08 ENCOUNTER — TELEPHONE (OUTPATIENT)
Dept: PSYCHIATRY | Facility: CLINIC | Age: 46
End: 2021-04-08

## 2021-04-11 ENCOUNTER — PATIENT MESSAGE (OUTPATIENT)
Dept: PSYCHIATRY | Facility: CLINIC | Age: 46
End: 2021-04-11

## 2021-04-12 ENCOUNTER — TELEPHONE (OUTPATIENT)
Dept: PSYCHIATRY | Facility: CLINIC | Age: 46
End: 2021-04-12

## 2021-04-13 ENCOUNTER — OFFICE VISIT (OUTPATIENT)
Dept: PSYCHIATRY | Facility: CLINIC | Age: 46
End: 2021-04-13
Payer: COMMERCIAL

## 2021-04-13 DIAGNOSIS — F51.05 INSOMNIA DUE TO OTHER MENTAL DISORDER: Primary | ICD-10-CM

## 2021-04-13 DIAGNOSIS — F41.9 ANXIETY: ICD-10-CM

## 2021-04-13 DIAGNOSIS — F43.21 ADJUSTMENT DISORDER WITH DEPRESSED MOOD: ICD-10-CM

## 2021-04-13 DIAGNOSIS — F99 INSOMNIA DUE TO OTHER MENTAL DISORDER: Primary | ICD-10-CM

## 2021-04-13 PROCEDURE — 99214 OFFICE O/P EST MOD 30 MIN: CPT | Mod: 95,,, | Performed by: REGISTERED NURSE

## 2021-04-13 PROCEDURE — 99214 PR OFFICE/OUTPT VISIT, EST, LEVL IV, 30-39 MIN: ICD-10-PCS | Mod: 95,,, | Performed by: REGISTERED NURSE

## 2021-04-13 RX ORDER — TRAZODONE HYDROCHLORIDE 50 MG/1
50 TABLET ORAL NIGHTLY
Qty: 30 TABLET | Refills: 11 | Status: SHIPPED | OUTPATIENT
Start: 2021-04-13 | End: 2022-02-22 | Stop reason: SDUPTHER

## 2021-04-13 RX ORDER — CLONAZEPAM 0.5 MG/1
0.5 TABLET ORAL 2 TIMES DAILY PRN
Qty: 60 TABLET | Refills: 0 | Status: SHIPPED | OUTPATIENT
Start: 2021-04-13 | End: 2021-05-27

## 2021-04-13 RX ORDER — ESCITALOPRAM OXALATE 10 MG/1
10 TABLET ORAL DAILY
Qty: 30 TABLET | Refills: 0 | Status: SHIPPED | OUTPATIENT
Start: 2021-04-13 | End: 2021-05-27

## 2021-05-26 ENCOUNTER — OFFICE VISIT (OUTPATIENT)
Dept: FAMILY MEDICINE | Facility: CLINIC | Age: 46
End: 2021-05-26
Payer: COMMERCIAL

## 2021-05-26 DIAGNOSIS — F32.A ANXIETY AND DEPRESSION: ICD-10-CM

## 2021-05-26 DIAGNOSIS — E66.9 OBESITY (BMI 30-39.9): ICD-10-CM

## 2021-05-26 DIAGNOSIS — F41.9 ANXIETY AND DEPRESSION: ICD-10-CM

## 2021-05-26 DIAGNOSIS — F45.41 STRESS HEADACHE: Primary | ICD-10-CM

## 2021-05-26 PROCEDURE — 99213 OFFICE O/P EST LOW 20 MIN: CPT | Mod: 95,,, | Performed by: NURSE PRACTITIONER

## 2021-05-26 PROCEDURE — 99213 PR OFFICE/OUTPT VISIT, EST, LEVL III, 20-29 MIN: ICD-10-PCS | Mod: 95,,, | Performed by: NURSE PRACTITIONER

## 2021-05-26 RX ORDER — BUTALBITAL, ACETAMINOPHEN AND CAFFEINE 50; 325; 40 MG/1; MG/1; MG/1
1 TABLET ORAL EVERY 4 HOURS PRN
Qty: 30 TABLET | Refills: 2 | Status: SHIPPED | OUTPATIENT
Start: 2021-05-26 | End: 2021-06-25

## 2021-05-27 ENCOUNTER — OFFICE VISIT (OUTPATIENT)
Dept: PSYCHIATRY | Facility: CLINIC | Age: 46
End: 2021-05-27
Payer: COMMERCIAL

## 2021-05-27 ENCOUNTER — TELEPHONE (OUTPATIENT)
Dept: FAMILY MEDICINE | Facility: CLINIC | Age: 46
End: 2021-05-27

## 2021-05-27 DIAGNOSIS — F99 INSOMNIA DUE TO OTHER MENTAL DISORDER: ICD-10-CM

## 2021-05-27 DIAGNOSIS — F51.05 INSOMNIA DUE TO OTHER MENTAL DISORDER: ICD-10-CM

## 2021-05-27 DIAGNOSIS — M62.838 MUSCLE SPASM: Primary | ICD-10-CM

## 2021-05-27 DIAGNOSIS — F41.9 ANXIETY: ICD-10-CM

## 2021-05-27 DIAGNOSIS — F43.21 ADJUSTMENT DISORDER WITH DEPRESSED MOOD: ICD-10-CM

## 2021-05-27 PROCEDURE — 99214 OFFICE O/P EST MOD 30 MIN: CPT | Mod: 95,,, | Performed by: REGISTERED NURSE

## 2021-05-27 PROCEDURE — 90833 PR PSYCHOTHERAPY W/PATIENT W/E&M, 30 MIN (ADD ON): ICD-10-PCS | Mod: 95,,, | Performed by: REGISTERED NURSE

## 2021-05-27 PROCEDURE — 90833 PSYTX W PT W E/M 30 MIN: CPT | Mod: 95,,, | Performed by: REGISTERED NURSE

## 2021-05-27 PROCEDURE — 99214 PR OFFICE/OUTPT VISIT, EST, LEVL IV, 30-39 MIN: ICD-10-PCS | Mod: 95,,, | Performed by: REGISTERED NURSE

## 2021-05-27 RX ORDER — ESCITALOPRAM OXALATE 20 MG/1
20 TABLET ORAL DAILY
Qty: 90 TABLET | Refills: 1 | Status: SHIPPED | OUTPATIENT
Start: 2021-05-27 | End: 2022-04-22 | Stop reason: SDUPTHER

## 2021-05-27 RX ORDER — HYDROXYZINE PAMOATE 50 MG/1
50 CAPSULE ORAL EVERY 8 HOURS PRN
Qty: 60 CAPSULE | Refills: 2 | Status: SHIPPED | OUTPATIENT
Start: 2021-05-27 | End: 2021-07-22 | Stop reason: SINTOL

## 2021-05-27 RX ORDER — CLONAZEPAM 0.5 MG/1
0.25 TABLET ORAL DAILY PRN
Qty: 30 TABLET | Refills: 0 | OUTPATIENT
Start: 2021-05-27 | End: 2023-09-04

## 2021-05-28 ENCOUNTER — TELEPHONE (OUTPATIENT)
Dept: FAMILY MEDICINE | Facility: CLINIC | Age: 46
End: 2021-05-28

## 2021-05-28 RX ORDER — TIZANIDINE 4 MG/1
4 TABLET ORAL EVERY 6 HOURS PRN
Qty: 30 TABLET | Refills: 1 | Status: SHIPPED | OUTPATIENT
Start: 2021-05-28 | End: 2021-06-07

## 2021-07-21 ENCOUNTER — TELEPHONE (OUTPATIENT)
Dept: PSYCHIATRY | Facility: CLINIC | Age: 46
End: 2021-07-21

## 2021-07-22 ENCOUNTER — OFFICE VISIT (OUTPATIENT)
Dept: PSYCHIATRY | Facility: CLINIC | Age: 46
End: 2021-07-22
Payer: MEDICAID

## 2021-07-22 ENCOUNTER — TELEPHONE (OUTPATIENT)
Dept: FAMILY MEDICINE | Facility: CLINIC | Age: 46
End: 2021-07-22

## 2021-07-22 VITALS
HEART RATE: 81 BPM | BODY MASS INDEX: 38.12 KG/M2 | DIASTOLIC BLOOD PRESSURE: 86 MMHG | SYSTOLIC BLOOD PRESSURE: 142 MMHG | WEIGHT: 237.19 LBS | HEIGHT: 66 IN

## 2021-07-22 DIAGNOSIS — F43.21 ADJUSTMENT DISORDER WITH DEPRESSED MOOD: Primary | ICD-10-CM

## 2021-07-22 DIAGNOSIS — F99 INSOMNIA DUE TO OTHER MENTAL DISORDER: ICD-10-CM

## 2021-07-22 DIAGNOSIS — F41.9 ANXIETY: ICD-10-CM

## 2021-07-22 DIAGNOSIS — F51.05 INSOMNIA DUE TO OTHER MENTAL DISORDER: ICD-10-CM

## 2021-07-22 PROCEDURE — 99999 PR PBB SHADOW E&M-EST. PATIENT-LVL V: ICD-10-PCS | Mod: PBBFAC,SA,HB, | Performed by: REGISTERED NURSE

## 2021-07-22 PROCEDURE — 99214 PR OFFICE/OUTPT VISIT, EST, LEVL IV, 30-39 MIN: ICD-10-PCS | Mod: SA,HB,S$PBB, | Performed by: REGISTERED NURSE

## 2021-07-22 PROCEDURE — 99214 OFFICE O/P EST MOD 30 MIN: CPT | Mod: SA,HB,S$PBB, | Performed by: REGISTERED NURSE

## 2021-07-22 PROCEDURE — 99999 PR PBB SHADOW E&M-EST. PATIENT-LVL V: CPT | Mod: PBBFAC,SA,HB, | Performed by: REGISTERED NURSE

## 2021-07-22 PROCEDURE — 99215 OFFICE O/P EST HI 40 MIN: CPT | Mod: PBBFAC,PN | Performed by: REGISTERED NURSE

## 2021-07-22 RX ORDER — TIZANIDINE 4 MG/1
4 TABLET ORAL EVERY 6 HOURS PRN
COMMUNITY
Start: 2021-07-10 | End: 2021-08-19 | Stop reason: SDUPTHER

## 2021-07-22 RX ORDER — BUTALBITAL, ACETAMINOPHEN AND CAFFEINE 50; 325; 40 MG/1; MG/1; MG/1
1 TABLET ORAL EVERY 4 HOURS PRN
COMMUNITY
Start: 2021-07-08

## 2021-07-26 ENCOUNTER — TELEPHONE (OUTPATIENT)
Dept: FAMILY MEDICINE | Facility: CLINIC | Age: 46
End: 2021-07-26

## 2021-07-28 ENCOUNTER — LAB VISIT (OUTPATIENT)
Dept: LAB | Facility: HOSPITAL | Age: 46
End: 2021-07-28
Attending: NURSE PRACTITIONER
Payer: MEDICAID

## 2021-07-28 ENCOUNTER — CLINICAL SUPPORT (OUTPATIENT)
Dept: FAMILY MEDICINE | Facility: CLINIC | Age: 46
End: 2021-07-28
Attending: NURSE PRACTITIONER
Payer: MEDICAID

## 2021-07-28 ENCOUNTER — OFFICE VISIT (OUTPATIENT)
Dept: FAMILY MEDICINE | Facility: CLINIC | Age: 46
End: 2021-07-28
Payer: MEDICAID

## 2021-07-28 VITALS
HEART RATE: 88 BPM | DIASTOLIC BLOOD PRESSURE: 70 MMHG | TEMPERATURE: 100 F | HEIGHT: 66 IN | OXYGEN SATURATION: 96 % | WEIGHT: 237.63 LBS | BODY MASS INDEX: 38.19 KG/M2 | SYSTOLIC BLOOD PRESSURE: 120 MMHG

## 2021-07-28 DIAGNOSIS — Z79.4 UNCONTROLLED DIABETES MELLITUS WITH HYPERGLYCEMIA, WITH LONG-TERM CURRENT USE OF INSULIN: ICD-10-CM

## 2021-07-28 DIAGNOSIS — M25.50 POLYARTHRALGIA: ICD-10-CM

## 2021-07-28 DIAGNOSIS — E11.42 DIABETIC POLYNEUROPATHY ASSOCIATED WITH TYPE 2 DIABETES MELLITUS: ICD-10-CM

## 2021-07-28 DIAGNOSIS — M25.512 ACUTE PAIN OF LEFT SHOULDER: Primary | ICD-10-CM

## 2021-07-28 DIAGNOSIS — E03.9 ACQUIRED HYPOTHYROIDISM: ICD-10-CM

## 2021-07-28 DIAGNOSIS — E11.65 UNCONTROLLED DIABETES MELLITUS WITH HYPERGLYCEMIA, WITH LONG-TERM CURRENT USE OF INSULIN: ICD-10-CM

## 2021-07-28 DIAGNOSIS — R35.0 URINARY FREQUENCY: ICD-10-CM

## 2021-07-28 DIAGNOSIS — E66.9 OBESITY (BMI 30-39.9): ICD-10-CM

## 2021-07-28 DIAGNOSIS — E11.42 TYPE 2 DIABETES MELLITUS WITH DIABETIC POLYNEUROPATHY, WITHOUT LONG-TERM CURRENT USE OF INSULIN: ICD-10-CM

## 2021-07-28 LAB
BILIRUB SERPL-MCNC: ABNORMAL MG/DL
BLOOD URINE, POC: ABNORMAL
CLARITY, POC UA: CLEAR
COLOR, POC UA: YELLOW
ERYTHROCYTE [SEDIMENTATION RATE] IN BLOOD BY WESTERGREN METHOD: 74 MM/HR (ref 0–36)
GLUCOSE UR QL STRIP: 1000
KETONES UR QL STRIP: ABNORMAL
LEUKOCYTE ESTERASE URINE, POC: ABNORMAL
NITRITE, POC UA: ABNORMAL
PH, POC UA: 6
PROTEIN, POC: ABNORMAL
SPECIFIC GRAVITY, POC UA: 1.02
UROBILINOGEN, POC UA: 12

## 2021-07-28 PROCEDURE — 99214 PR OFFICE/OUTPT VISIT, EST, LEVL IV, 30-39 MIN: ICD-10-PCS | Mod: S$PBB,,, | Performed by: NURSE PRACTITIONER

## 2021-07-28 PROCEDURE — 99213 OFFICE O/P EST LOW 20 MIN: CPT | Mod: PBBFAC,25,PO | Performed by: NURSE PRACTITIONER

## 2021-07-28 PROCEDURE — 82306 VITAMIN D 25 HYDROXY: CPT | Performed by: NURSE PRACTITIONER

## 2021-07-28 PROCEDURE — 99214 OFFICE O/P EST MOD 30 MIN: CPT | Mod: S$PBB,,, | Performed by: NURSE PRACTITIONER

## 2021-07-28 PROCEDURE — 92228 IMG RTA DETC/MNTR DS PHY/QHP: CPT | Mod: 26,S$PBB,, | Performed by: OPTOMETRIST

## 2021-07-28 PROCEDURE — 85652 RBC SED RATE AUTOMATED: CPT | Performed by: NURSE PRACTITIONER

## 2021-07-28 PROCEDURE — 83036 HEMOGLOBIN GLYCOSYLATED A1C: CPT | Performed by: NURSE PRACTITIONER

## 2021-07-28 PROCEDURE — 99999 PR PBB SHADOW E&M-EST. PATIENT-LVL III: ICD-10-PCS | Mod: PBBFAC,,, | Performed by: NURSE PRACTITIONER

## 2021-07-28 PROCEDURE — 84439 ASSAY OF FREE THYROXINE: CPT | Performed by: NURSE PRACTITIONER

## 2021-07-28 PROCEDURE — 92228 DIABETIC EYE SCREENING PHOTO: ICD-10-PCS | Mod: 26,S$PBB,, | Performed by: OPTOMETRIST

## 2021-07-28 PROCEDURE — 82570 ASSAY OF URINE CREATININE: CPT | Performed by: NURSE PRACTITIONER

## 2021-07-28 PROCEDURE — 36415 COLL VENOUS BLD VENIPUNCTURE: CPT | Mod: PO | Performed by: NURSE PRACTITIONER

## 2021-07-28 PROCEDURE — 84443 ASSAY THYROID STIM HORMONE: CPT | Performed by: NURSE PRACTITIONER

## 2021-07-28 PROCEDURE — 81002 URINALYSIS NONAUTO W/O SCOPE: CPT | Mod: PBBFAC,PO | Performed by: NURSE PRACTITIONER

## 2021-07-28 PROCEDURE — 86038 ANTINUCLEAR ANTIBODIES: CPT | Performed by: NURSE PRACTITIONER

## 2021-07-28 PROCEDURE — 92228 IMG RTA DETC/MNTR DS PHY/QHP: CPT | Mod: PBBFAC,PO,59

## 2021-07-28 PROCEDURE — 86431 RHEUMATOID FACTOR QUANT: CPT | Performed by: NURSE PRACTITIONER

## 2021-07-28 PROCEDURE — 96372 THER/PROPH/DIAG INJ SC/IM: CPT | Mod: PBBFAC,PO

## 2021-07-28 PROCEDURE — 99999 PR PBB SHADOW E&M-EST. PATIENT-LVL III: CPT | Mod: PBBFAC,,, | Performed by: NURSE PRACTITIONER

## 2021-07-28 PROCEDURE — 86140 C-REACTIVE PROTEIN: CPT | Performed by: NURSE PRACTITIONER

## 2021-07-28 PROCEDURE — 82043 UR ALBUMIN QUANTITATIVE: CPT | Performed by: NURSE PRACTITIONER

## 2021-07-28 RX ORDER — NAPROXEN 500 MG/1
500 TABLET ORAL 2 TIMES DAILY
Qty: 28 TABLET | Refills: 0 | Status: SHIPPED | OUTPATIENT
Start: 2021-07-28 | End: 2021-08-11

## 2021-07-28 RX ORDER — NITROFURANTOIN 25; 75 MG/1; MG/1
100 CAPSULE ORAL 2 TIMES DAILY
Qty: 14 CAPSULE | Refills: 0 | Status: SHIPPED | OUTPATIENT
Start: 2021-07-28 | End: 2021-08-04

## 2021-07-28 RX ORDER — KETOROLAC TROMETHAMINE 30 MG/ML
30 INJECTION, SOLUTION INTRAMUSCULAR; INTRAVENOUS
Status: COMPLETED | OUTPATIENT
Start: 2021-07-28 | End: 2021-07-28

## 2021-07-28 RX ADMIN — KETOROLAC TROMETHAMINE 30 MG: 30 INJECTION, SOLUTION INTRAMUSCULAR; INTRAVENOUS at 04:07

## 2021-07-29 LAB
25(OH)D3+25(OH)D2 SERPL-MCNC: 16 NG/ML (ref 30–96)
ALBUMIN/CREAT UR: 80.3 UG/MG (ref 0–30)
ANA SER QL IF: NORMAL
CREAT UR-MCNC: 223 MG/DL (ref 15–325)
CRP SERPL-MCNC: 22.1 MG/L (ref 0–8.2)
ESTIMATED AVG GLUCOSE: 301 MG/DL (ref 68–131)
HBA1C MFR BLD: 12.1 % (ref 4–5.6)
MICROALBUMIN UR DL<=1MG/L-MCNC: 179 UG/ML
RHEUMATOID FACT SERPL-ACNC: 12 IU/ML (ref 0–15)
T4 FREE SERPL-MCNC: 0.91 NG/DL (ref 0.71–1.51)
TSH SERPL DL<=0.005 MIU/L-ACNC: 12.13 UIU/ML (ref 0.4–4)

## 2021-08-04 ENCOUNTER — TELEPHONE (OUTPATIENT)
Dept: FAMILY MEDICINE | Facility: CLINIC | Age: 46
End: 2021-08-04

## 2021-08-04 DIAGNOSIS — Z79.4 UNCONTROLLED DIABETES MELLITUS WITH HYPERGLYCEMIA, WITH LONG-TERM CURRENT USE OF INSULIN: ICD-10-CM

## 2021-08-04 DIAGNOSIS — E55.9 VITAMIN D INSUFFICIENCY: Primary | ICD-10-CM

## 2021-08-04 DIAGNOSIS — E11.65 UNCONTROLLED DIABETES MELLITUS WITH HYPERGLYCEMIA, WITH LONG-TERM CURRENT USE OF INSULIN: ICD-10-CM

## 2021-08-04 RX ORDER — ERGOCALCIFEROL 1.25 MG/1
50000 CAPSULE ORAL
Qty: 12 CAPSULE | Refills: 0 | Status: SHIPPED | OUTPATIENT
Start: 2021-08-04 | End: 2021-11-02

## 2021-08-13 ENCOUNTER — PATIENT MESSAGE (OUTPATIENT)
Dept: PSYCHIATRY | Facility: CLINIC | Age: 46
End: 2021-08-13

## 2021-08-16 ENCOUNTER — TELEPHONE (OUTPATIENT)
Dept: PSYCHIATRY | Facility: CLINIC | Age: 46
End: 2021-08-16

## 2021-08-20 RX ORDER — TIZANIDINE 4 MG/1
4 TABLET ORAL EVERY 6 HOURS PRN
Qty: 20 TABLET | Refills: 0 | Status: SHIPPED | OUTPATIENT
Start: 2021-08-20 | End: 2021-09-29 | Stop reason: SDUPTHER

## 2021-09-21 ENCOUNTER — TELEPHONE (OUTPATIENT)
Dept: PSYCHIATRY | Facility: CLINIC | Age: 46
End: 2021-09-21

## 2021-10-01 RX ORDER — TIZANIDINE 4 MG/1
4 TABLET ORAL EVERY 6 HOURS PRN
Qty: 20 TABLET | Refills: 0 | OUTPATIENT
Start: 2021-10-01 | End: 2023-09-04

## 2021-10-21 ENCOUNTER — OFFICE VISIT (OUTPATIENT)
Dept: OBSTETRICS AND GYNECOLOGY | Facility: CLINIC | Age: 46
End: 2021-10-21
Payer: MEDICAID

## 2021-10-21 ENCOUNTER — HOSPITAL ENCOUNTER (OUTPATIENT)
Dept: RADIOLOGY | Facility: HOSPITAL | Age: 46
Discharge: HOME | End: 2021-10-21
Attending: STUDENT IN AN ORGANIZED HEALTH CARE EDUCATION/TRAINING PROGRAM
Payer: MEDICAID

## 2021-10-21 ENCOUNTER — TELEPHONE (OUTPATIENT)
Dept: OBSTETRICS AND GYNECOLOGY | Facility: CLINIC | Age: 46
End: 2021-10-21

## 2021-10-21 VITALS
SYSTOLIC BLOOD PRESSURE: 132 MMHG | DIASTOLIC BLOOD PRESSURE: 78 MMHG | WEIGHT: 227.5 LBS | HEIGHT: 66 IN | BODY MASS INDEX: 36.56 KG/M2 | RESPIRATION RATE: 14 BRPM

## 2021-10-21 DIAGNOSIS — Z12.31 ENCOUNTER FOR SCREENING MAMMOGRAM FOR MALIGNANT NEOPLASM OF BREAST: ICD-10-CM

## 2021-10-21 DIAGNOSIS — Z01.419 WELL WOMAN EXAM: Primary | ICD-10-CM

## 2021-10-21 DIAGNOSIS — L90.0 LICHEN SCLEROSUS: ICD-10-CM

## 2021-10-21 DIAGNOSIS — Z12.31 ENCOUNTER FOR SCREENING MAMMOGRAM FOR MALIGNANT NEOPLASM OF BREAST: Primary | ICD-10-CM

## 2021-10-21 DIAGNOSIS — R10.2 VAGINAL PAIN: ICD-10-CM

## 2021-10-21 PROCEDURE — 99999 PR PBB SHADOW E&M-EST. PATIENT-LVL IV: ICD-10-PCS | Mod: PBBFAC,,, | Performed by: STUDENT IN AN ORGANIZED HEALTH CARE EDUCATION/TRAINING PROGRAM

## 2021-10-21 PROCEDURE — 77067 SCR MAMMO BI INCL CAD: CPT | Mod: TC,PN

## 2021-10-21 PROCEDURE — 99396 PREV VISIT EST AGE 40-64: CPT | Mod: S$PBB,,, | Performed by: STUDENT IN AN ORGANIZED HEALTH CARE EDUCATION/TRAINING PROGRAM

## 2021-10-21 PROCEDURE — 77063 BREAST TOMOSYNTHESIS BI: CPT | Mod: 26,,, | Performed by: RADIOLOGY

## 2021-10-21 PROCEDURE — 87491 CHLMYD TRACH DNA AMP PROBE: CPT | Performed by: STUDENT IN AN ORGANIZED HEALTH CARE EDUCATION/TRAINING PROGRAM

## 2021-10-21 PROCEDURE — 87591 N.GONORRHOEAE DNA AMP PROB: CPT | Mod: 59 | Performed by: STUDENT IN AN ORGANIZED HEALTH CARE EDUCATION/TRAINING PROGRAM

## 2021-10-21 PROCEDURE — 77067 SCR MAMMO BI INCL CAD: CPT | Mod: 26,,, | Performed by: RADIOLOGY

## 2021-10-21 PROCEDURE — 99214 OFFICE O/P EST MOD 30 MIN: CPT | Mod: PBBFAC,PN | Performed by: STUDENT IN AN ORGANIZED HEALTH CARE EDUCATION/TRAINING PROGRAM

## 2021-10-21 PROCEDURE — 87481 CANDIDA DNA AMP PROBE: CPT | Mod: 59 | Performed by: STUDENT IN AN ORGANIZED HEALTH CARE EDUCATION/TRAINING PROGRAM

## 2021-10-21 PROCEDURE — 77067 MAMMO DIGITAL SCREENING BILAT WITH TOMO: ICD-10-PCS | Mod: 26,,, | Performed by: RADIOLOGY

## 2021-10-21 PROCEDURE — 99999 PR PBB SHADOW E&M-EST. PATIENT-LVL IV: CPT | Mod: PBBFAC,,, | Performed by: STUDENT IN AN ORGANIZED HEALTH CARE EDUCATION/TRAINING PROGRAM

## 2021-10-21 PROCEDURE — 99396 PR PREVENTIVE VISIT,EST,40-64: ICD-10-PCS | Mod: S$PBB,,, | Performed by: STUDENT IN AN ORGANIZED HEALTH CARE EDUCATION/TRAINING PROGRAM

## 2021-10-21 PROCEDURE — 77063 MAMMO DIGITAL SCREENING BILAT WITH TOMO: ICD-10-PCS | Mod: 26,,, | Performed by: RADIOLOGY

## 2021-10-21 RX ORDER — DOXYCYCLINE 100 MG/1
100 CAPSULE ORAL EVERY 12 HOURS
COMMUNITY
Start: 2021-09-08

## 2021-10-21 RX ORDER — CLOBETASOL PROPIONATE 0.5 MG/G
CREAM TOPICAL 2 TIMES DAILY
Qty: 15 G | Refills: 0 | Status: SHIPPED | OUTPATIENT
Start: 2021-10-21 | End: 2022-04-22 | Stop reason: SDUPTHER

## 2021-10-21 RX ORDER — NAPROXEN 500 MG/1
TABLET ORAL
COMMUNITY
Start: 2021-09-08 | End: 2022-02-03

## 2021-10-22 LAB
C TRACH DNA SPEC QL NAA+PROBE: NOT DETECTED
N GONORRHOEA DNA SPEC QL NAA+PROBE: NOT DETECTED

## 2021-10-24 DIAGNOSIS — I10 ESSENTIAL HYPERTENSION: ICD-10-CM

## 2021-10-24 RX ORDER — AMLODIPINE BESYLATE 5 MG/1
TABLET ORAL
Qty: 90 TABLET | Refills: 0 | OUTPATIENT
Start: 2021-10-24

## 2021-10-24 RX ORDER — LISINOPRIL 40 MG/1
TABLET ORAL
Qty: 90 TABLET | Refills: 0 | OUTPATIENT
Start: 2021-10-24

## 2021-10-26 LAB
BACTERIAL VAGINOSIS DNA: POSITIVE
CANDIDA GLABRATA DNA: NEGATIVE
CANDIDA KRUSEI DNA: NEGATIVE
CANDIDA RRNA VAG QL PROBE: POSITIVE
T VAGINALIS RRNA GENITAL QL PROBE: NEGATIVE

## 2021-10-26 RX ORDER — FLUCONAZOLE 150 MG/1
150 TABLET ORAL ONCE
Qty: 1 TABLET | Refills: 1 | Status: SHIPPED | OUTPATIENT
Start: 2021-10-26 | End: 2021-10-26

## 2021-10-26 RX ORDER — METRONIDAZOLE 500 MG/1
500 TABLET ORAL EVERY 12 HOURS
Qty: 14 TABLET | Refills: 0 | Status: SHIPPED | OUTPATIENT
Start: 2021-10-26 | End: 2021-11-02

## 2021-12-28 ENCOUNTER — TELEPHONE (OUTPATIENT)
Dept: FAMILY MEDICINE | Facility: CLINIC | Age: 46
End: 2021-12-28
Payer: MEDICAID

## 2022-01-12 NOTE — TELEPHONE ENCOUNTER
----- Message from Ericka Sanon sent at 3/12/2020 10:01 AM CDT -----  Contact: self [phone]  She said the medication you prescribed is not working for her, she cannot even sleep.  She is requesting something else.  Please call her to discuss this.  Thank you!    [facility] [address] [place] [id number] [address]  [id number]  [facility] [address]  Phone: [phone] Fax: [phone]       normal

## 2022-01-23 DIAGNOSIS — E78.5 DYSLIPIDEMIA: ICD-10-CM

## 2022-01-23 DIAGNOSIS — E11.42 TYPE 2 DIABETES MELLITUS WITH DIABETIC POLYNEUROPATHY, WITHOUT LONG-TERM CURRENT USE OF INSULIN: ICD-10-CM

## 2022-01-24 RX ORDER — METFORMIN HYDROCHLORIDE 500 MG/1
TABLET, EXTENDED RELEASE ORAL
Qty: 60 TABLET | Refills: 0 | Status: SHIPPED | OUTPATIENT
Start: 2022-01-24 | End: 2022-04-22 | Stop reason: SDUPTHER

## 2022-01-24 RX ORDER — INSULIN GLARGINE 100 [IU]/ML
15 INJECTION, SOLUTION SUBCUTANEOUS NIGHTLY
Qty: 15 ML | Refills: 0 | Status: SHIPPED | OUTPATIENT
Start: 2022-01-24 | End: 2022-04-22 | Stop reason: SDUPTHER

## 2022-01-24 RX ORDER — PEN NEEDLE, DIABETIC 32GX 5/32"
NEEDLE, DISPOSABLE MISCELLANEOUS
Qty: 100 EACH | Refills: 0 | Status: SHIPPED | OUTPATIENT
Start: 2022-01-24 | End: 2022-04-22 | Stop reason: SDUPTHER

## 2022-01-24 RX ORDER — ATORVASTATIN CALCIUM 10 MG/1
TABLET, FILM COATED ORAL
Qty: 90 TABLET | Refills: 0 | Status: SHIPPED | OUTPATIENT
Start: 2022-01-24 | End: 2022-04-22 | Stop reason: SDUPTHER

## 2022-01-24 NOTE — TELEPHONE ENCOUNTER
Informed pt. of message from Dr. Courtney. Pt verbalized an understanding & scheduled an appt. on 3/8/22 with Dr. Courtney.

## 2022-01-28 ENCOUNTER — TELEPHONE (OUTPATIENT)
Dept: FAMILY MEDICINE | Facility: CLINIC | Age: 47
End: 2022-01-28
Payer: MEDICAID

## 2022-01-28 DIAGNOSIS — M25.512 ACUTE PAIN OF LEFT SHOULDER: Primary | ICD-10-CM

## 2022-01-28 RX ORDER — TRAMADOL HYDROCHLORIDE 50 MG/1
50 TABLET ORAL EVERY 12 HOURS PRN
Qty: 10 TABLET | Refills: 0 | Status: SHIPPED | OUTPATIENT
Start: 2022-01-28 | End: 2022-02-03

## 2022-01-28 NOTE — TELEPHONE ENCOUNTER
----- Message from Vane Gordon MA sent at 1/28/2022  4:16 PM CST -----  Contact: pt at  [phone]'  Please see chart note.   ----- Message -----  From: Elsa Odonnell  Sent: 1/28/2022   1:24 PM CST  To: Regan Aguillon Staff    Type: Needs Medical Advice  Who Called:  pt  Best Call Back Number: [phone]'  Additional Information: pt is calling the office to let them know she has been taking aleve every 6 hours for pain in her arm and also using hot patches. It is not alleviating the pain for her. Please call back and advise   Is there something that the doctor can prescribed until she sees her Monday.

## 2022-01-28 NOTE — TELEPHONE ENCOUNTER
----- Message from Juanita Syed sent at 1/28/2022  7:24 AM CST -----  Type:  Same Day Appointment Request    Caller is requesting a same day appointment.      Name of Caller:  Haydee Allen  When is the first available appointment?  NA (medicaid)  Symptoms:  persistent left arm pain  Best Call Back Number:  [phone]  Additional Information:

## 2022-02-03 ENCOUNTER — HOSPITAL ENCOUNTER (OUTPATIENT)
Dept: RADIOLOGY | Facility: CLINIC | Age: 47
Discharge: HOME | End: 2022-02-03
Attending: NURSE PRACTITIONER
Payer: MEDICAID

## 2022-02-03 ENCOUNTER — OFFICE VISIT (OUTPATIENT)
Dept: FAMILY MEDICINE | Facility: CLINIC | Age: 47
End: 2022-02-03
Payer: MEDICAID

## 2022-02-03 VITALS
BODY MASS INDEX: 38.37 KG/M2 | HEART RATE: 96 BPM | TEMPERATURE: 98 F | DIASTOLIC BLOOD PRESSURE: 98 MMHG | SYSTOLIC BLOOD PRESSURE: 130 MMHG | OXYGEN SATURATION: 99 % | HEIGHT: 66 IN | WEIGHT: 238.75 LBS

## 2022-02-03 DIAGNOSIS — E66.9 OBESITY (BMI 30-39.9): ICD-10-CM

## 2022-02-03 DIAGNOSIS — B37.31 VAGINAL YEAST INFECTION: ICD-10-CM

## 2022-02-03 DIAGNOSIS — M25.522 LEFT ELBOW PAIN: ICD-10-CM

## 2022-02-03 DIAGNOSIS — R22.1 NECK MASS: Primary | ICD-10-CM

## 2022-02-03 PROCEDURE — 3075F SYST BP GE 130 - 139MM HG: CPT | Mod: CPTII,,, | Performed by: NURSE PRACTITIONER

## 2022-02-03 PROCEDURE — 99213 PR OFFICE/OUTPT VISIT, EST, LEVL III, 20-29 MIN: ICD-10-PCS | Mod: S$PBB,,, | Performed by: NURSE PRACTITIONER

## 2022-02-03 PROCEDURE — 1159F PR MEDICATION LIST DOCUMENTED IN MEDICAL RECORD: ICD-10-PCS | Mod: CPTII,,, | Performed by: NURSE PRACTITIONER

## 2022-02-03 PROCEDURE — 73080 X-RAY EXAM OF ELBOW: CPT | Mod: 26,LT,S$GLB, | Performed by: RADIOLOGY

## 2022-02-03 PROCEDURE — 1159F MED LIST DOCD IN RCRD: CPT | Mod: CPTII,,, | Performed by: NURSE PRACTITIONER

## 2022-02-03 PROCEDURE — 3075F PR MOST RECENT SYSTOLIC BLOOD PRESS GE 130-139MM HG: ICD-10-PCS | Mod: CPTII,,, | Performed by: NURSE PRACTITIONER

## 2022-02-03 PROCEDURE — 96372 THER/PROPH/DIAG INJ SC/IM: CPT | Mod: PBBFAC,PO

## 2022-02-03 PROCEDURE — 3080F DIAST BP >= 90 MM HG: CPT | Mod: CPTII,,, | Performed by: NURSE PRACTITIONER

## 2022-02-03 PROCEDURE — 99999 PR PBB SHADOW E&M-EST. PATIENT-LVL V: CPT | Mod: PBBFAC,,, | Performed by: NURSE PRACTITIONER

## 2022-02-03 PROCEDURE — 3008F PR BODY MASS INDEX (BMI) DOCUMENTED: ICD-10-PCS | Mod: CPTII,,, | Performed by: NURSE PRACTITIONER

## 2022-02-03 PROCEDURE — 73080 X-RAY EXAM OF ELBOW: CPT | Mod: TC,FY,PO,LT

## 2022-02-03 PROCEDURE — 3080F PR MOST RECENT DIASTOLIC BLOOD PRESSURE >= 90 MM HG: ICD-10-PCS | Mod: CPTII,,, | Performed by: NURSE PRACTITIONER

## 2022-02-03 PROCEDURE — 99213 OFFICE O/P EST LOW 20 MIN: CPT | Mod: S$PBB,,, | Performed by: NURSE PRACTITIONER

## 2022-02-03 PROCEDURE — 3008F BODY MASS INDEX DOCD: CPT | Mod: CPTII,,, | Performed by: NURSE PRACTITIONER

## 2022-02-03 PROCEDURE — 1160F PR REVIEW ALL MEDS BY PRESCRIBER/CLIN PHARMACIST DOCUMENTED: ICD-10-PCS | Mod: CPTII,,, | Performed by: NURSE PRACTITIONER

## 2022-02-03 PROCEDURE — 99215 OFFICE O/P EST HI 40 MIN: CPT | Mod: PBBFAC,25,PO | Performed by: NURSE PRACTITIONER

## 2022-02-03 PROCEDURE — 99999 PR PBB SHADOW E&M-EST. PATIENT-LVL V: ICD-10-PCS | Mod: PBBFAC,,, | Performed by: NURSE PRACTITIONER

## 2022-02-03 PROCEDURE — 73080 XR ELBOW COMPLETE 3 VIEW LEFT: ICD-10-PCS | Mod: 26,LT,S$GLB, | Performed by: RADIOLOGY

## 2022-02-03 PROCEDURE — 1160F RVW MEDS BY RX/DR IN RCRD: CPT | Mod: CPTII,,, | Performed by: NURSE PRACTITIONER

## 2022-02-03 RX ORDER — FLASH GLUCOSE SCANNING READER
EACH MISCELLANEOUS
Status: CANCELLED | OUTPATIENT
Start: 2022-02-03

## 2022-02-03 RX ORDER — FLASH GLUCOSE SENSOR
KIT MISCELLANEOUS
Status: CANCELLED | OUTPATIENT
Start: 2022-02-03

## 2022-02-03 RX ORDER — FLUCONAZOLE 150 MG/1
150 TABLET ORAL DAILY
Qty: 1 TABLET | Refills: 0 | Status: SHIPPED | OUTPATIENT
Start: 2022-02-03 | End: 2022-02-04

## 2022-02-03 RX ORDER — KETOROLAC TROMETHAMINE 30 MG/ML
30 INJECTION, SOLUTION INTRAMUSCULAR; INTRAVENOUS
Status: COMPLETED | OUTPATIENT
Start: 2022-02-03 | End: 2022-02-03

## 2022-02-03 RX ORDER — TRAMADOL HYDROCHLORIDE 50 MG/1
50 TABLET ORAL EVERY 12 HOURS PRN
Qty: 14 TABLET | Refills: 0 | Status: SHIPPED | OUTPATIENT
Start: 2022-02-03 | End: 2022-02-10

## 2022-02-03 RX ADMIN — KETOROLAC TROMETHAMINE 30 MG: 30 INJECTION, SOLUTION INTRAMUSCULAR; INTRAVENOUS at 03:02

## 2022-02-03 NOTE — PROGRESS NOTES
"Subjective:       Patient ID: Haydee Allen is a 46 y.o. female.    Chief Complaint: Arm Pain    Elbow Injury  This is a new problem. The current episode started 1 to 4 weeks ago. The problem occurs intermittently. The problem has been waxing and waning. Pertinent negatives include no chest pain, headaches, rash or sore throat. The symptoms are aggravated by bending. She has tried rest and NSAIDs for the symptoms.     Patient A1c 12 patient reports she is not taking her insulin due to pain with subq insulin injects. She report she rotates from her abdomin to the back of her upper arms but still has pain. She is schedule to be see by [facility] on 3/8/22.  Past Medical History:   Diagnosis Date    Abdominal pain     RIGHT MID ABD    Anemia     Anemia due to chronic blood loss 6/25/2020    Anxiety     Arthritis     Complex cyst of right ovary 6/17/2019    Diabetes mellitus     Diabetes mellitus, type 2     Encounter for blood transfusion     2    Encounter for well woman exam with routine gynecological exam 5/13/2019    Hypertension     Menorrhagia with regular cycle 6/17/2019    Menorrhagia with regular cycle 6/25/2020    Microcytic anemia 6/25/2020    Obesity     Ovarian cyst     right    Problem involving surgical incision 7/18/2019    Sinus congestion     Thyroid disease        Review of patient's allergies indicates:   Allergen Reactions    Trulicity [dulaglutide] Rash         Current Outpatient Medications:     amLODIPine (NORVASC) 10 MG tablet, Take 1 tablet (10 mg total) by mouth once daily., Disp: 90 tablet, Rfl: 1    atorvastatin (LIPITOR) 10 MG tablet, Take 1 tablet by mouth once daily, Disp: 90 tablet, Rfl: 0    BD CINDY 2ND GEN PEN NEEDLE 32 gauge x 5/32" Ndle, USE 1  TWICE DAILY, Disp: 100 each, Rfl: 0    blood sugar diagnostic (TRUE METRIX GLUCOSE TEST STRIP MISC), True Metrix Glucose Test Strip  Use twice daily as directed, Disp: , Rfl:     " butalbital-acetaminophen-caffeine -40 mg (FIORICET, ESGIC) -40 mg per tablet, Take 1 tablet by mouth every 4 (four) hours as needed., Disp: , Rfl:     celecoxib (CELEBREX) 200 MG capsule, Take 1 capsule (200 mg total) by mouth 2 (two) times daily as needed for Pain. Take with food., Disp: 60 capsule, Rfl: 3    clobetasoL (TEMOVATE) 0.05 % cream, Apply topically 2 (two) times daily., Disp: 15 g, Rfl: 0    docusate sodium (COLACE) 100 MG capsule, Stool Softener 100 mg capsule, Disp: , Rfl:     doxycycline (VIBRAMYCIN) 100 MG Cap, Take 100 mg by mouth every 12 (twelve) hours., Disp: , Rfl:     insulin (LANTUS SOLOSTAR U-100 INSULIN) glargine 100 units/mL (3mL) SubQ pen, Inject 15 Units into the skin every evening. Increase dose as directed until AM glucose at goal . Max daily dose 50 units. Last appointment over 1 year ago, follow-up needed to be able to approve additional refills., Disp: 15 mL, Rfl: 0    lisinopriL (PRINIVIL,ZESTRIL) 40 MG tablet, Take 1 tablet (40 mg total) by mouth once daily., Disp: 90 tablet, Rfl: 1    metFORMIN (GLUCOPHAGE-XR) 500 MG ER 24hr tablet, TAKE 2 TABLETS BY MOUTH ONCE DAILY WITH BREAKFAST, Disp: 60 tablet, Rfl: 0    tiZANidine (ZANAFLEX) 4 MG tablet, Take 1 tablet (4 mg total) by mouth every 6 (six) hours as needed (muscle spasm)., Disp: 20 tablet, Rfl: 0    traZODone (DESYREL) 50 MG tablet, Take 1 tablet (50 mg total) by mouth every evening., Disp: 30 tablet, Rfl: 11    clonazePAM (KLONOPIN) 0.5 MG tablet, Take 0.5 tablets (0.25 mg total) by mouth daily as needed for Anxiety., Disp: 30 tablet, Rfl: 0    EScitalopram oxalate (LEXAPRO) 20 MG tablet, Take 1 tablet (20 mg total) by mouth once daily., Disp: 90 tablet, Rfl: 1    ferrous sulfate 324 mg (65 mg iron) TbEC, Take 1 tablet (324 mg total) by mouth 2 (two) times daily., Disp: 180 tablet, Rfl: 0    flash glucose scanning reader (FREESTYLE TANGELA 14 DAY READER) Misc, Use with freestyle tangela to monitor  glucose, Disp: 1 each, Rfl: 0    flash glucose sensor (FREESTYLE TANGELA 2 SENSOR) Kit, 1 Device by Misc.(Non-Drug; Combo Route) route every 14 (fourteen) days., Disp: 6 kit, Rfl: 0    gabapentin (NEURONTIN) 300 MG capsule, Take 1 capsule (300 mg total) by mouth 3 (three) times daily., Disp: 90 capsule, Rfl: 11    glipiZIDE (GLUCOTROL) 5 MG TR24, Take 1 tablet (5 mg total) by mouth daily with breakfast., Disp: 90 tablet, Rfl: 3    hydroCHLOROthiazide (HYDRODIURIL) 12.5 MG Tab, Take 1 tablet (12.5 mg total) by mouth once daily., Disp: 90 tablet, Rfl: 1    levothyroxine (SYNTHROID) 200 MCG tablet, Take 1 tablet (200 mcg total) by mouth before breakfast. Take one extra tablet (400 mcg total) on Sundays., Disp: 102 tablet, Rfl: 3    liraglutide 0.6 mg/0.1 mL, 18 mg/3 mL, subq PNIJ (VICTOZA 2-YU) 0.6 mg/0.1 mL (18 mg/3 mL) PnIj pen, Inject 1.8 mg into the skin once daily., Disp: 9 mL, Rfl: 11    nystatin (MYCOSTATIN) ointment, Apply topically 2 (two) times daily. for 14 days, Disp: 30 g, Rfl: 2  No current facility-administered medications for this visit.    Facility-Administered Medications Ordered in Other Visits:     lidocaine (PF) 10 mg/ml (1%) injection 2 mg, 0.2 mL, Intradermal, Once, Brenda Hauser MD    Review of Systems   Constitutional: Negative for unexpected weight change.   HENT: Negative for sore throat and trouble swallowing.    Eyes: Negative for visual disturbance.   Respiratory: Negative for shortness of breath.    Cardiovascular: Negative for chest pain, palpitations and leg swelling.   Gastrointestinal: Negative for blood in stool.   Genitourinary: Negative for hematuria.   Skin: Negative for rash.   Allergic/Immunologic: Negative for immunocompromised state.   Neurological: Negative for headaches.   Hematological: Does not bruise/bleed easily.   Psychiatric/Behavioral: Negative for agitation. The patient is not nervous/anxious.        Objective:      BP (!) 130/98   Pulse 96   Temp 98 °F  "(36.7 °C)   Ht 5' 6" (1.676 m)   Wt 108.3 kg (238 lb 12.1 oz)   LMP 06/05/2019   SpO2 99%   BMI 38.54 kg/m²   Physical Exam  Constitutional:       Appearance: She is well-developed and well-nourished.   Eyes:      Extraocular Movements: EOM normal.      Pupils: Pupils are equal, round, and reactive to light.   Neck:     Cardiovascular:      Rate and Rhythm: Normal rate and regular rhythm.      Heart sounds: Normal heart sounds.   Pulmonary:      Effort: Pulmonary effort is normal.      Breath sounds: Normal breath sounds.   Abdominal:      General: Bowel sounds are normal.      Palpations: Abdomen is soft.   Musculoskeletal:         General: Normal range of motion.      Cervical back: Normal range of motion.   Lymphadenopathy:      Cervical: Cervical adenopathy present.      Right cervical: Posterior cervical adenopathy present.      Left cervical: Superficial cervical adenopathy present.   Skin:     General: Skin is warm and dry.   Neurological:      Mental Status: She is alert and oriented to person, place, and time.   Psychiatric:         Mood and Affect: Mood and affect normal.         Behavior: Behavior normal.         Thought Content: Thought content normal.         Judgment: Judgment normal.         Assessment:       1. Neck mass    2. Left elbow pain    3. Vaginal yeast infection    4. Obesity (BMI 30-39.9)        Plan:       Neck mass  -     US Soft Tissue Head Neck Thyroid; Future; Expected date: 02/03/2022    Left elbow pain  -     X-Ray Elbow Complete Left; Future; Expected date: 02/03/2022  -     Ambulatory referral/consult to Orthopedics; Future; Expected date: 02/10/2022  -     ketorolac injection 30 mg  -     traMADoL (ULTRAM) 50 mg tablet; Take 1 tablet (50 mg total) by mouth every 12 (twelve) hours as needed for Pain.  Dispense: 14 tablet; Refill: 0    Vaginal yeast infection  -     fluconazole (DIFLUCAN) 150 MG Tab; Take 1 tablet (150 mg total) by mouth once daily. for 1 day  Dispense: 1 " "tablet; Refill: 0    Obesity (BMI 30-39.9)  Counseled patient on his ideal body weight, health consequences of being obese and current recommendations including weekly exercise and a heart healthy diet.  Current BMI is:Estimated body mass index is 38.54 kg/m² as calculated from the following:    Height as of this encounter: 5' 6" (1.676 m).    Weight as of this encounter: 108.3 kg (238 lb 12.1 oz)..  Patient is aware that ideal BMI < 25 or Weight in (lb) to have BMI = 25: 154.6.            Patient readiness: acceptance and barriers:none    During the course of the visit the patient was educated and counseled about the following:     Obesity:   General weight loss/lifestyle modification strategies discussed (elicit support from others; identify saboteurs; non-food rewards, etc).  Regular aerobic exercise program discussed.    Goals: Obesity: Reduce calorie intake and BMI    Did patient meet goals/outcomes: No    The following self management tools provided: declined    Patient Instructions (the written plan) was given to the patient/family.     Time spent with patient: 15 minutes    Barriers to medications present (no )    Adverse reactions to current medications (no)    Over the counter medications reviewed (Yes)          "

## 2022-02-03 NOTE — TELEPHONE ENCOUNTER
[facility] OUTPATIENT ENCOUNTER  HEMATOLOGY / ONCOLOGY CONSULT NOTE      CONSULTATION DATE:  (Not on file)  CONSULTING PHYSICIAN:  Biju Jeter MD  ATTENDING PHYSICIAN:  Power Santana MD,  Power Santana MD       REASON FOR CONSULT:    Leukopenia likely from Dilantin.    HISTORY OF PRESENT ILLNESS:    Aurelio Peters is a healthy 59 year old male who is seen for initial hematology consultation visit on November 30, 2021 for evaluation of leukopenia.    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  Prior to starting Dilatin he had a cbc which revealed a WBC count of 4.8 with a normal automated differential blood count.  The hemoglobin was 13.7 and the platelet count was normal.    He continues to have grand mal seizures on generic phenytoin as the phenytoin level would fluctuate significantly over the course of time.  He was ultimately switched to where he would only take brand name Dilantin.  He had been seen intermittently at [facility] during this time.    In the early 2000's as he had been switched from generic phenytoin to Dilantin the white blood cell count tended to trend to a lower level of normal.    Laboratory studies performed in March 2021 have been unremarkable with a WBC count of 3.8.  The automated differential blood count was unremarkable with 66% neutrophils, 21% lymphocytes and 10% monocytes.  The hemoglobin level was 14.5 and the platelet count was 235,000.  On September 27, 2021 his CBC at that time revealed a WBC count of 2.9 with 61% neutrophils, 26% lymphocytes 11% monocytes.  The hemoglobin level and hematocrit values were again normal as was the platelet count.  When a follow-up CBC was checked on November 3, 2021 the WBC count was 3.1 with a normal automated differential blood count.  The absolute neutrophil count at that time was 1.8.  The hemoglobin level was 14.5 and the platelet  Will address at today's office visit.   count was 231,000.  The MCV was normal.    At no point in time has had difficulty with infections.  He has had no anorexia or weight loss.  He has had no respiratory, GI or  issues.  He feels exceedingly well.    PAST MEDICAL HISTORY:  Past Medical History:   Diagnosis Date   • Abnormal serum level of alkaline phosphatase    • Benign essential HTN    • Hydrocele    • Neutropenia (CMS/HCC)    • Seizure disorder (CMS/HCC) 1991    grand mal seizure        SURGICAL HISTORY:  Past Surgical History:   Procedure Laterality Date   • Open access colonoscopy  2012   • Tonsillectomy          SOCIAL HISTORY:  Social History     Tobacco Use   • Smoking status: Never Smoker   • Smokeless tobacco: Never Used   Vaping Use   • Vaping Use: never used   Substance Use Topics   • Alcohol use: No   • Drug use: No       FAMILY HISTORY:    Family History   Problem Relation Age of Onset   • Hypertension Mother    • Hypertension Father         ALLERGIES  ALLERGIES:  Dairy digestive   (food or med)    MEDICATIONS      Current Outpatient Medications   Medication Sig Dispense Refill   • Calcium Carb-Cholecalciferol (Calcium 500 + D) 500-125 MG-UNIT Tab Take 1 capsule by mouth.     • Dilantin 100 MG ER capsule 460 mg daily 120 capsule 0   • phenyTOIN (Dilantin) 30 MG ER capsule TAKE 1 CAPSULE BY MOUTH TWICE DAILY IN ADDITION TO THE 100MG CAPSULES 120 capsule 4   • lisinopril (ZESTRIL) 10 MG tablet Take 1 tablet by mouth daily. 90 tablet 3     No current facility-administered medications for this visit.        SUBJECTIVE:     He feels exceedingly well.  No anorexia or weight loss.  No fevers, chills or sweats.  No respiratory, GI or  issues.    He has had all 3 COVID-19 vaccinations including the COVID-19 booster vaccination.  He has had the influenza vaccination for this year.    All systems reviewed and are negative with the except of the findings noted in the HPI.      VITAL SIGNS:    Blood pressure 132/78, pulse 72, temperature 98.5 °F  (36.9 °C), height 5' 8.5\" (1.74 m), weight 89 kg (196 lb 3.2 oz).    Oncology Encounter Vitals [11/30/21 1417]   ONC OP Encounter Vitals Group      /78      Heart Rate 72      Resp       Temp 98.5 °F (36.9 °C)      Temp src       SpO2       Weight 196 lb 3.2 oz (89 kg)      Height 5' 8.5\" (1.74 m)      Pain Score  0      Pain Location       Pain Education?       BSA (Calculated - m2) - [employer] 2.04      BMI (Calculated) 29.4       Weight    11/30/21 1417   Weight: 89 kg (196 lb 3.2 oz)        PHYSICAL EXAMINATION:    ECOG Performance Status:   ECOG [11/30/21 1504]   ECOG Performance Status 0       General:  No acute distress.  Skin:  Skin turgor normal.  No rash.   Head:  Normocephalic, without obvious abnormality,.  Eyes:  Conjunctiva clear, No icterus,    Throat:  Mucous membranes normal.   Neck: Supple. No thyromegaly.  No enlarged cervical, supraclavicular or infraclavicular lymphadenopathy.  Lungs:   Clear. No rhonchi, wheezing or rales.  Symmetrical breath sounds.   Heart:  Regular rate and rhythm. S1 and S2 normal. No murmur, rub or gallop. No peripheral edema  Abdomen:  Soft, non-tender, bowel sounds normal.  No masses. No hepatomegaly.  No splenomegaly.  Extremities:   Normal, No cyanosis or edema.  No petechiae or ecchymosis, No signs of a DVT, no localized spine tenderness.  Lymph Nodes:  No axillary, epitrochlear, inguinal or femoral lymphadenopathy.  Musculoskeletal: Symmetrical strength.  Neurologic:  Alert and oriented x 3.  Normal affect.  No focal motor defects.  Cooperative. Normal gait.       LABORATORY RESULTS:    Office Visit on 11/03/2021   Component Date Value Ref Range Status   • Sodium 11/03/2021 142  135 - 145 mmol/L Final   • Potassium 11/03/2021 4.9  3.4 - 5.1 mmol/L Final   • Chloride 11/03/2021 108* 98 - 107 mmol/L Final   • Carbon Dioxide 11/03/2021 29  21 - 32 mmol/L Final   • Anion Gap 11/03/2021 10  10 - 20 mmol/L Final   • Glucose 11/03/2021 94  70 - 99 mg/dL Final    • BUN 11/03/2021 12  6 - 20 mg/dL Final   • Creatinine 11/03/2021 0.86  0.67 - 1.17 mg/dL Final   • Glomerular Filtration Rate 11/03/2021 >90  >=60 Final    eGFR results = or >60 mL/min/1.73m2 = Normal kidney function. Estimated GFR calculated using the 2009 CKD-EPI creatinine equation.     • BUN/ Creatinine Ratio 11/03/2021 14  7 - 25 Final   • Calcium 11/03/2021 9.2  8.4 - 10.2 mg/dL Final   • WBC 11/03/2021 3.1* 4.2 - 11.0 K/mcL Final   • RBC 11/03/2021 4.68  4.50 - 5.90 mil/mcL Final   • HGB 11/03/2021 14.5  13.0 - 17.0 g/dL Final   • HCT 11/03/2021 43.0  39.0 - 51.0 % Final   • MCV 11/03/2021 91.9  78.0 - 100.0 fl Final   • MCH 11/03/2021 31.0  26.0 - 34.0 pg Final   • MCHC 11/03/2021 33.7  32.0 - 36.5 g/dL Final   • RDW-CV 11/03/2021 13.6  11.0 - 15.0 % Final   • RDW-SD 11/03/2021 46.2  39.0 - 50.0 fL Final   • PLT 11/03/2021 231  140 - 450 K/mcL Final   • NRBC 11/03/2021 0  <=0 /100 WBC Final   • Neutrophil, Percent 11/03/2021 60  % Final   • Lymphocytes, Percent 11/03/2021 25  % Final   • Mono, Percent 11/03/2021 12  % Final   • Eosinophils, Percent 11/03/2021 2  % Final   • Basophils, Percent 11/03/2021 1  % Final   • Immature Granulocytes 11/03/2021 0  % Final   • Absolute Neutrophils 11/03/2021 1.8  1.8 - 7.7 K/mcL Final   • Absolute Lymphocytes 11/03/2021 0.8* 1.0 - 4.0 K/mcL Final   • Absolute Monocytes 11/03/2021 0.4  0.3 - 0.9 K/mcL Final   • Absolute Eosinophils  11/03/2021 0.1  0.0 - 0.5 K/mcL Final   • Absolute Basophils 11/03/2021 0.0  0.0 - 0.3 K/mcL Final   • Absolute Immmature Granulocytes 11/03/2021 0.0  0.0 - 0.2 K/mcL Final        WBC (K/mcL)   Date Value   11/30/2021 5.5   11/03/2021 3.1 (L)   09/27/2021 2.9 (L)   03/29/2021 3.8 (L)       Absolute Neutrophils (K/mcL)   Date Value   11/30/2021 3.8   11/03/2021 1.8   09/27/2021 1.7 (L)   03/29/2021 2.5       HGB (g/dL)   Date Value   11/30/2021 14.3   11/03/2021 14.5   09/27/2021 14.1   03/29/2021 14.5       PLT (K/mcL)   Date Value    11/30/2021 235   11/03/2021 231   09/27/2021 219   03/29/2021 235       GOT/AST (Units/L)   Date Value   09/27/2021 17     GPT/ALT (Units/L)   Date Value   09/27/2021 30     No results found for: GGTP  Alkaline Phosphatase (Units/L)   Date Value   09/27/2021 145 (H)     Bilirubin, Total (mg/dL)   Date Value   09/27/2021 0.3       Sodium (mmol/L)   Date Value   11/03/2021 142     Potassium (mmol/L)   Date Value   11/03/2021 4.9     Chloride (mmol/L)   Date Value   11/03/2021 108 (H)     Carbon Dioxide (mmol/L)   Date Value   11/03/2021 29     BUN (mg/dL)   Date Value   11/03/2021 12     Creatinine (mg/dL)   Date Value   11/03/2021 0.86       Glucose (mg/dL)   Date Value   11/03/2021 94       RADIOLOGY RESULTS:    n/a      IMPRESSION/PLAN:     1.  Leukopenia.    He has had only modest leukopenia which seems to vary with regards to the extent of leukopenia over the course time.  This has been completely asymptomatic.  When switching from generic phenytoin to brand name Dilantin there have been a trend towards his having more frequently episodes periods when his neutrophil count was lower.  The automated differential blood count has always remained normal.  At no point in time is he had anemia or thrombocytopenia.  He has had no infectious complications.    I will check a vitamin B12 and folate level.  A repeat CBC from today looked more favorable.    In my opinion he generally has a low normal WBC count as his \"normal value\".  Brand-name Dilantin may be also causing him to have a slightly lower WBC count.    I do not think that he has a significant primary bone marrow disorder.  A bone marrow examination is not needed.  He does not require a peripheral blood Octavio mutation test such as a TEMPUS test looking for evidence of myeloma or a CHIP (Clonal hematopoiesis of indeterminate potential) mutation.  This would serve no value.    I recommend that he continue to have regular follow-up labs with his primary care  physician.  He should have a CBC checked approximately twice a year.      Recent PHQ 2/9 Score    PHQ 2:  Date Adult PHQ 2 Score Adult PHQ 2 Interpretation   11/30/2021 0 No further screening needed       PHQ 9:       Distress Score   Distress Management Group      Distress Scale (0-10)          Time Spent:    50  Minutes.  More than 50% of the time was spent in face to face discussion with patient and attendies.    Thank you very much for allowing me to participate in the care of this pleasant patient.      Biju Jeter MD    Discussed with or notes reviewed:  Patient

## 2022-02-04 ENCOUNTER — TELEPHONE (OUTPATIENT)
Dept: ORTHOPEDICS | Facility: CLINIC | Age: 47
End: 2022-02-04
Payer: MEDICAID

## 2022-02-04 NOTE — TELEPHONE ENCOUNTER
----- Message from Cindy Velez sent at 2/4/2022 10:15 AM CST -----  Contact: [phone]/ Self  Type:  Sooner Appointment Request     Caller is requesting a sooner appointment.  Caller declined first available appointment listed below.  Caller will not accept being placed on the waitlist and is requesting a message be sent to doctor.  Name of Caller: pt  When is the first available appointment? 03/18/2022  Symptoms or Reason: Left elbow pain  Would the patient rather a call back or a response via [facility]? Call back  Best Call Back Number: [phone]  Additional Information: New pt , had x-ray done yesterday

## 2022-02-07 DIAGNOSIS — E11.65 TYPE 2 DIABETES MELLITUS WITH HYPERGLYCEMIA, WITH LONG-TERM CURRENT USE OF INSULIN: ICD-10-CM

## 2022-02-07 DIAGNOSIS — Z79.4 TYPE 2 DIABETES MELLITUS WITH HYPERGLYCEMIA, WITH LONG-TERM CURRENT USE OF INSULIN: ICD-10-CM

## 2022-02-07 RX ORDER — FLASH GLUCOSE SENSOR
1 KIT MISCELLANEOUS
Qty: 6 KIT | Refills: 0 | Status: SHIPPED | OUTPATIENT
Start: 2022-02-07 | End: 2022-04-22 | Stop reason: SDUPTHER

## 2022-02-07 RX ORDER — FLASH GLUCOSE SCANNING READER
EACH MISCELLANEOUS
Qty: 1 EACH | Refills: 0 | Status: SHIPPED | OUTPATIENT
Start: 2022-02-07 | End: 2022-04-22 | Stop reason: SDUPTHER

## 2022-02-07 NOTE — TELEPHONE ENCOUNTER
----- Message from Shilpi Benitez MA sent at 2/7/2022  2:49 PM CST -----  Contact: Verenice connor/ [facility]    ----- Message -----  From: Isabel Tejeda  Sent: 2/7/2022   2:47 PM CST  To: Sherwin MARCOS Staff    Type:  Pharmacy Calling to Clarify an RX    Name of Caller:  Verenice  Pharmacy Name:    [place] [address] [address] [address]  Phone: [phone] Fax: [phone]    Prescription Name:  States that:  flash glucose sensor (FREESTYLE TANGELA 2 SENSOR) Kit was sent in, but it should have been:    FREESTYLE TANGELA 14 DAY READER [facility]    What do they need to clarify?:  See above.    Best Call Back Number:  [phone]    Additional Information:  Please call back.  Thanks.

## 2022-02-07 NOTE — TELEPHONE ENCOUNTER
----- Message from Sheba Dumont sent at 2/7/2022  1:15 PM CST -----  Contact: Vreenice  Type:  Pharmacy Calling to Clarify an RX    Name of Caller: Verenice    Pharmacy Name: [facility]    Prescription Name:     ALIVIA HONEYCUTT 14 DAY READER Misc    What do they need to clarify?: Insurance will only cover a 90 day supply     Best Call Back Number: [phone]    Additional Information:  Please resend a new prescription to pharmacy for a 90 day supply

## 2022-02-08 ENCOUNTER — TELEPHONE (OUTPATIENT)
Dept: FAMILY MEDICINE | Facility: CLINIC | Age: 47
End: 2022-02-08
Payer: MEDICAID

## 2022-02-08 DIAGNOSIS — D50.9 IRON DEFICIENCY ANEMIA, UNSPECIFIED IRON DEFICIENCY ANEMIA TYPE: Primary | ICD-10-CM

## 2022-02-08 NOTE — TELEPHONE ENCOUNTER
LOV 2/3/2022, [facility].  Pt requesting refill on ferrous [id number], no longer active in chart.

## 2022-02-09 RX ORDER — FERROUS SULFATE 324(65)MG
324 TABLET, DELAYED RELEASE (ENTERIC COATED) ORAL 2 TIMES DAILY
Qty: 180 TABLET | Refills: 0 | Status: SHIPPED | OUTPATIENT
Start: 2022-02-09 | End: 2022-05-10

## 2022-02-10 DIAGNOSIS — E11.42 DIABETIC POLYNEUROPATHY ASSOCIATED WITH TYPE 2 DIABETES MELLITUS: ICD-10-CM

## 2022-02-10 NOTE — PROGRESS NOTES
Past Medical History:   Diagnosis Date    Anemia     Anxiety     Diabetes mellitus     Encounter for blood transfusion     2    Hypertension     Obesity     Thyroid disease        Past Surgical History:   Procedure Laterality Date     SECTION      THYROIDECTOMY, PARTIAL         Current Outpatient Prescriptions   Medication Sig    acetaminophen (TYLENOL) 325 MG tablet Take 650 mg by mouth every 6 (six) hours as needed.    albiglutide (TANZEUM) 50 mg/0.5 mL PnIj Inject 50 mg into the skin every 7 days.    ALPRAZolam (XANAX) 0.5 MG tablet Take 1 tablet (0.5 mg total) by mouth On call Procedure for Anxiety.    fluticasone (FLONASE) 50 mcg/actuation nasal spray 1 spray by Each Nare route 2 (two) times daily as needed for Rhinitis.    ibuprofen (ADVIL,MOTRIN) 800 MG tablet Take 1 tablet (800 mg total) by mouth every 8 (eight) hours as needed for Pain.    INSULIN GLARGINE,HUM.REC.ANLOG (BASAGLAR KWIKPEN SUBQ) Inject 20 Units into the skin every evening.    lansoprazole (PREVACID) 30 MG capsule Take 1 capsule (30 mg total) by mouth once daily.    levothyroxine (SYNTHROID) 200 MCG tablet Take 1 tablet (200 mcg total) by mouth once daily.    levothyroxine (SYNTHROID) 300 MCG Tab Take 1 tablet (300 mcg total) by mouth before breakfast.    meloxicam (MOBIC) 7.5 MG tablet Take 1 tablet (7.5 mg total) by mouth once daily.    metformin (GLUCOPHAGE) 500 MG tablet Take 1 tablet (500 mg total) by mouth 2 (two) times daily with meals.    omeprazole (PRILOSEC) 40 MG capsule Take 1 capsule (40 mg total) by mouth once daily.    sulfamethoxazole-trimethoprim 800-160mg (BACTRIM DS) 800-160 mg Tab Take 1 tablet by mouth 2 (two) times daily.     No current facility-administered medications for this visit.        Review of patient's allergies indicates:  No Known Allergies    Family History   Problem Relation Age of Onset    Cancer Mother     Hypertension Mother     Diabetes Mother     Cancer Father   "      Social History     Social History    Marital status: Single     Spouse name: N/A    Number of children: N/A    Years of education: N/A     Occupational History    Not on file.     Social History Main Topics    Smoking status: Never Smoker    Smokeless tobacco: Never Used    Alcohol use No    Drug use: No    Sexual activity: No     Other Topics Concern    Not on file     Social History Narrative    No narrative on file       Chief Complaint:   Chief Complaint   Patient presents with    Right Hip - Pain       Consulting Physician: Self, Aaareferral    History of present illness:    This is a 42 y.o. female who complains of right hip pain localized laterally. Pain up to 10/10 with activities. No injury.    Review of Systems:    Constitution: Denies chills, fever, and sweats.  HENT: Denies headaches or blurry vision.  Cardiovascular: Denies chest pain or irregular heart beat.  Respiratory: Denies cough or shortness of breath.  Gastrointestinal: Denies abdominal pain, nausea, or vomiting.  Musculoskeletal:  Denies muscle cramps.  Neurological: Denies dizziness or focal weakness.  Psychiatric/Behavioral: Normal mental status.  Hematologic/Lymphatic: Denies bleeding problem or easy bruising/bleeding.  Skin: Denies rash or suspicious lesions.    Examination:    Vital Signs:    Vitals:    12/12/17 0932   BP: 130/83   Pulse: 78   Weight: 115.2 kg (254 lb)   Height: 5' 6" (1.676 m)   PainSc: 10-Worst pain ever   PainLoc: Knee       Body mass index is 41 kg/m².    This a well-developed, well nourished patient in no acute distress.    Alert and oriented x 3 and cooperative to examination.       Physical Exam: Right Hip Exam    Gait:   Normal    Skin  Rash:   None  Scars:   None    Inspection  Erythema:  None  Bruising:  None  Swelling:  None  Masses:  None  Lymphadenopathy: None    Range of Motion  Flexion:  150°  Extension:  0°  External Rotation: 50°  Internal Rotation: 15°  Abduction:  50°    No pain with " hip range of motion.    Straight Leg Raise: Negative  Log Roll:  Negative    Tenderness  Groin:   Negative  Greater Trochanter: Positive    Strength:  5/5    Stability:  Normal    Sensation:  Intact    Vascular  Pulses:  Palpable distally          Imaging: X-rays ordered and reviewed today personally of the right hp appear normal.        Assessment: Greater trochanteric bursitis, right  -     Large Joint Aspiration/Injection        Plan:  Pain for months. No groin pain. Injected and given PT. PRN.      DISCLAIMER: This note may have been dictated using voice recognition software and may contain grammatical errors.     NOTE: Consult report sent to referring provider via [facility] [employer]MR.   No - the patient is unable to be screened due to medical condition

## 2022-02-11 RX ORDER — GABAPENTIN 300 MG/1
300 CAPSULE ORAL 3 TIMES DAILY
Qty: 90 CAPSULE | Refills: 11 | OUTPATIENT
Start: 2022-02-11 | End: 2023-09-04

## 2022-02-15 ENCOUNTER — HOSPITAL ENCOUNTER (OUTPATIENT)
Dept: RADIOLOGY | Facility: HOSPITAL | Age: 47
Discharge: HOME | End: 2022-02-15
Attending: NURSE PRACTITIONER
Payer: MEDICAID

## 2022-02-15 DIAGNOSIS — R22.1 NECK MASS: ICD-10-CM

## 2022-02-15 PROCEDURE — 76536 US EXAM OF HEAD AND NECK: CPT | Mod: 26,,, | Performed by: RADIOLOGY

## 2022-02-15 PROCEDURE — 76536 US EXAM OF HEAD AND NECK: CPT | Mod: TC

## 2022-02-15 PROCEDURE — 76536 US SOFT TISSUE HEAD NECK THYROID: ICD-10-PCS | Mod: 26,,, | Performed by: RADIOLOGY

## 2022-02-22 DIAGNOSIS — F99 INSOMNIA DUE TO OTHER MENTAL DISORDER: ICD-10-CM

## 2022-02-22 DIAGNOSIS — F51.05 INSOMNIA DUE TO OTHER MENTAL DISORDER: ICD-10-CM

## 2022-02-22 RX ORDER — TRAZODONE HYDROCHLORIDE 50 MG/1
50 TABLET ORAL NIGHTLY
Qty: 30 TABLET | Refills: 11 | Status: CANCELLED | OUTPATIENT
Start: 2022-02-22 | End: 2023-02-22

## 2022-02-22 NOTE — TELEPHONE ENCOUNTER
Spoke to patient about Results, Patient Stated that she was still in pain and would call and make another appointment. She is also requesting a refill on pain medications.

## 2022-02-22 NOTE — TELEPHONE ENCOUNTER
----- Message from Tori Castanon sent at 2/22/2022 12:23 PM CST -----  Type:  RX Refill Request    Who Called:  pt  Refill or New Rx:  refill  RX Name and Strength:  traZODone (DESYREL) 50 MG tablet    Take 1 tablet (50 mg total) by mouth every evening      Preferred Pharmacy with phone number:    [facility] [address] [facility] [address] [facility]  [address] [address]  Phone: [phone] Fax: [phone]      Local or Mail Order:  local  Ordering Provider:    Clint Call Back Number:  [phone] (home)     Additional Information:

## 2022-02-23 RX ORDER — TRAZODONE HYDROCHLORIDE 50 MG/1
50 TABLET ORAL NIGHTLY
Qty: 30 TABLET | Refills: 11 | OUTPATIENT
Start: 2022-02-23 | End: 2023-09-04

## 2022-02-24 NOTE — TELEPHONE ENCOUNTER
Outgoing call to patient. She stated that VICTORINO Spears prescribed her a pain medication that really helped her arm pain but that she ran out. Could not find what medication that VICTORINO Spears had prescribed. Told the patient that I would sent a message to Ms. Spears to find out what was the best med for her. Patient verbalized understanding.       ----- Message from Krystin Lisa sent at 2/24/2022  1:46 PM CST -----  Contact: pt  Type: Needs Medical Advice    Who Called: pt      Best Call Back Number: [phone]       Requesting a call back regarding -pt is asking for a refill on pain medication please but its not on her med list   please call she said that's the only thing that's helps her arm.       Please Advise- Thank you

## 2022-02-24 NOTE — TELEPHONE ENCOUNTER
----- Message from Kristina Vazquez RN sent at 2/24/2022  3:16 PM CST -----  Regarding: arm pain  Contact: pt  I called this patient and she isn't sure which medication she had but she said it was for her arm pain and that you prescribed it. She said she is out now and is in a lot of pain. Please advise, thank you!  ----- Message -----  From: Krystin Lisa  Sent: 2/24/2022   1:49 PM CST  To: Regan Aguillon Staff    Type: Needs Medical Advice    Who Called: pt      Best Call Back Number: [phone]       Requesting a call back regarding -pt is asking for a refill on pain medication please but its not on her med list   please call she said that's the only thing that's helps her arm.       Please Advise- Thank you

## 2022-03-07 DIAGNOSIS — M25.522 LEFT ELBOW PAIN: Primary | ICD-10-CM

## 2022-03-07 RX ORDER — TRAMADOL HYDROCHLORIDE 50 MG/1
50 TABLET ORAL EVERY 8 HOURS PRN
Qty: 14 TABLET | Refills: 0 | Status: SHIPPED | OUTPATIENT
Start: 2022-03-07 | End: 2022-03-28

## 2022-03-07 RX ORDER — TRAMADOL HYDROCHLORIDE 50 MG/1
50 TABLET ORAL EVERY 12 HOURS PRN
COMMUNITY
End: 2022-03-07 | Stop reason: SDUPTHER

## 2022-03-07 NOTE — TELEPHONE ENCOUNTER
----- Message from Tori Castanon sent at 3/7/2022 11:25 AM CST -----  Type:  RX Refill Request    Who Called:  pt  Refill or New Rx:  refill  RX Name and Strength:  traMADoL (ULTRAM) 50 mg tablet Sig - Route: Take 1 tablet (50 mg total) by mouth every 12 (twelve) hours as needed for Pain.      Preferred Pharmacy with phone number:    [facility] [address] [facility] [address] [facility] [address]  Phone: [phone] Fax: [phone]      Best Call Back Number:  [phone] (home)           Additional Information:

## 2022-03-25 ENCOUNTER — TELEPHONE (OUTPATIENT)
Dept: FAMILY MEDICINE | Facility: CLINIC | Age: 47
End: 2022-03-25
Payer: MEDICAID

## 2022-03-25 NOTE — TELEPHONE ENCOUNTER
----- Message from Huber Kurtz sent at 3/25/2022  1:56 PM CDT -----  Contact: patient  Type: Needs Medical Advice  Who Called:  patient  Symptoms (please be specific):  sore throat, no fever  How long has patient had these symptoms:  1 week  Pharmacy name and phone #:    [facility] [address] [facility] [address] [employer] [address] [address]  Phone: [phone] Fax: [phone]      Best Call Back Number: [phone]  Additional Information: Patient called in and wanted to see if an antibiotic could be called in for her as the OTC is just not working.

## 2022-04-22 ENCOUNTER — TELEPHONE (OUTPATIENT)
Dept: ORTHOPEDICS | Facility: CLINIC | Age: 47
End: 2022-04-22
Payer: MEDICAID

## 2022-04-22 DIAGNOSIS — E11.42 TYPE 2 DIABETES MELLITUS WITH DIABETIC POLYNEUROPATHY, WITHOUT LONG-TERM CURRENT USE OF INSULIN: ICD-10-CM

## 2022-04-22 DIAGNOSIS — B37.89 CANDIDIASIS OF BREAST: ICD-10-CM

## 2022-04-22 DIAGNOSIS — F41.9 ANXIETY: ICD-10-CM

## 2022-04-22 DIAGNOSIS — Z79.4 TYPE 2 DIABETES MELLITUS WITH HYPERGLYCEMIA, WITH LONG-TERM CURRENT USE OF INSULIN: ICD-10-CM

## 2022-04-22 DIAGNOSIS — F43.21 ADJUSTMENT DISORDER WITH DEPRESSED MOOD: ICD-10-CM

## 2022-04-22 DIAGNOSIS — E11.42 DIABETIC POLYNEUROPATHY ASSOCIATED WITH TYPE 2 DIABETES MELLITUS: ICD-10-CM

## 2022-04-22 DIAGNOSIS — F51.05 INSOMNIA DUE TO OTHER MENTAL DISORDER: ICD-10-CM

## 2022-04-22 DIAGNOSIS — F99 INSOMNIA DUE TO OTHER MENTAL DISORDER: ICD-10-CM

## 2022-04-22 DIAGNOSIS — E03.9 ACQUIRED HYPOTHYROIDISM: ICD-10-CM

## 2022-04-22 DIAGNOSIS — E11.65 TYPE 2 DIABETES MELLITUS WITH HYPERGLYCEMIA, WITH LONG-TERM CURRENT USE OF INSULIN: ICD-10-CM

## 2022-04-22 DIAGNOSIS — E78.5 DYSLIPIDEMIA: ICD-10-CM

## 2022-04-22 RX ORDER — ESCITALOPRAM OXALATE 20 MG/1
20 TABLET ORAL DAILY
Qty: 14 TABLET | Refills: 0 | OUTPATIENT
Start: 2022-04-22 | End: 2023-09-04

## 2022-04-22 RX ORDER — INSULIN GLARGINE 100 [IU]/ML
15 INJECTION, SOLUTION SUBCUTANEOUS NIGHTLY
Qty: 15 ML | Refills: 0 | Status: SHIPPED | OUTPATIENT
Start: 2022-04-22 | End: 2023-05-30 | Stop reason: SDUPTHER

## 2022-04-22 RX ORDER — GLIPIZIDE 5 MG/1
5 TABLET, FILM COATED, EXTENDED RELEASE ORAL
Qty: 30 TABLET | Refills: 0 | Status: SHIPPED | OUTPATIENT
Start: 2022-04-22 | End: 2023-06-16 | Stop reason: ALTCHOICE

## 2022-04-22 RX ORDER — PEN NEEDLE, DIABETIC 30 GX3/16"
1 NEEDLE, DISPOSABLE MISCELLANEOUS 2 TIMES DAILY
Qty: 100 EACH | Refills: 2 | Status: SHIPPED | OUTPATIENT
Start: 2022-04-22

## 2022-04-22 RX ORDER — LEVOTHYROXINE SODIUM 200 UG/1
200 TABLET ORAL
Qty: 30 TABLET | Refills: 0 | Status: SHIPPED | OUTPATIENT
Start: 2022-04-22 | End: 2023-05-30 | Stop reason: SDUPTHER

## 2022-04-22 RX ORDER — CLONAZEPAM 0.5 MG/1
0.25 TABLET ORAL DAILY PRN
Qty: 30 TABLET | Refills: 0 | OUTPATIENT
Start: 2022-04-22 | End: 2022-06-21

## 2022-04-22 RX ORDER — ATORVASTATIN CALCIUM 10 MG/1
10 TABLET, FILM COATED ORAL DAILY
Qty: 30 TABLET | Refills: 0 | Status: SHIPPED | OUTPATIENT
Start: 2022-04-22 | End: 2023-08-17 | Stop reason: SDUPTHER

## 2022-04-22 RX ORDER — FLASH GLUCOSE SCANNING READER
EACH MISCELLANEOUS
Qty: 1 EACH | Refills: 0 | Status: SHIPPED | OUTPATIENT
Start: 2022-04-22

## 2022-04-22 RX ORDER — METFORMIN HYDROCHLORIDE 500 MG/1
500 TABLET, EXTENDED RELEASE ORAL 2 TIMES DAILY WITH MEALS
Qty: 60 TABLET | Refills: 0 | Status: SHIPPED | OUTPATIENT
Start: 2022-04-22

## 2022-04-22 RX ORDER — FLASH GLUCOSE SENSOR
1 KIT MISCELLANEOUS
Qty: 6 KIT | Refills: 0 | Status: SHIPPED | OUTPATIENT
Start: 2022-04-22

## 2022-04-22 NOTE — TELEPHONE ENCOUNTER
Requested Prescriptions     Pending Prescriptions Disp Refills    nystatin (MYCOSTATIN) ointment 30 g 2     Sig: Apply topically 2 (two) times daily. for 14 days    tiZANidine (ZANAFLEX) 4 MG tablet 20 tablet 0     Sig: Take 1 tablet (4 mg total) by mouth every 6 (six) hours as needed (muscle spasm).    gabapentin (NEURONTIN) 300 MG capsule 90 capsule 11     Sig: Take 1 capsule (300 mg total) by mouth 3 (three) times daily.    traZODone (DESYREL) 50 MG tablet 30 tablet 11     Sig: Take 1 tablet (50 mg total) by mouth every evening.         Last OV: 02/03/2022

## 2022-04-22 NOTE — TELEPHONE ENCOUNTER
Left a VM after 4 attempts to contact  to inform her that an appt. has been scheduled on 5/9/22 @ 1:00 PM with MARGARET Bourgeois PA-C in the Endocrinology Clinic.

## 2022-04-22 NOTE — TELEPHONE ENCOUNTER
I will only refill 30 days of medication; she needs to schedule a visit with endocrine due to her uncontrolled DM and lab,. Last A1c 12.1 eight months ago  I saw her in 2/22 for an acute visit only

## 2022-04-22 NOTE — TELEPHONE ENCOUNTER
Pt is requesting medication refill on Clonazepam 0.5 mg, Lexapro 20 mg   Last refill: 7/22/21  Last visit: 7/22/21  Follow Up: none scheduled

## 2022-04-22 NOTE — TELEPHONE ENCOUNTER
LV 1/11/21 with Dr. Courtney. Reports she does not want to see Dr. Courtney anymore due to she has been seeing Mrs. Pal Spears NP for her Diabetes.

## 2022-04-26 ENCOUNTER — TELEPHONE (OUTPATIENT)
Dept: FAMILY MEDICINE | Facility: CLINIC | Age: 47
End: 2022-04-26

## 2022-04-27 RX ORDER — TRAZODONE HYDROCHLORIDE 50 MG/1
50 TABLET ORAL NIGHTLY
Qty: 30 TABLET | Refills: 11 | OUTPATIENT
Start: 2022-04-27 | End: 2023-04-27

## 2022-04-27 RX ORDER — NYSTATIN 100000 U/G
OINTMENT TOPICAL 2 TIMES DAILY
Qty: 30 G | Refills: 2 | OUTPATIENT
Start: 2022-04-27 | End: 2022-05-11

## 2022-04-27 RX ORDER — TIZANIDINE 4 MG/1
4 TABLET ORAL EVERY 6 HOURS PRN
Qty: 20 TABLET | Refills: 0 | OUTPATIENT
Start: 2022-04-27

## 2022-04-27 RX ORDER — GABAPENTIN 300 MG/1
300 CAPSULE ORAL 3 TIMES DAILY
Qty: 90 CAPSULE | Refills: 11 | OUTPATIENT
Start: 2022-04-27 | End: 2023-04-27

## 2022-04-28 ENCOUNTER — TELEPHONE (OUTPATIENT)
Dept: FAMILY MEDICINE | Facility: CLINIC | Age: 47
End: 2022-04-28
Payer: MEDICAID

## 2022-04-28 NOTE — TELEPHONE ENCOUNTER
----- Message from Sophie Aburto MA sent at 4/28/2022  9:34 AM CDT -----  Type: Needs Medical Advice  [facility]  Best Call Back Number: [phone]  Additional Information: pharm needs clarification on release for glipiZIDE.  Please call pharm to confirm    Pharm states this is the second time they have called for this with no response.

## 2022-04-28 NOTE — TELEPHONE ENCOUNTER
Returned call to Phil at the pharmacy in regards to Glipizide prescription. Answered all questions and concerns. Verbalized understanding.

## 2022-07-01 ENCOUNTER — TELEPHONE (OUTPATIENT)
Dept: PSYCHIATRY | Facility: CLINIC | Age: 47
End: 2022-07-01
Payer: MEDICAID

## 2022-07-01 NOTE — TELEPHONE ENCOUNTER
Spoke to patient to reschedule missed appointment from 6/30/22 with Reynaldo Mckeon NP. She states she got caught up in court and was unable to make the appointment. Patient declined to reschedule. She will call back when she is ready to make an appointment.

## 2023-05-30 DIAGNOSIS — E11.42 TYPE 2 DIABETES MELLITUS WITH DIABETIC POLYNEUROPATHY, WITHOUT LONG-TERM CURRENT USE OF INSULIN: ICD-10-CM

## 2023-05-30 DIAGNOSIS — E03.9 ACQUIRED HYPOTHYROIDISM: ICD-10-CM

## 2023-06-01 RX ORDER — LEVOTHYROXINE SODIUM 200 UG/1
200 TABLET ORAL
Qty: 30 TABLET | Refills: 0 | Status: SHIPPED | OUTPATIENT
Start: 2023-06-01 | End: 2023-08-22 | Stop reason: SDUPTHER

## 2023-06-01 RX ORDER — INSULIN GLARGINE 100 [IU]/ML
15 INJECTION, SOLUTION SUBCUTANEOUS NIGHTLY
Qty: 15 ML | Refills: 0 | Status: SHIPPED | OUTPATIENT
Start: 2023-06-01 | End: 2023-08-17 | Stop reason: SDUPTHER

## 2023-06-01 NOTE — TELEPHONE ENCOUNTER
Will refill only for 30 days-needs labs and annual visit scheduled-schedule labs for 422/22 and office visit

## 2023-06-09 DIAGNOSIS — Z79.4 UNCONTROLLED DIABETES MELLITUS WITH HYPERGLYCEMIA, WITH LONG-TERM CURRENT USE OF INSULIN: Primary | ICD-10-CM

## 2023-06-09 DIAGNOSIS — E11.65 UNCONTROLLED DIABETES MELLITUS WITH HYPERGLYCEMIA, WITH LONG-TERM CURRENT USE OF INSULIN: Primary | ICD-10-CM

## 2023-06-09 NOTE — TELEPHONE ENCOUNTER
LOV: 2/3/22  NOV: 7/3/23    Pal wrote rx for insulin glargine solostar u100 PA was denied there preferred insulin in lispro.

## 2023-06-16 DIAGNOSIS — Z79.4 UNCONTROLLED DIABETES MELLITUS WITH HYPERGLYCEMIA, WITH LONG-TERM CURRENT USE OF INSULIN: Primary | ICD-10-CM

## 2023-06-16 DIAGNOSIS — E11.65 UNCONTROLLED DIABETES MELLITUS WITH HYPERGLYCEMIA, WITH LONG-TERM CURRENT USE OF INSULIN: Primary | ICD-10-CM

## 2023-06-16 RX ORDER — INSULIN LISPRO 100 [IU]/ML
20 INJECTION, SOLUTION INTRAVENOUS; SUBCUTANEOUS
Qty: 18 ML | Refills: 1 | OUTPATIENT
Start: 2023-06-16 | End: 2023-09-04

## 2023-06-16 NOTE — PROGRESS NOTES
Humalog insulin prescription sent x1 month.  Patient needs lab work to be drawn before next appointment.  She needs appointment with dietitian optometrist, consultant pharmacist, and bariatric surgery.  Patient has high risk of stroke a heart attack.  May benefit from going to [facility].

## 2023-06-19 ENCOUNTER — TELEPHONE (OUTPATIENT)
Dept: BARIATRICS | Facility: CLINIC | Age: 48
End: 2023-06-19
Payer: MEDICAID

## 2023-06-19 ENCOUNTER — TELEPHONE (OUTPATIENT)
Dept: PHARMACY | Facility: CLINIC | Age: 48
End: 2023-06-19
Payer: MEDICAID

## 2023-06-19 NOTE — TELEPHONE ENCOUNTER
Unfortunately, The Pharmacy Patient Assistance Team is unable to assist  at this time due to the following reasons      Patient has Medicaid/[facility]         Pharmacy Patient Assistance Team

## 2023-06-20 ENCOUNTER — TELEPHONE (OUTPATIENT)
Dept: FAMILY MEDICINE | Facility: CLINIC | Age: 48
End: 2023-06-20
Payer: MEDICAID

## 2023-06-20 RX ORDER — INSULIN LISPRO 100 [IU]/ML
20 INJECTION, SOLUTION INTRAVENOUS; SUBCUTANEOUS
Qty: 18 ML | Refills: 11 | OUTPATIENT
Start: 2023-06-20 | End: 2024-06-19

## 2023-06-21 ENCOUNTER — TELEPHONE (OUTPATIENT)
Dept: DIABETES | Facility: CLINIC | Age: 48
End: 2023-06-21
Payer: MEDICAID

## 2023-06-21 NOTE — TELEPHONE ENCOUNTER
Patient could call [facility].  The have comprehensive medical care that will include medications and therapy.

## 2023-07-25 ENCOUNTER — TELEPHONE (OUTPATIENT)
Dept: FAMILY MEDICINE | Facility: CLINIC | Age: 48
End: 2023-07-25
Payer: MEDICAID

## 2023-07-26 ENCOUNTER — TELEPHONE (OUTPATIENT)
Dept: FAMILY MEDICINE | Facility: CLINIC | Age: 48
End: 2023-07-26
Payer: MEDICAID

## 2023-07-26 NOTE — TELEPHONE ENCOUNTER
Called patient to advise appointment has been scheduled and placed on the wait list. Verbalized understanding.

## 2023-07-26 NOTE — TELEPHONE ENCOUNTER
----- Message from Vicenta Wilder sent at 7/26/2023 10:14 AM CDT -----  Type: Needs Medical Advice  Who Called:  pt  Symptoms (please be specific):  pt said she called yesterday to get an appt with the NP and no one have yet to return her call--I did explain the call back policy to pt-- said she need to speak to the nurse--please call and advise  Best Call Back Number:  [phone]     Additional Information: thank you

## 2023-08-17 ENCOUNTER — OFFICE VISIT (OUTPATIENT)
Dept: FAMILY MEDICINE | Facility: CLINIC | Age: 48
End: 2023-08-17
Payer: MEDICAID

## 2023-08-17 VITALS
TEMPERATURE: 99 F | DIASTOLIC BLOOD PRESSURE: 90 MMHG | OXYGEN SATURATION: 100 % | HEART RATE: 82 BPM | HEIGHT: 66 IN | SYSTOLIC BLOOD PRESSURE: 170 MMHG | BODY MASS INDEX: 37.52 KG/M2 | WEIGHT: 233.44 LBS

## 2023-08-17 DIAGNOSIS — E78.5 DYSLIPIDEMIA: ICD-10-CM

## 2023-08-17 DIAGNOSIS — Z79.4 UNCONTROLLED DIABETES MELLITUS WITH HYPERGLYCEMIA, WITH LONG-TERM CURRENT USE OF INSULIN: ICD-10-CM

## 2023-08-17 DIAGNOSIS — Z76.0 MEDICATION REFILL: ICD-10-CM

## 2023-08-17 DIAGNOSIS — E03.9 HYPOTHYROIDISM, UNSPECIFIED TYPE: ICD-10-CM

## 2023-08-17 DIAGNOSIS — E11.65 UNCONTROLLED DIABETES MELLITUS WITH HYPERGLYCEMIA, WITH LONG-TERM CURRENT USE OF INSULIN: ICD-10-CM

## 2023-08-17 DIAGNOSIS — B37.31 VULVOVAGINAL CANDIDIASIS: ICD-10-CM

## 2023-08-17 DIAGNOSIS — R10.9 FLANK PAIN: ICD-10-CM

## 2023-08-17 DIAGNOSIS — I10 ESSENTIAL HYPERTENSION: ICD-10-CM

## 2023-08-17 DIAGNOSIS — Z11.3 SCREENING FOR STD (SEXUALLY TRANSMITTED DISEASE): Primary | ICD-10-CM

## 2023-08-17 LAB
BACTERIA #/AREA URNS AUTO: ABNORMAL /HPF
BILIRUB UR QL STRIP: NEGATIVE
CLARITY UR REFRACT.AUTO: CLEAR
COLOR UR AUTO: YELLOW
GLUCOSE UR QL STRIP: ABNORMAL
HGB UR QL STRIP: NEGATIVE
KETONES UR QL STRIP: NEGATIVE
LEUKOCYTE ESTERASE UR QL STRIP: NEGATIVE
MICROSCOPIC COMMENT: ABNORMAL
NITRITE UR QL STRIP: NEGATIVE
PH UR STRIP: 6 [PH] (ref 5–8)
PROT UR QL STRIP: ABNORMAL
RBC #/AREA URNS AUTO: 10 /HPF (ref 0–4)
SP GR UR STRIP: 1.02 (ref 1–1.03)
SQUAMOUS #/AREA URNS AUTO: 6 /HPF
URN SPEC COLLECT METH UR: ABNORMAL
WBC #/AREA URNS AUTO: 14 /HPF (ref 0–5)
YEAST UR QL AUTO: ABNORMAL

## 2023-08-17 PROCEDURE — 99999 PR PBB SHADOW E&M-EST. PATIENT-LVL IV: CPT | Mod: PBBFAC,,, | Performed by: NURSE PRACTITIONER

## 2023-08-17 PROCEDURE — 3077F SYST BP >= 140 MM HG: CPT | Mod: CPTII,,, | Performed by: NURSE PRACTITIONER

## 2023-08-17 PROCEDURE — 3008F PR BODY MASS INDEX (BMI) DOCUMENTED: ICD-10-PCS | Mod: CPTII,,, | Performed by: NURSE PRACTITIONER

## 2023-08-17 PROCEDURE — 3008F BODY MASS INDEX DOCD: CPT | Mod: CPTII,,, | Performed by: NURSE PRACTITIONER

## 2023-08-17 PROCEDURE — 99213 OFFICE O/P EST LOW 20 MIN: CPT | Mod: S$PBB,,, | Performed by: NURSE PRACTITIONER

## 2023-08-17 PROCEDURE — 1160F RVW MEDS BY RX/DR IN RCRD: CPT | Mod: CPTII,,, | Performed by: NURSE PRACTITIONER

## 2023-08-17 PROCEDURE — 3077F PR MOST RECENT SYSTOLIC BLOOD PRESSURE >= 140 MM HG: ICD-10-PCS | Mod: CPTII,,, | Performed by: NURSE PRACTITIONER

## 2023-08-17 PROCEDURE — 99999 PR PBB SHADOW E&M-EST. PATIENT-LVL IV: ICD-10-PCS | Mod: PBBFAC,,, | Performed by: NURSE PRACTITIONER

## 2023-08-17 PROCEDURE — 87591 N.GONORRHOEAE DNA AMP PROB: CPT | Performed by: NURSE PRACTITIONER

## 2023-08-17 PROCEDURE — 1159F PR MEDICATION LIST DOCUMENTED IN MEDICAL RECORD: ICD-10-PCS | Mod: CPTII,,, | Performed by: NURSE PRACTITIONER

## 2023-08-17 PROCEDURE — 3080F PR MOST RECENT DIASTOLIC BLOOD PRESSURE >= 90 MM HG: ICD-10-PCS | Mod: CPTII,,, | Performed by: NURSE PRACTITIONER

## 2023-08-17 PROCEDURE — 81001 URINALYSIS AUTO W/SCOPE: CPT | Performed by: NURSE PRACTITIONER

## 2023-08-17 PROCEDURE — 99213 PR OFFICE/OUTPT VISIT, EST, LEVL III, 20-29 MIN: ICD-10-PCS | Mod: S$PBB,,, | Performed by: NURSE PRACTITIONER

## 2023-08-17 PROCEDURE — 87086 URINE CULTURE/COLONY COUNT: CPT | Performed by: NURSE PRACTITIONER

## 2023-08-17 PROCEDURE — 99214 OFFICE O/P EST MOD 30 MIN: CPT | Mod: PBBFAC,PO | Performed by: NURSE PRACTITIONER

## 2023-08-17 PROCEDURE — 1160F PR REVIEW ALL MEDS BY PRESCRIBER/CLIN PHARMACIST DOCUMENTED: ICD-10-PCS | Mod: CPTII,,, | Performed by: NURSE PRACTITIONER

## 2023-08-17 PROCEDURE — 1159F MED LIST DOCD IN RCRD: CPT | Mod: CPTII,,, | Performed by: NURSE PRACTITIONER

## 2023-08-17 PROCEDURE — 3080F DIAST BP >= 90 MM HG: CPT | Mod: CPTII,,, | Performed by: NURSE PRACTITIONER

## 2023-08-17 RX ORDER — INSULIN GLARGINE 100 [IU]/ML
15 INJECTION, SOLUTION SUBCUTANEOUS NIGHTLY
Qty: 15 ML | Refills: 5 | OUTPATIENT
Start: 2023-08-17 | End: 2023-09-04

## 2023-08-17 RX ORDER — FERROUS SULFATE 325(65) MG
325 TABLET, DELAYED RELEASE (ENTERIC COATED) ORAL
COMMUNITY

## 2023-08-17 RX ORDER — FLUCONAZOLE 150 MG/1
150 TABLET ORAL DAILY
Qty: 1 TABLET | Refills: 0 | Status: SHIPPED | OUTPATIENT
Start: 2023-08-17 | End: 2023-08-18

## 2023-08-17 RX ORDER — NAPROXEN 500 MG/1
500 TABLET ORAL 2 TIMES DAILY
COMMUNITY

## 2023-08-17 RX ORDER — ATORVASTATIN CALCIUM 10 MG/1
10 TABLET, FILM COATED ORAL DAILY
Qty: 30 TABLET | Refills: 5 | Status: SHIPPED | OUTPATIENT
Start: 2023-08-17

## 2023-08-17 RX ORDER — AMLODIPINE BESYLATE 10 MG/1
10 TABLET ORAL DAILY
Qty: 30 TABLET | Refills: 5 | Status: SHIPPED | OUTPATIENT
Start: 2023-08-17

## 2023-08-17 NOTE — PROGRESS NOTES
Subjective:       Patient ID: Haydee Allen is a 48 y.o. female.    Chief Complaint: No chief complaint on file.     HPI   47 y/o female patient with medical problems listed below presents for right flank for 3 days. Denies nausea, vomiting, abdominal pain, urinary or bowel symptoms, fever, chills.     Patient requests STD screening. States has been sexually active with the same male partner for 4 months. Denies vaginal discharges but endorses vulva itching. States used over the counter cream for candida but no relief.     She requests medication refill of insulin. Noted that patient last seen in the clinic was in 2/2022. She states was seen by a doctor in primary clinic of [place] in 9/2022 and medications were changed.   Current taking Meds are Metformin 1000mg bid, Ferrous sulfate 325 mg 1 tab tid (states stopped taking it due to constipation), Naproxen 500 mg bid as needed for headache, Linzess 72 mcg 1 tab qd for constipation, atorvastatin 10 mg qd, amlodipine 10 mg qd, levothyroxine 200 mcg daily, lantus 15 u daily. States she ran out of lantus a month ago.     Noted initial BP was 170/90 and she states did not take amlodipine today.     Patient Active Problem List   Diagnosis    Chest pain on breathing    Hypertension    Type 2 diabetes mellitus with neurologic complication, without long-term current use of insulin    Class 2 severe obesity due to excess calories with serious comorbidity in adult    Diabetic polyneuropathy associated with type 2 diabetes mellitus    Meralgia paresthetica of right side    Anxiety    Obesity    Sciatica of right side    Gastroesophageal reflux disease without esophagitis    Hypothyroidism    Iron deficiency anemia    Pain localized to upper abdomen    Menopause syndrome    Mixed anxiety and depressive disorder    Right shoulder pain    Posture abnormality    Menorrhagia with regular cycle    Microcytic anemia    Anemia due to chronic blood loss    Adjustment disorder  with depressed mood    Insomnia due to other mental disorder        Review of patient's allergies indicates:   Allergen Reactions    Trulicity [dulaglutide] Rash       Past Surgical History:   Procedure Laterality Date     SECTION      LAPAROSCOPIC SALPINGO-OOPHORECTOMY Bilateral 7/3/2019    Procedure: SALPINGO-OOPHORECTOMY, LAPAROSCOPIC;  Surgeon: Darin Hale MD;  Location: [facility] OR;  Service: OB/GYN;  Laterality: Bilateral;    LAPAROSCOPIC TOTAL HYSTERECTOMY N/A 7/3/2019    Procedure: HYSTERECTOMY, TOTAL, LAPAROSCOPIC;  Surgeon: Darin Hale MD;  Location: [facility] OR;  Service: OB/GYN;  Laterality: N/A;    OPEN REDUCTION AND INTERNAL FIXATION (ORIF) OF INJURY OF ANKLE Left     THYROIDECTOMY, PARTIAL      TUBAL LIGATION            Current Outpatient Medications:     blood sugar diagnostic (TRUE METRIX GLUCOSE TEST STRIP MISC), True Metrix Glucose Test Strip  Use twice daily as directed, Disp: , Rfl:     ferrous sulfate 325 (65 FE) MG EC tablet, Take 325 mg by mouth 3 (three) times daily with meals., Disp: , Rfl:     flash glucose scanning reader (FREESTYLE TANGELA 14 DAY READER) Misc, Use with freestyle tangela to monitor glucose, Disp: 1 each, Rfl: 0    flash glucose sensor (FREESTYLE TANGELA 2 SENSOR) Kit, 1 Device by Misc.(Non-Drug; Combo Route) route every 14 (fourteen) days., Disp: 6 kit, Rfl: 0    levothyroxine (SYNTHROID) 200 MCG tablet, Take 1 tablet (200 mcg total) by mouth before breakfast. Take one extra tablet (400 mcg total) on Sundays., Disp: 30 tablet, Rfl: 0    linaCLOtide (LINZESS) 72 mcg Cap capsule, Take 72 mcg by mouth before breakfast., Disp: , Rfl:     metFORMIN (GLUCOPHAGE-XR) 500 MG ER 24hr tablet, Take 1 tablet (500 mg total) by mouth 2 (two) times daily with meals., Disp: 60 tablet, Rfl: 0    naproxen (NAPROSYN) 500 MG tablet, Take 500 mg by mouth 2 (two) times daily., Disp: , Rfl:     amLODIPine (NORVASC) 10 MG tablet, Take 1 tablet (10 mg total) by mouth once daily., Disp: 30  tablet, Rfl: 5    atorvastatin (LIPITOR) 10 MG tablet, Take 1 tablet (10 mg total) by mouth once daily., Disp: 30 tablet, Rfl: 5    butalbital-acetaminophen-caffeine -40 mg (FIORICET, ESGIC) -40 mg per tablet, Take 1 tablet by mouth every 4 (four) hours as needed., Disp: , Rfl:     celecoxib (CELEBREX) 200 MG capsule, Take 1 capsule (200 mg total) by mouth 2 (two) times daily as needed for Pain. Take with food. (Patient not taking: Reported on 8/17/2023), Disp: 60 capsule, Rfl: 3    clobetasoL (TEMOVATE) 0.05 % cream, Apply topically 2 (two) times daily. (Patient not taking: Reported on 8/17/2023), Disp: 15 g, Rfl: 0    clonazePAM (KLONOPIN) 0.5 MG tablet, Take 0.5 tablets (0.25 mg total) by mouth daily as needed for Anxiety., Disp: 30 tablet, Rfl: 0    docusate sodium (COLACE) 100 MG capsule, Stool Softener 100 mg capsule, Disp: , Rfl:     doxycycline (VIBRAMYCIN) 100 MG Cap, Take 100 mg by mouth every 12 (twelve) hours., Disp: , Rfl:     EScitalopram oxalate (LEXAPRO) 20 MG tablet, Take 1 tablet (20 mg total) by mouth once daily. for 14 days, Disp: 14 tablet, Rfl: 0    fluconazole (DIFLUCAN) 150 MG Tab, Take 1 tablet (150 mg total) by mouth once daily. for 1 day, Disp: 1 tablet, Rfl: 0    gabapentin (NEURONTIN) 300 MG capsule, Take 1 capsule (300 mg total) by mouth 3 (three) times daily., Disp: 90 capsule, Rfl: 11    hydroCHLOROthiazide (HYDRODIURIL) 12.5 MG Tab, Take 1 tablet (12.5 mg total) by mouth once daily., Disp: 90 tablet, Rfl: 1    insulin (LANTUS SOLOSTAR U-100 INSULIN) glargine 100 units/mL SubQ pen, Inject 15 Units into the skin every evening. Increase dose as directed until AM glucose at goal . Max daily dose 50 units. Last appointment over 1 year ago, follow-up needed to be able to approve additional refills., Disp: 15 mL, Rfl: 5    insulin lispro (HUMALOG U-100 INSULIN) 100 unit/mL injection, Inject 20 Units into the skin 3 (three) times daily before meals., Disp: 18 mL, Rfl: 1     "liraglutide 0.6 mg/0.1 mL, 18 mg/3 mL, subq PNIJ (VICTOZA 2-YU) 0.6 mg/0.1 mL (18 mg/3 mL) PnIj pen, Inject 1.8 mg into the skin once daily. (Patient not taking: Reported on 8/17/2023), Disp: 9 mL, Rfl: 0    lisinopriL (PRINIVIL,ZESTRIL) 40 MG tablet, Take 1 tablet (40 mg total) by mouth once daily. (Patient not taking: Reported on 8/17/2023), Disp: 90 tablet, Rfl: 1    nystatin (MYCOSTATIN) ointment, Apply topically 2 (two) times daily. for 14 days, Disp: 30 g, Rfl: 2    pen needle, diabetic (BD CINDY 2ND GEN PEN NEEDLE) 32 gauge x 5/32" Ndle, Inject 1 Box into the skin 2 (two) times a day. (Patient not taking: Reported on 8/17/2023), Disp: 100 each, Rfl: 2    tiZANidine (ZANAFLEX) 4 MG tablet, Take 1 tablet (4 mg total) by mouth every 6 (six) hours as needed (muscle spasm). (Patient not taking: Reported on 8/17/2023), Disp: 20 tablet, Rfl: 0    traZODone (DESYREL) 50 MG tablet, Take 1 tablet (50 mg total) by mouth every evening., Disp: 30 tablet, Rfl: 11  No current facility-administered medications for this visit.    Facility-Administered Medications Ordered in Other Visits:     lidocaine (PF) 10 mg/ml (1%) injection 2 mg, 0.2 mL, Intradermal, Once, Brenda Hauser MD    Review of Systems   Constitutional:  Negative for chills and fever.   Respiratory:  Negative for cough, chest tightness and shortness of breath.    Cardiovascular:  Negative for chest pain and palpitations.   Gastrointestinal:  Negative for abdominal pain.   Genitourinary:  Positive for flank pain. Negative for dysuria, frequency, urgency, vaginal discharge and vaginal pain.   Neurological:  Negative for dizziness and headaches.       Objective:   BP (!) 170/90 (BP Location: Left arm, Patient Position: Sitting, BP Method: Medium (Manual))   Pulse 82   Temp 98.6 °F (37 °C) (Oral)   Ht 5' 6" (1.676 m)   Wt 105.9 kg (233 lb 7.5 oz)   LMP 06/05/2019   SpO2 100%   BMI 37.68 kg/m²         Physical Exam  Constitutional:       General: She is " not in acute distress.     Appearance: Normal appearance.   HENT:      Head: Atraumatic.   Cardiovascular:      Rate and Rhythm: Normal rate and regular rhythm.      Pulses: Normal pulses.      Heart sounds: Normal heart sounds.   Pulmonary:      Effort: Pulmonary effort is normal.      Breath sounds: Normal breath sounds.   Abdominal:      General: Abdomen is flat. Bowel sounds are normal.      Palpations: Abdomen is soft.   Skin:     General: Skin is warm and dry.   Neurological:      Mental Status: She is alert and oriented to person, place, and time.   Psychiatric:         Mood and Affect: Mood normal.         Assessment:       1. Screening for STD (sexually transmitted disease)    2. Uncontrolled diabetes mellitus with hyperglycemia, with long-term current use of insulin    3. Hypothyroidism, unspecified type    4. Medication refill    5. Essential hypertension    6. Dyslipidemia    7. Vulvovaginal candidiasis    8. Flank pain        Plan:       1. Screening for STD (sexually transmitted disease)  - C. trachomatis/N. gonorrhoeae by AMP DNA [facility]; Urine; Future  - Hepatitis C Antibody; Future  - HIV 1/2 Ag/Ab (4th Gen); Future  - RPR; Future  - C. trachomatis/N. gonorrhoeae by AMP DNA [facility]; Urine    2. Uncontrolled diabetes mellitus with hyperglycemia, with long-term current use of insulin  - Hemoglobin A1C; Future  - CBC Auto Differential; Future  - Comprehensive Metabolic Panel; Future  - Lipid Panel; Future  - Ambulatory referral/consult to Endocrinology; Future  - Microalbumin/Creatinine Ratio, Urine; Future  - Ambulatory referral/consult to Ophthalmology; Future    3. Hypothyroidism, unspecified type  - T4, Free; Future  - TSH; Future    4. Medication refill  - insulin (LANTUS SOLOSTAR U-100 INSULIN) glargine 100 units/mL SubQ pen; Inject 15 Units into the skin every evening. Increase dose as directed until AM glucose at goal . Max daily dose 50 units. Last appointment over 1 year ago, follow-up  needed to be able to approve additional refills.  Dispense: 15 mL; Refill: 5    5. Essential hypertension  - Manually repeated BP was 170/90 but patient did not take amlodipine today   - amLODIPine (NORVASC) 10 MG tablet; Take 1 tablet (10 mg total) by mouth once daily.  Dispense: 30 tablet; Refill: 5    6. Dyslipidemia  - atorvastatin (LIPITOR) 10 MG tablet; Take 1 tablet (10 mg total) by mouth once daily.  Dispense: 30 tablet; Refill: 5    7. Vulvovaginal candidiasis  - fluconazole (DIFLUCAN) 150 MG Tab; Take 1 tablet (150 mg total) by mouth once daily. for 1 day  Dispense: 1 tablet; Refill: 0  - Ambulatory referral/consult to Gynecology; Future    8. Flank pain  - Urinalysis  - Urine culture  - US Retroperitoneal Complete; Future     Patient with be reevaluated in 4 weeks and md to establish care or sooner amanda Martinez NP

## 2023-08-18 ENCOUNTER — LAB VISIT (OUTPATIENT)
Dept: LAB | Facility: HOSPITAL | Age: 48
End: 2023-08-18
Payer: MEDICAID

## 2023-08-18 DIAGNOSIS — E03.9 HYPOTHYROIDISM, UNSPECIFIED TYPE: ICD-10-CM

## 2023-08-18 DIAGNOSIS — E11.65 UNCONTROLLED DIABETES MELLITUS WITH HYPERGLYCEMIA, WITH LONG-TERM CURRENT USE OF INSULIN: ICD-10-CM

## 2023-08-18 DIAGNOSIS — Z11.3 SCREENING FOR STD (SEXUALLY TRANSMITTED DISEASE): ICD-10-CM

## 2023-08-18 DIAGNOSIS — Z79.4 UNCONTROLLED DIABETES MELLITUS WITH HYPERGLYCEMIA, WITH LONG-TERM CURRENT USE OF INSULIN: ICD-10-CM

## 2023-08-18 LAB
ALBUMIN SERPL BCP-MCNC: 3.7 G/DL (ref 3.5–5.2)
ALP SERPL-CCNC: 76 U/L (ref 55–135)
ALT SERPL W/O P-5'-P-CCNC: 8 U/L (ref 10–44)
ANION GAP SERPL CALC-SCNC: 13 MMOL/L (ref 8–16)
AST SERPL-CCNC: 8 U/L (ref 10–40)
BASOPHILS # BLD AUTO: 0.05 K/UL (ref 0–0.2)
BASOPHILS NFR BLD: 0.7 % (ref 0–1.9)
BILIRUB SERPL-MCNC: 0.4 MG/DL (ref 0.1–1)
BUN SERPL-MCNC: 10 MG/DL (ref 6–20)
C TRACH DNA SPEC QL NAA+PROBE: NOT DETECTED
CALCIUM SERPL-MCNC: 9 MG/DL (ref 8.7–10.5)
CHLORIDE SERPL-SCNC: 101 MMOL/L (ref 95–110)
CHOLEST SERPL-MCNC: 175 MG/DL (ref 120–199)
CHOLEST/HDLC SERPL: 4.3 {RATIO} (ref 2–5)
CO2 SERPL-SCNC: 23 MMOL/L (ref 23–29)
CREAT SERPL-MCNC: 0.8 MG/DL (ref 0.5–1.4)
DIFFERENTIAL METHOD: ABNORMAL
EOSINOPHIL # BLD AUTO: 0.3 K/UL (ref 0–0.5)
EOSINOPHIL NFR BLD: 3.5 % (ref 0–8)
ERYTHROCYTE [DISTWIDTH] IN BLOOD BY AUTOMATED COUNT: 21.5 % (ref 11.5–14.5)
EST. GFR  (NO RACE VARIABLE): >60 ML/MIN/1.73 M^2
ESTIMATED AVG GLUCOSE: 301 MG/DL (ref 68–131)
GLUCOSE SERPL-MCNC: 280 MG/DL (ref 70–110)
HBA1C MFR BLD: 12.1 % (ref 4–5.6)
HCT VFR BLD AUTO: 24.7 % (ref 37–48.5)
HCV AB SERPL QL IA: NORMAL
HDLC SERPL-MCNC: 41 MG/DL (ref 40–75)
HDLC SERPL: 23.4 % (ref 20–50)
HGB BLD-MCNC: 6.3 G/DL (ref 12–16)
HIV 1+2 AB+HIV1 P24 AG SERPL QL IA: NORMAL
IMM GRANULOCYTES # BLD AUTO: 0.01 K/UL (ref 0–0.04)
IMM GRANULOCYTES NFR BLD AUTO: 0.1 % (ref 0–0.5)
LDLC SERPL CALC-MCNC: 114.6 MG/DL (ref 63–159)
LYMPHOCYTES # BLD AUTO: 2 K/UL (ref 1–4.8)
LYMPHOCYTES NFR BLD: 27.5 % (ref 18–48)
MCH RBC QN AUTO: 15 PG (ref 27–31)
MCHC RBC AUTO-ENTMCNC: 25.5 G/DL (ref 32–36)
MCV RBC AUTO: 59 FL (ref 82–98)
MONOCYTES # BLD AUTO: 0.4 K/UL (ref 0.3–1)
MONOCYTES NFR BLD: 5 % (ref 4–15)
N GONORRHOEA DNA SPEC QL NAA+PROBE: NOT DETECTED
NEUTROPHILS # BLD AUTO: 4.7 K/UL (ref 1.8–7.7)
NEUTROPHILS NFR BLD: 63.2 % (ref 38–73)
NONHDLC SERPL-MCNC: 134 MG/DL
NRBC BLD-RTO: 0 /100 WBC
PLATELET # BLD AUTO: 310 K/UL (ref 150–450)
PMV BLD AUTO: ABNORMAL FL (ref 9.2–12.9)
POTASSIUM SERPL-SCNC: 3.6 MMOL/L (ref 3.5–5.1)
PROT SERPL-MCNC: 7.4 G/DL (ref 6–8.4)
RBC # BLD AUTO: 4.19 M/UL (ref 4–5.4)
SODIUM SERPL-SCNC: 137 MMOL/L (ref 136–145)
T4 FREE SERPL-MCNC: 0.62 NG/DL (ref 0.71–1.51)
TRIGL SERPL-MCNC: 97 MG/DL (ref 30–150)
TSH SERPL DL<=0.005 MIU/L-ACNC: 22.14 UIU/ML (ref 0.4–4)
WBC # BLD AUTO: 7.41 K/UL (ref 3.9–12.7)

## 2023-08-18 PROCEDURE — 86803 HEPATITIS C AB TEST: CPT | Performed by: NURSE PRACTITIONER

## 2023-08-18 PROCEDURE — 84443 ASSAY THYROID STIM HORMONE: CPT | Performed by: NURSE PRACTITIONER

## 2023-08-18 PROCEDURE — 84439 ASSAY OF FREE THYROXINE: CPT | Performed by: NURSE PRACTITIONER

## 2023-08-18 PROCEDURE — 83036 HEMOGLOBIN GLYCOSYLATED A1C: CPT | Performed by: NURSE PRACTITIONER

## 2023-08-18 PROCEDURE — 80061 LIPID PANEL: CPT | Performed by: NURSE PRACTITIONER

## 2023-08-18 PROCEDURE — 87389 HIV-1 AG W/HIV-1&-2 AB AG IA: CPT | Performed by: NURSE PRACTITIONER

## 2023-08-18 PROCEDURE — 36415 COLL VENOUS BLD VENIPUNCTURE: CPT | Mod: PO | Performed by: NURSE PRACTITIONER

## 2023-08-18 PROCEDURE — 80053 COMPREHEN METABOLIC PANEL: CPT | Performed by: NURSE PRACTITIONER

## 2023-08-18 PROCEDURE — 86592 SYPHILIS TEST NON-TREP QUAL: CPT | Performed by: NURSE PRACTITIONER

## 2023-08-18 PROCEDURE — 85025 COMPLETE CBC W/AUTO DIFF WBC: CPT | Performed by: NURSE PRACTITIONER

## 2023-08-19 LAB
BACTERIA UR CULT: NORMAL
BACTERIA UR CULT: NORMAL
RPR SER QL: NORMAL

## 2023-08-21 DIAGNOSIS — D50.9 IRON DEFICIENCY ANEMIA, UNSPECIFIED IRON DEFICIENCY ANEMIA TYPE: Primary | ICD-10-CM

## 2023-08-22 ENCOUNTER — TELEPHONE (OUTPATIENT)
Dept: HEMATOLOGY/ONCOLOGY | Facility: CLINIC | Age: 48
End: 2023-08-22
Payer: MEDICAID

## 2023-08-22 ENCOUNTER — PATIENT MESSAGE (OUTPATIENT)
Dept: FAMILY MEDICINE | Facility: CLINIC | Age: 48
End: 2023-08-22
Payer: MEDICAID

## 2023-08-22 DIAGNOSIS — E03.9 ACQUIRED HYPOTHYROIDISM: ICD-10-CM

## 2023-08-22 RX ORDER — LEVOTHYROXINE SODIUM 200 UG/1
200 TABLET ORAL
Qty: 30 TABLET | Refills: 2 | Status: SHIPPED | OUTPATIENT
Start: 2023-08-22 | End: 2024-08-21

## 2023-08-22 NOTE — NURSING
Oncology Navigation   Intake  Cancer Type: Benign hem (Iron deficiency anemia)  Internal / External Referral: Internal (Michael Watson NP)  Date of Referral: 08/21/23  Initial Nurse Navigator Contact: 08/22/23  Referral to Initial Contact Timeline (days): 1  Date Worked: 08/22/23  First Appointment Available: 08/25/23  Appointment Date: 08/25/23 (Dr Guzman)  First Available Date vs. Scheduled Date (days): 0  Multiple appointments: No     Treatment                              Acuity      Follow Up  No follow-ups on file.

## 2023-08-25 ENCOUNTER — LAB VISIT (OUTPATIENT)
Dept: LAB | Facility: HOSPITAL | Age: 48
End: 2023-08-25
Attending: INTERNAL MEDICINE
Payer: MEDICAID

## 2023-08-25 ENCOUNTER — TELEPHONE (OUTPATIENT)
Dept: HEMATOLOGY/ONCOLOGY | Facility: CLINIC | Age: 48
End: 2023-08-25

## 2023-08-25 ENCOUNTER — OFFICE VISIT (OUTPATIENT)
Dept: HEMATOLOGY/ONCOLOGY | Facility: CLINIC | Age: 48
End: 2023-08-25
Payer: MEDICAID

## 2023-08-25 ENCOUNTER — TELEPHONE (OUTPATIENT)
Dept: HEMATOLOGY/ONCOLOGY | Facility: CLINIC | Age: 48
End: 2023-08-25
Payer: MEDICAID

## 2023-08-25 VITALS
RESPIRATION RATE: 12 BRPM | HEIGHT: 66 IN | SYSTOLIC BLOOD PRESSURE: 122 MMHG | WEIGHT: 227.5 LBS | OXYGEN SATURATION: 100 % | TEMPERATURE: 98 F | BODY MASS INDEX: 36.56 KG/M2 | DIASTOLIC BLOOD PRESSURE: 74 MMHG | HEART RATE: 91 BPM

## 2023-08-25 DIAGNOSIS — E11.40 TYPE 2 DIABETES MELLITUS WITH DIABETIC NEUROPATHY, WITHOUT LONG-TERM CURRENT USE OF INSULIN: ICD-10-CM

## 2023-08-25 DIAGNOSIS — D50.9 IRON DEFICIENCY ANEMIA, UNSPECIFIED IRON DEFICIENCY ANEMIA TYPE: ICD-10-CM

## 2023-08-25 DIAGNOSIS — I10 PRIMARY HYPERTENSION: ICD-10-CM

## 2023-08-25 DIAGNOSIS — E02 SUBCLINICAL IODINE-DEFICIENCY HYPOTHYROIDISM: Primary | ICD-10-CM

## 2023-08-25 LAB
ABO + RH BLD: NORMAL
ALBUMIN SERPL BCP-MCNC: 4.3 G/DL (ref 3.5–5.2)
ALP SERPL-CCNC: 71 U/L (ref 55–135)
ALT SERPL W/O P-5'-P-CCNC: 12 U/L (ref 10–44)
ANION GAP SERPL CALC-SCNC: 8 MMOL/L (ref 8–16)
AST SERPL-CCNC: 13 U/L (ref 10–40)
BASOPHILS # BLD AUTO: 0.06 K/UL (ref 0–0.2)
BASOPHILS NFR BLD: 0.7 % (ref 0–1.9)
BILIRUB SERPL-MCNC: 0.5 MG/DL (ref 0.1–1)
BLD GP AB SCN CELLS X3 SERPL QL: NORMAL
BUN SERPL-MCNC: 14 MG/DL (ref 6–20)
CALCIUM SERPL-MCNC: 8.7 MG/DL (ref 8.7–10.5)
CHLORIDE SERPL-SCNC: 103 MMOL/L (ref 95–110)
CO2 SERPL-SCNC: 26 MMOL/L (ref 23–29)
CREAT SERPL-MCNC: 0.7 MG/DL (ref 0.5–1.4)
DIFFERENTIAL METHOD: ABNORMAL
EOSINOPHIL # BLD AUTO: 0.1 K/UL (ref 0–0.5)
EOSINOPHIL NFR BLD: 1.5 % (ref 0–8)
ERYTHROCYTE [DISTWIDTH] IN BLOOD BY AUTOMATED COUNT: 21.4 % (ref 11.5–14.5)
EST. GFR  (NO RACE VARIABLE): >60 ML/MIN/1.73 M^2
FERRITIN SERPL-MCNC: 2 NG/ML (ref 20–300)
GLUCOSE SERPL-MCNC: 198 MG/DL (ref 70–110)
HCT VFR BLD AUTO: 24.6 % (ref 37–48.5)
HGB BLD-MCNC: 6.5 G/DL (ref 12–16)
IMM GRANULOCYTES # BLD AUTO: 0.03 K/UL (ref 0–0.04)
IMM GRANULOCYTES NFR BLD AUTO: 0.4 % (ref 0–0.5)
IRON SERPL-MCNC: 11 UG/DL (ref 30–160)
LYMPHOCYTES # BLD AUTO: 1.9 K/UL (ref 1–4.8)
LYMPHOCYTES NFR BLD: 22.8 % (ref 18–48)
MCH RBC QN AUTO: 15.3 PG (ref 27–31)
MCHC RBC AUTO-ENTMCNC: 26.4 G/DL (ref 32–36)
MCV RBC AUTO: 58 FL (ref 82–98)
MONOCYTES # BLD AUTO: 0.3 K/UL (ref 0.3–1)
MONOCYTES NFR BLD: 4 % (ref 4–15)
NEUTROPHILS # BLD AUTO: 6 K/UL (ref 1.8–7.7)
NEUTROPHILS NFR BLD: 70.6 % (ref 38–73)
NRBC BLD-RTO: 0 /100 WBC
PLATELET # BLD AUTO: 296 K/UL (ref 150–450)
PMV BLD AUTO: ABNORMAL FL (ref 9.2–12.9)
POTASSIUM SERPL-SCNC: 3.3 MMOL/L (ref 3.5–5.1)
PROT SERPL-MCNC: 8.1 G/DL (ref 6–8.4)
RBC # BLD AUTO: 4.26 M/UL (ref 4–5.4)
SATURATED IRON: 2 % (ref 20–50)
SODIUM SERPL-SCNC: 137 MMOL/L (ref 136–145)
SPECIMEN OUTDATE: NORMAL
TOTAL IRON BINDING CAPACITY: 517 UG/DL (ref 250–450)
TRANSFERRIN SERPL-MCNC: 369 MG/DL (ref 200–375)
WBC # BLD AUTO: 8.52 K/UL (ref 3.9–12.7)

## 2023-08-25 PROCEDURE — 99999 PR PBB SHADOW E&M-EST. PATIENT-LVL V: ICD-10-PCS | Mod: PBBFAC,,, | Performed by: INTERNAL MEDICINE

## 2023-08-25 PROCEDURE — 99999 PR PBB SHADOW E&M-EST. PATIENT-LVL V: CPT | Mod: PBBFAC,,, | Performed by: INTERNAL MEDICINE

## 2023-08-25 PROCEDURE — 3060F POS MICROALBUMINURIA REV: CPT | Mod: CPTII,,, | Performed by: INTERNAL MEDICINE

## 2023-08-25 PROCEDURE — 99215 OFFICE O/P EST HI 40 MIN: CPT | Mod: PBBFAC,PN | Performed by: INTERNAL MEDICINE

## 2023-08-25 PROCEDURE — 3074F SYST BP LT 130 MM HG: CPT | Mod: CPTII,,, | Performed by: INTERNAL MEDICINE

## 2023-08-25 PROCEDURE — 99214 OFFICE O/P EST MOD 30 MIN: CPT | Mod: S$PBB,,, | Performed by: INTERNAL MEDICINE

## 2023-08-25 PROCEDURE — 1159F MED LIST DOCD IN RCRD: CPT | Mod: CPTII,,, | Performed by: INTERNAL MEDICINE

## 2023-08-25 PROCEDURE — 36415 COLL VENOUS BLD VENIPUNCTURE: CPT | Performed by: INTERNAL MEDICINE

## 2023-08-25 PROCEDURE — 3078F PR MOST RECENT DIASTOLIC BLOOD PRESSURE < 80 MM HG: ICD-10-PCS | Mod: CPTII,,, | Performed by: INTERNAL MEDICINE

## 2023-08-25 PROCEDURE — 86922 COMPATIBILITY TEST ANTIGLOB: CPT | Performed by: INTERNAL MEDICINE

## 2023-08-25 PROCEDURE — 3060F PR POS MICROALBUMINURIA RESULT DOCUMENTED/REVIEW: ICD-10-PCS | Mod: CPTII,,, | Performed by: INTERNAL MEDICINE

## 2023-08-25 PROCEDURE — 3078F DIAST BP <80 MM HG: CPT | Mod: CPTII,,, | Performed by: INTERNAL MEDICINE

## 2023-08-25 PROCEDURE — 3046F PR MOST RECENT HEMOGLOBIN A1C LEVEL > 9.0%: ICD-10-PCS | Mod: CPTII,,, | Performed by: INTERNAL MEDICINE

## 2023-08-25 PROCEDURE — 3074F PR MOST RECENT SYSTOLIC BLOOD PRESSURE < 130 MM HG: ICD-10-PCS | Mod: CPTII,,, | Performed by: INTERNAL MEDICINE

## 2023-08-25 PROCEDURE — 85025 COMPLETE CBC W/AUTO DIFF WBC: CPT | Performed by: INTERNAL MEDICINE

## 2023-08-25 PROCEDURE — 1159F PR MEDICATION LIST DOCUMENTED IN MEDICAL RECORD: ICD-10-PCS | Mod: CPTII,,, | Performed by: INTERNAL MEDICINE

## 2023-08-25 PROCEDURE — 3066F PR DOCUMENTATION OF TREATMENT FOR NEPHROPATHY: ICD-10-PCS | Mod: CPTII,,, | Performed by: INTERNAL MEDICINE

## 2023-08-25 PROCEDURE — 3008F PR BODY MASS INDEX (BMI) DOCUMENTED: ICD-10-PCS | Mod: CPTII,,, | Performed by: INTERNAL MEDICINE

## 2023-08-25 PROCEDURE — 3046F HEMOGLOBIN A1C LEVEL >9.0%: CPT | Mod: CPTII,,, | Performed by: INTERNAL MEDICINE

## 2023-08-25 PROCEDURE — 84238 ASSAY NONENDOCRINE RECEPTOR: CPT | Performed by: INTERNAL MEDICINE

## 2023-08-25 PROCEDURE — 80053 COMPREHEN METABOLIC PANEL: CPT | Performed by: INTERNAL MEDICINE

## 2023-08-25 PROCEDURE — 99214 PR OFFICE/OUTPT VISIT, EST, LEVL IV, 30-39 MIN: ICD-10-PCS | Mod: S$PBB,,, | Performed by: INTERNAL MEDICINE

## 2023-08-25 PROCEDURE — 84466 ASSAY OF TRANSFERRIN: CPT | Performed by: INTERNAL MEDICINE

## 2023-08-25 PROCEDURE — 3066F NEPHROPATHY DOC TX: CPT | Mod: CPTII,,, | Performed by: INTERNAL MEDICINE

## 2023-08-25 PROCEDURE — 82728 ASSAY OF FERRITIN: CPT | Performed by: INTERNAL MEDICINE

## 2023-08-25 PROCEDURE — 86900 BLOOD TYPING SEROLOGIC ABO: CPT | Performed by: INTERNAL MEDICINE

## 2023-08-25 PROCEDURE — 3008F BODY MASS INDEX DOCD: CPT | Mod: CPTII,,, | Performed by: INTERNAL MEDICINE

## 2023-08-25 RX ORDER — ACETAMINOPHEN 325 MG/1
650 TABLET ORAL
Status: CANCELLED | OUTPATIENT
Start: 2023-08-25

## 2023-08-25 RX ORDER — DIPHENHYDRAMINE HCL 25 MG
25 CAPSULE ORAL
Status: CANCELLED | OUTPATIENT
Start: 2023-08-25

## 2023-08-25 RX ORDER — HYDROCODONE BITARTRATE AND ACETAMINOPHEN 500; 5 MG/1; MG/1
TABLET ORAL ONCE
Status: CANCELLED | OUTPATIENT
Start: 2023-08-25 | End: 2023-08-25

## 2023-08-25 NOTE — TELEPHONE ENCOUNTER
Called [facility] to verify spot for pt to receive 2 units PRBCs on Monday.    Spoke with [facility] scheduling. Pt to be called with time.

## 2023-08-25 NOTE — PROGRESS NOTES
Subjective:       Patient ID: Haydee Allen is a 48 y.o. female.    Chief Complaint: Anemia    HPI used to see Dr. Monson   reestablishing care  Was anemic years ago, used to take iron tabs but it made her constipated.   Menopausal now s/p GREG several years ago, used to have menorrhagis    Review of Systems    Patient denies issues related to appetite or recent weight change.  Feels well overall.  Denies issues with generalized weakness .  Denies fatigue over above what is normally experienced with day-to-day activities  Denies fever, chills, rigors  Denies issues with ambulation  Denies generalized swelling or new lumps and bumps felt in any part  of body  Denies visual or hearing loss  Denies issues with congestion, sinus issues, cough, sputum production runny nose or itching eyes  Denies chest pain or palpitations, or passing out  Denies abdominal pain, reflux symptoms, nausea vomiting loose stools or constipation  Denies seizure activity or focal weaknesses or symptoms related to TIA, no head aches or blurred vision reported  Denies issues with skin rash or bruising  Denies issues with swelling of feet, tingling or numbness   No issues with sleep,   No recent foreign travel   Good family support reported       Past Medical History:   Diagnosis Date    Abdominal pain     RIGHT MID ABD    Anemia     Anemia due to chronic blood loss 2020    Anxiety     Arthritis     Complex cyst of right ovary 2019    Diabetes mellitus     Diabetes mellitus, type 2     Encounter for blood transfusion     2    Encounter for well woman exam with routine gynecological exam 2019    Hypertension     Menorrhagia with regular cycle 2019    Menorrhagia with regular cycle 2020    Microcytic anemia 2020    Obesity     Ovarian cyst     right    Problem involving surgical incision 2019    Sinus congestion     Thyroid disease      Past Surgical History:   Procedure Laterality Date     SECTION       LAPAROSCOPIC SALPINGO-OOPHORECTOMY Bilateral 7/3/2019    Procedure: SALPINGO-OOPHORECTOMY, LAPAROSCOPIC;  Surgeon: Darin Hale MD;  [email or web address];  Service: OB/GYN;  Laterality: Bilateral;    LAPAROSCOPIC TOTAL HYSTERECTOMY N/A 7/3/2019    Procedure: HYSTERECTOMY, TOTAL, LAPAROSCOPIC;  Surgeon: Darin Hale MD;  Location: [email or web address] OR;  Service: OB/GYN;  Laterality: N/A;    OPEN REDUCTION AND INTERNAL FIXATION (ORIF) OF INJURY OF ANKLE Left     THYROIDECTOMY, PARTIAL      TUBAL LIGATION       Family History   Problem Relation Age of Onset    Cancer Mother     Hypertension Mother     Diabetes Mother     Arthritis Mother     Heart disease Mother     Kidney disease Mother     Cancer Father     Breast cancer Sister 58      Social History     Socioeconomic History    Marital status: Single   Tobacco Use    Smoking status: Never    Smokeless tobacco: Never   Substance and Sexual Activity    Alcohol use: No    Drug use: No    Sexual activity: Never     Social Determinants of Health     Financial Resource Strain: High Risk (5/26/2021)    Overall Financial Resource Strain (CARDIA)     Difficulty of Paying Living Expenses: Hard   Food Insecurity: Food Insecurity Present (5/26/2021)    Hunger Vital Sign     Worried About Running Out of Food in the Last Year: Often true     Ran Out of Food in the Last Year: Sometimes true   Transportation Needs: Unmet Transportation Needs (5/26/2021)    PRAPARE - Transportation     Lack of Transportation (Medical): Yes     Lack of Transportation (Non-Medical): Yes   Physical Activity: Unknown (5/26/2021)    Exercise Vital Sign     Days of Exercise per Week: 1 day   Stress: Stress Concern Present (5/26/2021)    Citizen of [place] of Occupational Health - Occupational Stress Questionnaire     Feeling of Stress : Very much   Social Connections: Unknown (5/26/2021)    Social Connection and Isolation Panel [NHANES]     Frequency of Communication with Friends and Family: Three times a  week     Frequency of Social Gatherings with Friends and Family: Once a week     Active Member of Clubs or Organizations: No     Attends Club or Organization Meetings: Never     Marital Status:      Review of patient's allergies indicates:   Allergen Reactions    Trulicity [dulaglutide] Rash       Current Outpatient Medications:     amLODIPine (NORVASC) 10 MG tablet, Take 1 tablet (10 mg total) by mouth once daily., Disp: 30 tablet, Rfl: 5    atorvastatin (LIPITOR) 10 MG tablet, Take 1 tablet (10 mg total) by mouth once daily., Disp: 30 tablet, Rfl: 5    blood sugar diagnostic (TRUE METRIX GLUCOSE TEST STRIP MISC), True Metrix Glucose Test Strip  Use twice daily as directed, Disp: , Rfl:     docusate sodium (COLACE) 100 MG capsule, Stool Softener 100 mg capsule, Disp: , Rfl:     flash glucose scanning reader (FREESTYLE TANGELA 14 DAY READER) Misc, Use with freestyle tangela to monitor glucose, Disp: 1 each, Rfl: 0    flash glucose sensor (FREESTYLE TANGELA 2 SENSOR) Kit, 1 Device by Misc.(Non-Drug; Combo Route) route every 14 (fourteen) days., Disp: 6 kit, Rfl: 0    hydroCHLOROthiazide (HYDRODIURIL) 12.5 MG Tab, Take 1 tablet (12.5 mg total) by mouth once daily., Disp: 90 tablet, Rfl: 1    insulin (LANTUS SOLOSTAR U-100 INSULIN) glargine 100 units/mL SubQ pen, Inject 15 Units into the skin every evening. Increase dose as directed until AM glucose at goal . Max daily dose 50 units. Last appointment over 1 year ago, follow-up needed to be able to approve additional refills. (Patient taking differently: Inject 18 Units into the skin every evening. Increase dose as directed until AM glucose at goal . Max daily dose 50 units. Last appointment over 1 year ago, follow-up needed to be able to approve additional refills.), Disp: 15 mL, Rfl: 5    levothyroxine (SYNTHROID) 200 MCG tablet, Take 1 tablet (200 mcg total) by mouth before breakfast. Take one extra tablet (400 mcg total) on Sundays., Disp: 30 tablet,  "Rfl: 2    linaCLOtide (LINZESS) 72 mcg Cap capsule, Take 72 mcg by mouth before breakfast., Disp: , Rfl:     lisinopriL (PRINIVIL,ZESTRIL) 40 MG tablet, Take 1 tablet (40 mg total) by mouth once daily., Disp: 90 tablet, Rfl: 1    metFORMIN (GLUCOPHAGE-XR) 500 MG ER 24hr tablet, Take 1 tablet (500 mg total) by mouth 2 (two) times daily with meals., Disp: 60 tablet, Rfl: 0    pen needle, diabetic (BD CINDY 2ND GEN PEN NEEDLE) 32 gauge x 5/32" Ndle, Inject 1 Box into the skin 2 (two) times a day., Disp: 100 each, Rfl: 2    butalbital-acetaminophen-caffeine -40 mg (FIORICET, ESGIC) -40 mg per tablet, Take 1 tablet by mouth every 4 (four) hours as needed., Disp: , Rfl:     celecoxib (CELEBREX) 200 MG capsule, Take 1 capsule (200 mg total) by mouth 2 (two) times daily as needed for Pain. Take with food. (Patient not taking: Reported on 8/25/2023), Disp: 60 capsule, Rfl: 3    clobetasoL (TEMOVATE) 0.05 % cream, Apply topically 2 (two) times daily. (Patient not taking: Reported on 8/17/2023), Disp: 15 g, Rfl: 0    clonazePAM (KLONOPIN) 0.5 MG tablet, Take 0.5 tablets (0.25 mg total) by mouth daily as needed for Anxiety. (Patient not taking: Reported on 8/25/2023), Disp: 30 tablet, Rfl: 0    doxycycline (VIBRAMYCIN) 100 MG Cap, Take 100 mg by mouth every 12 (twelve) hours., Disp: , Rfl:     EScitalopram oxalate (LEXAPRO) 20 MG tablet, Take 1 tablet (20 mg total) by mouth once daily. for 14 days (Patient not taking: Reported on 8/25/2023), Disp: 14 tablet, Rfl: 0    ferrous sulfate 325 (65 FE) MG EC tablet, Take 325 mg by mouth 3 (three) times daily with meals., Disp: , Rfl:     gabapentin (NEURONTIN) 300 MG capsule, Take 1 capsule (300 mg total) by mouth 3 (three) times daily. (Patient not taking: Reported on 8/25/2023), Disp: 90 capsule, Rfl: 11    insulin lispro (HUMALOG U-100 INSULIN) 100 unit/mL injection, Inject 20 Units into the skin 3 (three) times daily before meals. (Patient not taking: Reported on " 8/25/2023), Disp: 18 mL, Rfl: 1    liraglutide 0.6 mg/0.1 mL, 18 mg/3 mL, subq PNIJ (VICTOZA 2-YU) 0.6 mg/0.1 mL (18 mg/3 mL) PnIj pen, Inject 1.8 mg into the skin once daily. (Patient not taking: Reported on 8/25/2023), Disp: 9 mL, Rfl: 0    naproxen (NAPROSYN) 500 MG tablet, Take 500 mg by mouth 2 (two) times daily., Disp: , Rfl:     nystatin (MYCOSTATIN) ointment, Apply topically 2 (two) times daily. for 14 days (Patient not taking: Reported on 8/25/2023), Disp: 30 g, Rfl: 2    tiZANidine (ZANAFLEX) 4 MG tablet, Take 1 tablet (4 mg total) by mouth every 6 (six) hours as needed (muscle spasm). (Patient not taking: Reported on 8/17/2023), Disp: 20 tablet, Rfl: 0    traZODone (DESYREL) 50 MG tablet, Take 1 tablet (50 mg total) by mouth every evening. (Patient not taking: Reported on 8/25/2023), Disp: 30 tablet, Rfl: 11  No current facility-administered medications for this visit.    Facility-Administered Medications Ordered in Other Visits:     lidocaine (PF) 10 mg/ml (1%) injection 2 mg, 0.2 mL, Intradermal, Once, Brenda Hauser MD    Physical Exam    Wt Readings from Last 3 Encounters:   08/25/23 103.2 kg (227 lb 8.2 oz)   08/17/23 105.9 kg (233 lb 7.5 oz)   02/03/22 108.3 kg (238 lb 12.1 oz)     Temp Readings from Last 3 Encounters:   08/25/23 97.8 °F (36.6 °C) (Temporal)   08/17/23 98.6 °F (37 °C) (Oral)   02/03/22 98 °F (36.7 °C)     BP Readings from Last 3 Encounters:   08/25/23 122/74   08/17/23 (!) 170/90   02/03/22 (!) 130/98     Pulse Readings from Last 3 Encounters:   08/25/23 91   08/17/23 82   02/03/22 96      VITAL SIGNS:  as above   GENERAL: appears well-built, well-nourished.  No anxiety, no agitation, and in no distress.  Patient is awake, alert, oriented and cooperative.  HEENT:  Showed no congestion. Trachea is central no obvious icterus or pallor noted no hoarseness. no obvious JVD   NECK:  Supple.  No JVD. No obvious cervical submental or supraclavicular adenopathy.  RS:the visualized  portion of  Chest expands well. chest appears symmetric, no audible wheezes.  No dyspnea recognized  ABDOMEN:  abdomen appears undistended.  EXTREMITIES:  Without edema.  NEUROLOGICAL:  The patient is appropriate, higher functions are normal.  No  obvious neurological deficits.  normal judgement normal thought content  No confusion, no speech impediment. Cranial nerves are intact and show no deficit. No gross motor deficits noted   SKIN MUSCULOSKELETAL: no joint or skeletal deformity, no clubbing of nails.  No visible rash ecchymosis or petechiae   Lab Results   Component Value Date    WBC 7.41 08/18/2023    HGB 6.3 (L) 08/18/2023    HCT 24.7 (L) 08/18/2023    MCV 59 (L) 08/18/2023     08/18/2023       BMP  Lab Results   Component Value Date     08/18/2023    K 3.6 08/18/2023     08/18/2023    CO2 23 08/18/2023    BUN 10 08/18/2023    CREATININE 0.8 08/18/2023    CALCIUM 9.0 08/18/2023    ANIONGAP 13 08/18/2023    ESTGFRAFRICA >60.0 01/08/2021    EGFRNONAA >60.0 01/08/2021     Lab Results   Component Value Date    IRON 23 (L) 10/17/2020    TIBC 420 10/17/2020    FERRITIN 5 (L) 06/13/2020         Patient Active Problem List   Diagnosis    Chest pain on breathing    Hypertension    Type 2 diabetes mellitus with neurologic complication, without long-term current use of insulin    Class 2 severe obesity due to excess calories with serious comorbidity in adult    Diabetic polyneuropathy associated with type 2 diabetes mellitus    Meralgia paresthetica of right side    Anxiety    Obesity    Sciatica of right side    Gastroesophageal reflux disease without esophagitis    Hypothyroidism    Iron deficiency anemia    Pain localized to upper abdomen    Menopause syndrome    Mixed anxiety and depressive disorder    Right shoulder pain    Posture abnormality    Menorrhagia with regular cycle    Microcytic anemia    Anemia due to chronic blood loss    Adjustment disorder with depressed mood    Insomnia due to  other mental disorder        Assessment and Plan     Severe anemia: no iron levels done.hgb 6.3 8/18/23. 2 units prbc asap check iorn asap and replace as needed. Will need further w/u if not MERARI    DM : not well controled started back on insulin. Sees  Trever Rodriguez NP. Was out of meds and non compliant    HTN: takes her meds ,  recently started on them    Hypothyroidism. On synthyroid 200 mcg     Advance Care Planning     Date: 08/25/2023    Power of   I initiated the process of voluntary advance care planning today and explained the importance of this process to the patient.  I introduced the concept of advance directives to the patient, as well. Then the patient received detailed information about the importance of designating a Health Care Power of  (HCPOA). She was also instructed to communicate with this person about their wishes for future healthcare, should she become sick and lose decision-making capacity.

## 2023-08-25 NOTE — TELEPHONE ENCOUNTER
----- Message from Eliud Harding sent at 8/25/2023  8:59 AM CDT -----  Type: Need Medical Advice   Who Called: Patient  Best callback number: [phone]  Additional Information: Patient called and asked if she can come in early, patient is scheduled for 11am  Please call to further assist, Thanks.

## 2023-08-25 NOTE — TELEPHONE ENCOUNTER
"Per Tati Dangelo MT the following critical values were reported:  WBC 8.52, Hemoglobin 6.5, Hematocrit 24.6, & Platelets 296.Dr. Guzman was notified via secure chat & responded with "thanks".  "

## 2023-08-27 LAB — STFR SERPL-SCNC: 88.4 NMOL/L (ref 12.2–27.3)

## 2023-08-28 ENCOUNTER — TELEPHONE (OUTPATIENT)
Dept: HEMATOLOGY/ONCOLOGY | Facility: CLINIC | Age: 48
End: 2023-08-28
Payer: MEDICAID

## 2023-08-28 ENCOUNTER — INFUSION (OUTPATIENT)
Dept: INFUSION THERAPY | Facility: HOSPITAL | Age: 48
End: 2023-08-28
Attending: INTERNAL MEDICINE
Payer: MEDICAID

## 2023-08-28 VITALS
SYSTOLIC BLOOD PRESSURE: 129 MMHG | DIASTOLIC BLOOD PRESSURE: 77 MMHG | TEMPERATURE: 98 F | OXYGEN SATURATION: 98 % | HEART RATE: 74 BPM | RESPIRATION RATE: 14 BRPM

## 2023-08-28 DIAGNOSIS — D50.9 IRON DEFICIENCY ANEMIA, UNSPECIFIED IRON DEFICIENCY ANEMIA TYPE: ICD-10-CM

## 2023-08-28 LAB
BLD PROD TYP BPU: NORMAL
BLD PROD TYP BPU: NORMAL
BLOOD UNIT EXPIRATION DATE: NORMAL
BLOOD UNIT EXPIRATION DATE: NORMAL
BLOOD UNIT TYPE CODE: 6200
BLOOD UNIT TYPE CODE: 6200
BLOOD UNIT TYPE: NORMAL
BLOOD UNIT TYPE: NORMAL
CODING SYSTEM: NORMAL
CODING SYSTEM: NORMAL
CROSSMATCH INTERPRETATION: NORMAL
CROSSMATCH INTERPRETATION: NORMAL
DISPENSE STATUS: NORMAL
DISPENSE STATUS: NORMAL
NUM UNITS TRANS PACKED RBC: NORMAL
NUM UNITS TRANS PACKED RBC: NORMAL

## 2023-08-28 PROCEDURE — 36430 TRANSFUSION BLD/BLD COMPNT: CPT

## 2023-08-28 PROCEDURE — 25000003 PHARM REV CODE 250: Performed by: INTERNAL MEDICINE

## 2023-08-28 PROCEDURE — P9016 RBC LEUKOCYTES REDUCED: HCPCS | Performed by: INTERNAL MEDICINE

## 2023-08-28 RX ORDER — HEPARIN 100 UNIT/ML
500 SYRINGE INTRAVENOUS
OUTPATIENT
Start: 2023-08-28

## 2023-08-28 RX ORDER — SODIUM CHLORIDE 0.9 % (FLUSH) 0.9 %
10 SYRINGE (ML) INJECTION
OUTPATIENT
Start: 2023-08-28

## 2023-08-28 RX ORDER — HYDROCODONE BITARTRATE AND ACETAMINOPHEN 500; 5 MG/1; MG/1
TABLET ORAL ONCE
Status: COMPLETED | OUTPATIENT
Start: 2023-08-28 | End: 2023-08-28

## 2023-08-28 RX ORDER — DIPHENHYDRAMINE HYDROCHLORIDE 50 MG/ML
50 INJECTION INTRAMUSCULAR; INTRAVENOUS ONCE AS NEEDED
OUTPATIENT
Start: 2023-08-28

## 2023-08-28 RX ORDER — EPINEPHRINE 0.3 MG/.3ML
0.3 INJECTION SUBCUTANEOUS ONCE AS NEEDED
OUTPATIENT
Start: 2023-08-28

## 2023-08-28 RX ORDER — DIPHENHYDRAMINE HCL 25 MG
25 CAPSULE ORAL
Status: COMPLETED | OUTPATIENT
Start: 2023-08-28 | End: 2023-08-28

## 2023-08-28 RX ORDER — ACETAMINOPHEN 325 MG/1
650 TABLET ORAL
Status: COMPLETED | OUTPATIENT
Start: 2023-08-28 | End: 2023-08-28

## 2023-08-28 RX ADMIN — ACETAMINOPHEN 650 MG: 325 TABLET ORAL at 07:08

## 2023-08-28 RX ADMIN — DIPHENHYDRAMINE HYDROCHLORIDE 25 MG: 25 CAPSULE ORAL at 07:08

## 2023-08-28 RX ADMIN — SODIUM CHLORIDE: 0.9 INJECTION, SOLUTION INTRAVENOUS at 07:08

## 2023-08-28 NOTE — PROGRESS NOTES
1220  x2 units of PRBC's completed, pt tolerated well.  No changes in assessment upon completion.  1245  Discharged to home, ambulatory upon discharge.  Instructed to call md or go to ER for any cp/sob or any concerning symptoms, pt verbalized understanding.

## 2023-09-04 ENCOUNTER — HOSPITAL ENCOUNTER (EMERGENCY)
Facility: HOSPITAL | Age: 48
Discharge: HOME | End: 2023-09-04
Attending: EMERGENCY MEDICINE
Payer: MEDICAID

## 2023-09-04 VITALS
HEART RATE: 85 BPM | RESPIRATION RATE: 16 BRPM | BODY MASS INDEX: 36 KG/M2 | OXYGEN SATURATION: 100 % | TEMPERATURE: 98 F | WEIGHT: 224 LBS | SYSTOLIC BLOOD PRESSURE: 122 MMHG | HEIGHT: 66 IN | DIASTOLIC BLOOD PRESSURE: 83 MMHG

## 2023-09-04 DIAGNOSIS — S96.911A STRAIN OF RIGHT FOOT, INITIAL ENCOUNTER: Primary | ICD-10-CM

## 2023-09-04 DIAGNOSIS — M77.31 HEEL SPUR, RIGHT: ICD-10-CM

## 2023-09-04 DIAGNOSIS — R52 PAIN: ICD-10-CM

## 2023-09-04 PROCEDURE — 73630 XR FOOT COMPLETE 3 VIEW RIGHT: ICD-10-PCS | Mod: 26,RT,, | Performed by: RADIOLOGY

## 2023-09-04 PROCEDURE — 25000003 PHARM REV CODE 250: Performed by: NURSE PRACTITIONER

## 2023-09-04 PROCEDURE — 99284 EMERGENCY DEPT VISIT MOD MDM: CPT

## 2023-09-04 PROCEDURE — 73630 X-RAY EXAM OF FOOT: CPT | Mod: TC,RT

## 2023-09-04 PROCEDURE — 73630 X-RAY EXAM OF FOOT: CPT | Mod: 26,RT,, | Performed by: RADIOLOGY

## 2023-09-04 RX ORDER — BACLOFEN 10 MG/1
TABLET ORAL
Qty: 90 TABLET | Refills: 2 | Status: SHIPPED | OUTPATIENT
Start: 2023-09-04

## 2023-09-04 RX ORDER — DICLOFENAC SODIUM 75 MG/1
75 TABLET, DELAYED RELEASE ORAL 2 TIMES DAILY PRN
Qty: 30 TABLET | Refills: 2 | Status: SHIPPED | OUTPATIENT
Start: 2023-09-04

## 2023-09-04 RX ORDER — HYDROCODONE BITARTRATE AND ACETAMINOPHEN 7.5; 325 MG/1; MG/1
1 TABLET ORAL
Status: COMPLETED | OUTPATIENT
Start: 2023-09-04 | End: 2023-09-04

## 2023-09-04 RX ADMIN — HYDROCODONE BITARTRATE AND ACETAMINOPHEN 1 TABLET: 7.5; 325 TABLET ORAL at 11:09

## 2023-09-04 NOTE — Clinical Note
"Haydee Frankjolynn Allen was seen and treated in our emergency department on 9/4/2023.  She may return to work on 09/07/2023.       If you have any questions or concerns, please don't hesitate to call.      Misa Cai NP"

## 2023-09-04 NOTE — ED PROVIDER NOTES
"Encounter Date: 2023       History     Chief Complaint   Patient presents with    R foot pain      Pt reports R foot pain onset 2 days. States she stepped on a rock and "rolled" her ankle. Presents today with pain the lateral aspect of R foot      POV the ED with family.  Patient complains of foot pain onset 3 days ago.  States she twisted the foot.  Denies other complaints.  History of foot sprain 1 year ago.  Has to walk and stand for long periods of time on her job    The history is provided by the patient.     Review of patient's allergies indicates:   Allergen Reactions    Trulicity [dulaglutide] Rash     Past Medical History:   Diagnosis Date    Abdominal pain     RIGHT MID ABD    Anemia     Anemia due to chronic blood loss 2020    Anxiety     Arthritis     Complex cyst of right ovary 2019    Diabetes mellitus     Diabetes mellitus, type 2     Encounter for blood transfusion     2    Encounter for well woman exam with routine gynecological exam 2019    Hypertension     Menorrhagia with regular cycle 2019    Menorrhagia with regular cycle 2020    Microcytic anemia 2020    Obesity     Ovarian cyst     right    Problem involving surgical incision 2019    Sinus congestion     Thyroid disease      Past Surgical History:   Procedure Laterality Date     SECTION      LAPAROSCOPIC SALPINGO-OOPHORECTOMY Bilateral 7/3/2019    Procedure: SALPINGO-OOPHORECTOMY, LAPAROSCOPIC;  Surgeon: Darin Hale MD;  Location: [facility] OR;  Service: OB/GYN;  Laterality: Bilateral;    LAPAROSCOPIC TOTAL HYSTERECTOMY N/A 7/3/2019    Procedure: HYSTERECTOMY, TOTAL, LAPAROSCOPIC;  Surgeon: Darin Hale MD;  Location: [facility] OR;  Service: OB/GYN;  Laterality: N/A;    OPEN REDUCTION AND INTERNAL FIXATION (ORIF) OF INJURY OF ANKLE Left     THYROIDECTOMY, PARTIAL      TUBAL LIGATION       Family History   Problem Relation Age of Onset    Cancer Mother     Hypertension Mother     Diabetes Mother  "    Arthritis Mother     Heart disease Mother     Kidney disease Mother     Cancer Father     Breast cancer Sister 58     Social History     Tobacco Use    Smoking status: Never    Smokeless tobacco: Never   Substance Use Topics    Alcohol use: No    Drug use: No     Review of Systems   Constitutional:  Negative for diaphoresis and fever.   Musculoskeletal:         Right foot pain   All other systems reviewed and are negative.      Physical Exam     Initial Vitals [09/04/23 1043]   BP Pulse Resp Temp SpO2   122/83 85 16 98.1 °F (36.7 °C) 100 %      MAP       --         Physical Exam    Nursing note and vitals reviewed.  Constitutional: She appears well-developed and well-nourished. No distress.   HENT:   Head: Normocephalic and atraumatic.   Mouth/Throat: Oropharynx is clear and moist.   Eyes: Pupils are equal, round, and reactive to light.   Neck:   Normal range of motion.  Cardiovascular:  Normal rate and regular rhythm.           Pulmonary/Chest: Breath sounds normal. No respiratory distress.   Abdominal: Abdomen is soft. There is no abdominal tenderness.   Musculoskeletal:         General: Normal range of motion.      Cervical back: Normal range of motion.      Comments: Tenderness right lateral foot, soft tissue swelling.  No discoloration.  No redness or warmth     Neurological: She is alert and oriented to person, place, and time. She has normal strength. GCS score is 15. GCS eye subscore is 4. GCS verbal subscore is 5. GCS motor subscore is 6.   Skin: Skin is warm and dry. Capillary refill takes 2 to 3 seconds.   Psychiatric: She has a normal mood and affect. Thought content normal.         ED Course   Splint Application    Date/Time: 9/4/2023 12:28 PM    Performed by: Misa Cai NP  Authorized by: Marco Antonio Sorensen MD  Consent Done: Yes  Consent: Verbal consent obtained.  Consent given by: patient  Patient identity confirmed: name and verbally with patient  Location: right  foot.  Post-procedure: The splinted body part was neurovascularly unchanged following the procedure.  Patient tolerance: Patient tolerated the procedure well with no immediate complications  Comments: Hard sole walking shoe        Labs Reviewed - No data to display       Imaging Results              X-Ray Foot Complete Right (Final result)  Result time 09/04/23 11:17:54      Final result by David Telles MD (09/04/23 11:17:54)                   Narrative:    EXAMINATION:  XR FOOT COMPLETE 3 VIEW RIGHT    CLINICAL HISTORY:  . Pain, unspecified    TECHNIQUE:  AP, lateral, and oblique views of the right foot were performed.    COMPARISON:  None    FINDINGS:  No displaced fracture. Os peroneum, anatomic variant.  Calcaneal spurs. No malalignment.  Scattered mild mid and forefoot degenerative change.      Electronically signed by: David Telles  Date:    09/04/2023  Time:    11:17                                  X-Rays:   Independently Interpreted Readings:   Other Readings:  EXAMINATION:  XR FOOT COMPLETE 3 VIEW RIGHT     CLINICAL HISTORY:  . Pain, unspecified     TECHNIQUE:  AP, lateral, and oblique views of the right foot were performed.     COMPARISON:  None     FINDINGS:  No displaced fracture. Os peroneum, anatomic variant.  Calcaneal spurs. No malalignment.  Scattered mild mid and forefoot degenerative change.       Medications   HYDROcodone-acetaminophen 7.5-325 mg per tablet 1 tablet (1 tablet Oral Given 9/4/23 1102)     Medical Decision Making  Presents for evaluation of right foot pain.  X-ray ordered.  Disposition pending.  Differentials including but not limited to sprain, strain, contusion, fracture, dislocation, heel spur, arthritis  Plan of care:  Review of x-ray, no displaced fracture. Os peroneum, anatomic variant.  Calcaneal spurs. No malalignment.  Scattered mild mid and forefoot degenerative change.  Splint application in the ED. medicated for pain.  Prescriptions for home use.  Work note  given.  Patient will be discharged home referral information given.  She agrees with plan of care         Amount and/or Complexity of Data Reviewed  Radiology: ordered. Decision-making details documented in ED Course.    Risk  Prescription drug management.                               Clinical Impression:   Final diagnoses:  [R52] Pain  [S96.911A] Strain of right foot, initial encounter (Primary)  [M77.31] Heel spur, right        ED Disposition Condition    Discharge Stable          ED Prescriptions       Medication Sig Dispense Start Date End Date Auth. Provider    baclofen (LIORESAL) 10 MG tablet One PO TID PRN back pain 90 tablet 9/4/2023 -- Misa Cai NP    diclofenac (VOLTAREN) 75 MG EC tablet Take 1 tablet (75 mg total) by mouth 2 (two) times daily as needed (back pain). 30 tablet 9/4/2023 -- Misa Cai NP          Follow-up Information       Follow up With Specialties Details Why Contact Info    Balaji Gallagher MD Orthopedic Surgery Call in 3 days  [address] [facility] [place] [address] [phone]               Misa Cai NP  09/04/23 5867       Misa Cai NP  09/04/23 1237

## 2023-09-06 ENCOUNTER — TELEPHONE (OUTPATIENT)
Dept: ORTHOPEDICS | Facility: CLINIC | Age: 48
End: 2023-09-06
Payer: MEDICAID

## 2023-09-06 NOTE — TELEPHONE ENCOUNTER
Returned call. Patient requested a call back later.    ----- Message from Isela Moran MA sent at 9/6/2023  1:13 PM CDT -----  Contact: pt  Calling to schedule sprained foot appt   Call back  [phone]    Medicaid     
04-Nov-2021

## 2023-09-07 ENCOUNTER — TELEPHONE (OUTPATIENT)
Dept: ORTHOPEDICS | Facility: CLINIC | Age: 48
End: 2023-09-07
Payer: MEDICAID

## 2023-09-07 NOTE — TELEPHONE ENCOUNTER
Previously addressed.    ----- Message from Jorge Luis Lamar sent at 9/7/2023 12:58 PM CDT -----  Regarding: [facility] ED referral from 9/4, call pt for f/u, call pt   Contact: pt   [facility] ED referral from 9/4, call pt for f/u, call pt

## 2023-09-07 NOTE — TELEPHONE ENCOUNTER
Returned call. The patient was offered and agreeable to an appointment tomorrow 9/8/23 at 1:45 P.M. in [place] with Dr. Gallagher.      ----- Message -----  From: Almas Curtis  Sent: 9/6/2023   1:26 PM CDT  To: [facility] Staff    Type:  Sooner Appointment Request    Caller is requesting a sooner appointment.  Caller declined first available appointment listed below.  Caller will not accept being placed on the waitlist and is requesting a message be sent to doctor.    Name of Caller:  pt  When is the first available appointment?  N/a  Symptoms:  ER referral sprain  Best Call Back Number:  [phone]   Additional Information:  Thanks

## 2023-09-08 ENCOUNTER — OFFICE VISIT (OUTPATIENT)
Dept: ORTHOPEDICS | Facility: CLINIC | Age: 48
End: 2023-09-08
Payer: MEDICAID

## 2023-09-08 DIAGNOSIS — S93.401A SPRAIN OF RIGHT ANKLE, UNSPECIFIED LIGAMENT, INITIAL ENCOUNTER: Primary | ICD-10-CM

## 2023-09-08 PROCEDURE — 3046F PR MOST RECENT HEMOGLOBIN A1C LEVEL > 9.0%: ICD-10-PCS | Mod: CPTII,,, | Performed by: ORTHOPAEDIC SURGERY

## 2023-09-08 PROCEDURE — 99203 PR OFFICE/OUTPT VISIT, NEW, LEVL III, 30-44 MIN: ICD-10-PCS | Mod: S$PBB,,, | Performed by: ORTHOPAEDIC SURGERY

## 2023-09-08 PROCEDURE — 3060F POS MICROALBUMINURIA REV: CPT | Mod: CPTII,,, | Performed by: ORTHOPAEDIC SURGERY

## 2023-09-08 PROCEDURE — 1160F PR REVIEW ALL MEDS BY PRESCRIBER/CLIN PHARMACIST DOCUMENTED: ICD-10-PCS | Mod: CPTII,,, | Performed by: ORTHOPAEDIC SURGERY

## 2023-09-08 PROCEDURE — 3060F PR POS MICROALBUMINURIA RESULT DOCUMENTED/REVIEW: ICD-10-PCS | Mod: CPTII,,, | Performed by: ORTHOPAEDIC SURGERY

## 2023-09-08 PROCEDURE — 99203 OFFICE O/P NEW LOW 30 MIN: CPT | Mod: S$PBB,,, | Performed by: ORTHOPAEDIC SURGERY

## 2023-09-08 PROCEDURE — 3066F PR DOCUMENTATION OF TREATMENT FOR NEPHROPATHY: ICD-10-PCS | Mod: CPTII,,, | Performed by: ORTHOPAEDIC SURGERY

## 2023-09-08 PROCEDURE — 1159F PR MEDICATION LIST DOCUMENTED IN MEDICAL RECORD: ICD-10-PCS | Mod: CPTII,,, | Performed by: ORTHOPAEDIC SURGERY

## 2023-09-08 PROCEDURE — 99999 PR PBB SHADOW E&M-EST. PATIENT-LVL III: ICD-10-PCS | Mod: PBBFAC,,, | Performed by: ORTHOPAEDIC SURGERY

## 2023-09-08 PROCEDURE — 3046F HEMOGLOBIN A1C LEVEL >9.0%: CPT | Mod: CPTII,,, | Performed by: ORTHOPAEDIC SURGERY

## 2023-09-08 PROCEDURE — 1159F MED LIST DOCD IN RCRD: CPT | Mod: CPTII,,, | Performed by: ORTHOPAEDIC SURGERY

## 2023-09-08 PROCEDURE — 3066F NEPHROPATHY DOC TX: CPT | Mod: CPTII,,, | Performed by: ORTHOPAEDIC SURGERY

## 2023-09-08 PROCEDURE — 1160F RVW MEDS BY RX/DR IN RCRD: CPT | Mod: CPTII,,, | Performed by: ORTHOPAEDIC SURGERY

## 2023-09-08 PROCEDURE — 99999 PR PBB SHADOW E&M-EST. PATIENT-LVL III: CPT | Mod: PBBFAC,,, | Performed by: ORTHOPAEDIC SURGERY

## 2023-09-08 PROCEDURE — 99213 OFFICE O/P EST LOW 20 MIN: CPT | Mod: PBBFAC,PO | Performed by: ORTHOPAEDIC SURGERY

## 2023-09-08 NOTE — PROGRESS NOTES
Subjective:      Patient ID: Haydee Allen is a 48 y.o. female.    Chief Complaint: Injury and Pain of the Right Foot    HPI  48-year-old female with recurrent injury to her right ankle when she stepped awkwardly approximate 5-6 days ago.  She has a previous history of ankle sprain.  She was seen in the emergency department and referred for further evaluation.  She describing general pain swelling.  She is on her feet quite a bit during the day at her job at [employer].  ROS      Objective:    Ortho Exam     Constitutional:   Patient is alert  and oriented in no acute distress  HEENT:  normocephalic atraumatic; PERRL EOMI  Neck:  Supple without adenopathy  Cardiovascular:  Normal rate and rhythm  Pulmonary:  Normal respiratory effort normal chest wall expansion  Abdominal:  Nonprotuberant nondistended  Musculoskeletal:  Patient has a diffuse lateral ankle swelling and tenderness  She comes in tendon issues today with a mildly antalgic gait no significant medial or proximal tenderness  She has some guarding with instability testing.  Neurological:  No focal defect; cranial nerves 2-12 grossly intact  Psychiatric/behavioral:  Mood and behavior normal      X-Ray Foot Complete Right  EXAMINATION:  XR FOOT COMPLETE 3 VIEW RIGHT    CLINICAL HISTORY:  . Pain, unspecified    TECHNIQUE:  AP, lateral, and oblique views of the right foot were performed.    COMPARISON:  None    FINDINGS:  No displaced fracture. Os peroneum, anatomic variant.  Calcaneal spurs. No malalignment.  Scattered mild mid and forefoot degenerative change.    Electronically signed by: David Telles  Date:    09/04/2023  Time:    11:17       My Radiographs Findings:    I have personally reviewed radiographs and concur with above findings    Assessment:       Encounter Diagnosis   Name Primary?    Sprain of right ankle, unspecified ligament, initial encounter Yes         Plan:       I have discussed medical condition treatment options with her at  length.  We will get her some ankle sprain rehab exercises into a walking boot we have discussed picking up her medications prescribed from the emergency department.  She states that she did not have adequate transportation to get that yet.  She can slowly advance activities as tolerated wean from her boot as tolerated follow up with me in 3-4 weeks for any residual symptoms otherwise as needed.        Past Medical History:   Diagnosis Date    Abdominal pain     RIGHT MID ABD    Anemia     Anemia due to chronic blood loss 2020    Anxiety     Arthritis     Complex cyst of right ovary 2019    Diabetes mellitus     Diabetes mellitus, type 2     Encounter for blood transfusion     2    Encounter for well woman exam with routine gynecological exam 2019    Hypertension     Menorrhagia with regular cycle 2019    Menorrhagia with regular cycle 2020    Microcytic anemia 2020    Obesity     Ovarian cyst     right    Problem involving surgical incision 2019    Sinus congestion     Thyroid disease      Past Surgical History:   Procedure Laterality Date     SECTION      LAPAROSCOPIC SALPINGO-OOPHORECTOMY Bilateral 7/3/2019    Procedure: SALPINGO-OOPHORECTOMY, LAPAROSCOPIC;  Surgeon: Darin Hale MD;  Location: [facility] OR;  Service: OB/GYN;  Laterality: Bilateral;    LAPAROSCOPIC TOTAL HYSTERECTOMY N/A 7/3/2019    Procedure: HYSTERECTOMY, TOTAL, LAPAROSCOPIC;  Surgeon: Darin Hale MD;  Location: [facility] OR;  Service: OB/GYN;  Laterality: N/A;    OPEN REDUCTION AND INTERNAL FIXATION (ORIF) OF INJURY OF ANKLE Left     THYROIDECTOMY, PARTIAL      TUBAL LIGATION           Current Outpatient Medications:     amLODIPine (NORVASC) 10 MG tablet, Take 1 tablet (10 mg total) by mouth once daily., Disp: 30 tablet, Rfl: 5    atorvastatin (LIPITOR) 10 MG tablet, Take 1 tablet (10 mg total) by mouth once daily., Disp: 30 tablet, Rfl: 5    baclofen (LIORESAL) 10 MG tablet, One PO TID PRN back  "pain, Disp: 90 tablet, Rfl: 2    blood sugar diagnostic (TRUE METRIX GLUCOSE TEST STRIP MISC), True Metrix Glucose Test Strip  Use twice daily as directed, Disp: , Rfl:     butalbital-acetaminophen-caffeine -40 mg (FIORICET, ESGIC) -40 mg per tablet, Take 1 tablet by mouth every 4 (four) hours as needed., Disp: , Rfl:     diclofenac (VOLTAREN) 75 MG EC tablet, Take 1 tablet (75 mg total) by mouth 2 (two) times daily as needed (back pain)., Disp: 30 tablet, Rfl: 2    docusate sodium (COLACE) 100 MG capsule, Stool Softener 100 mg capsule, Disp: , Rfl:     doxycycline (VIBRAMYCIN) 100 MG Cap, Take 100 mg by mouth every 12 (twelve) hours., Disp: , Rfl:     ferrous sulfate 325 (65 FE) MG EC tablet, Take 325 mg by mouth 3 (three) times daily with meals., Disp: , Rfl:     flash glucose scanning reader (FREESTYLE TANGELA 14 DAY READER) Misc, Use with freestyle tangela to monitor glucose, Disp: 1 each, Rfl: 0    flash glucose sensor (FREESTYLE TANGELA 2 SENSOR) Kit, 1 Device by Misc.(Non-Drug; Combo Route) route every 14 (fourteen) days., Disp: 6 kit, Rfl: 0    levothyroxine (SYNTHROID) 200 MCG tablet, Take 1 tablet (200 mcg total) by mouth before breakfast. Take one extra tablet (400 mcg total) on Sundays., Disp: 30 tablet, Rfl: 2    linaCLOtide (LINZESS) 72 mcg Cap capsule, Take 72 mcg by mouth before breakfast., Disp: , Rfl:     lisinopriL (PRINIVIL,ZESTRIL) 40 MG tablet, Take 1 tablet (40 mg total) by mouth once daily., Disp: 90 tablet, Rfl: 1    metFORMIN (GLUCOPHAGE-XR) 500 MG ER 24hr tablet, Take 1 tablet (500 mg total) by mouth 2 (two) times daily with meals., Disp: 60 tablet, Rfl: 0    naproxen (NAPROSYN) 500 MG tablet, Take 500 mg by mouth 2 (two) times daily., Disp: , Rfl:     pen needle, diabetic (BD CINDY 2ND GEN PEN NEEDLE) 32 gauge x 5/32" Ndle, Inject 1 Box into the skin 2 (two) times a day., Disp: 100 each, Rfl: 2    hydroCHLOROthiazide (HYDRODIURIL) 12.5 MG Tab, Take 1 tablet (12.5 mg total) by mouth " once daily., Disp: 90 tablet, Rfl: 1  No current facility-administered medications for this visit.    Facility-Administered Medications Ordered in Other Visits:     lidocaine (PF) 10 mg/ml (1%) injection 2 mg, 0.2 mL, Intradermal, Once, Brenda Hauser MD    Review of patient's allergies indicates:   Allergen Reactions    Trulicity [dulaglutide] Rash       Family History   Problem Relation Age of Onset    Cancer Mother     Hypertension Mother     Diabetes Mother     Arthritis Mother     Heart disease Mother     Kidney disease Mother     Cancer Father     Breast cancer Sister 58     Social History     Occupational History    Not on file   Tobacco Use    Smoking status: Never    Smokeless tobacco: Never   Substance and Sexual Activity    Alcohol use: No    Drug use: No    Sexual activity: Never

## 2023-09-15 ENCOUNTER — TELEPHONE (OUTPATIENT)
Dept: ORTHOPEDICS | Facility: CLINIC | Age: 48
End: 2023-09-15
Payer: MEDICAID

## 2023-09-15 NOTE — TELEPHONE ENCOUNTER
Returned call. I stated I had forwarded her message to Dr. Gallagher, but he is gone for the day. I stated with the pain she is experiencing she should proceed to the nearest urgent care or emergency room. The patient stated understanding.    ----- Message from Huber Kurtz MA sent at 9/15/2023  4:21 PM CDT -----  Contact: patient  Patient is requesting pain medication as the OTC is just not working.      [address] [address]  Phone: [phone] Fax: [phone]    Call back number is [phone]

## 2023-10-04 ENCOUNTER — TELEPHONE (OUTPATIENT)
Dept: INFUSION THERAPY | Facility: HOSPITAL | Age: 48
End: 2023-10-04

## 2023-10-04 ENCOUNTER — TELEPHONE (OUTPATIENT)
Dept: HEMATOLOGY/ONCOLOGY | Facility: CLINIC | Age: 48
End: 2023-10-04
Payer: MEDICAID

## 2023-10-04 DIAGNOSIS — D50.9 IRON DEFICIENCY ANEMIA, UNSPECIFIED IRON DEFICIENCY ANEMIA TYPE: Primary | ICD-10-CM

## 2023-10-04 NOTE — TELEPHONE ENCOUNTER
Spoke with patient regarding her ferrlecit appointment for tomorrow since she has missed the first 3 and was told she broke her foot and unable to walk at this time.having to stay in bed and doesn't see the doctor until Friday. I informed the patient we will cancel her appt for tomorrow.

## 2023-10-12 ENCOUNTER — TELEPHONE (OUTPATIENT)
Dept: INFUSION THERAPY | Facility: HOSPITAL | Age: 48
End: 2023-10-12

## 2023-10-12 NOTE — TELEPHONE ENCOUNTER
Patient no-showed for her Venofer 5/8 appt. This will be the fifth time patient has not come to infusion for her infuison. Scheduling notified.

## 2023-10-13 DIAGNOSIS — S93.401A SPRAIN OF RIGHT ANKLE, UNSPECIFIED LIGAMENT, INITIAL ENCOUNTER: Primary | ICD-10-CM

## 2023-10-13 RX ORDER — CELECOXIB 100 MG/1
100 CAPSULE ORAL 2 TIMES DAILY
Qty: 60 CAPSULE | Refills: 1 | Status: SHIPPED | OUTPATIENT
Start: 2023-10-13 | End: 2023-10-27 | Stop reason: SDUPTHER

## 2023-10-13 NOTE — TELEPHONE ENCOUNTER
Returned call. The patient was offered an appointment on 10/18/23 and and 10/20/23 which was declined due to working. She was offered and agreeable to an appointment on 10/27/23 at 1:00 p.m. in [place] with Dr. Gallagher. She requested medication to help with her pain. I stated I would send a message to Dr. Gallagher to see what he wanted to send in for her. The patient stated understanding.    A message was sent to Dr. Gallagher via Epic chat. He stated to send Celebrex 100mg BID to the patient's pharmacy. An order was entered for Dr. Gallagher to sign.    ----- Message from Jorge Luis Lamar sent at 10/13/2023 12:02 PM CDT -----  Regarding: medicaid pt wants to re [facility], [phone]  Contact: pt   medicaid pt wants to re [facility], [phone]

## 2023-10-27 ENCOUNTER — OFFICE VISIT (OUTPATIENT)
Dept: ORTHOPEDICS | Facility: CLINIC | Age: 48
End: 2023-10-27
Payer: MEDICAID

## 2023-10-27 VITALS — HEIGHT: 66 IN | BODY MASS INDEX: 36 KG/M2 | WEIGHT: 224 LBS | RESPIRATION RATE: 18 BRPM

## 2023-10-27 DIAGNOSIS — S93.401A SPRAIN OF RIGHT ANKLE, UNSPECIFIED LIGAMENT, INITIAL ENCOUNTER: ICD-10-CM

## 2023-10-27 PROCEDURE — 3066F PR DOCUMENTATION OF TREATMENT FOR NEPHROPATHY: ICD-10-PCS | Mod: CPTII,,, | Performed by: ORTHOPAEDIC SURGERY

## 2023-10-27 PROCEDURE — 99999 PR PBB SHADOW E&M-EST. PATIENT-LVL III: ICD-10-PCS | Mod: PBBFAC,,, | Performed by: ORTHOPAEDIC SURGERY

## 2023-10-27 PROCEDURE — 99214 OFFICE O/P EST MOD 30 MIN: CPT | Mod: S$PBB,,, | Performed by: ORTHOPAEDIC SURGERY

## 2023-10-27 PROCEDURE — 3008F PR BODY MASS INDEX (BMI) DOCUMENTED: ICD-10-PCS | Mod: CPTII,,, | Performed by: ORTHOPAEDIC SURGERY

## 2023-10-27 PROCEDURE — 3008F BODY MASS INDEX DOCD: CPT | Mod: CPTII,,, | Performed by: ORTHOPAEDIC SURGERY

## 2023-10-27 PROCEDURE — 3046F HEMOGLOBIN A1C LEVEL >9.0%: CPT | Mod: CPTII,,, | Performed by: ORTHOPAEDIC SURGERY

## 2023-10-27 PROCEDURE — 1159F MED LIST DOCD IN RCRD: CPT | Mod: CPTII,,, | Performed by: ORTHOPAEDIC SURGERY

## 2023-10-27 PROCEDURE — 1160F RVW MEDS BY RX/DR IN RCRD: CPT | Mod: CPTII,,, | Performed by: ORTHOPAEDIC SURGERY

## 2023-10-27 PROCEDURE — 3066F NEPHROPATHY DOC TX: CPT | Mod: CPTII,,, | Performed by: ORTHOPAEDIC SURGERY

## 2023-10-27 PROCEDURE — 99999 PR PBB SHADOW E&M-EST. PATIENT-LVL III: CPT | Mod: PBBFAC,,, | Performed by: ORTHOPAEDIC SURGERY

## 2023-10-27 PROCEDURE — 3046F PR MOST RECENT HEMOGLOBIN A1C LEVEL > 9.0%: ICD-10-PCS | Mod: CPTII,,, | Performed by: ORTHOPAEDIC SURGERY

## 2023-10-27 PROCEDURE — 3060F PR POS MICROALBUMINURIA RESULT DOCUMENTED/REVIEW: ICD-10-PCS | Mod: CPTII,,, | Performed by: ORTHOPAEDIC SURGERY

## 2023-10-27 PROCEDURE — 3060F POS MICROALBUMINURIA REV: CPT | Mod: CPTII,,, | Performed by: ORTHOPAEDIC SURGERY

## 2023-10-27 PROCEDURE — 99214 PR OFFICE/OUTPT VISIT, EST, LEVL IV, 30-39 MIN: ICD-10-PCS | Mod: S$PBB,,, | Performed by: ORTHOPAEDIC SURGERY

## 2023-10-27 PROCEDURE — 1160F PR REVIEW ALL MEDS BY PRESCRIBER/CLIN PHARMACIST DOCUMENTED: ICD-10-PCS | Mod: CPTII,,, | Performed by: ORTHOPAEDIC SURGERY

## 2023-10-27 PROCEDURE — 99213 OFFICE O/P EST LOW 20 MIN: CPT | Mod: PBBFAC,PO | Performed by: ORTHOPAEDIC SURGERY

## 2023-10-27 PROCEDURE — 1159F PR MEDICATION LIST DOCUMENTED IN MEDICAL RECORD: ICD-10-PCS | Mod: CPTII,,, | Performed by: ORTHOPAEDIC SURGERY

## 2023-10-27 RX ORDER — CELECOXIB 100 MG/1
100 CAPSULE ORAL 2 TIMES DAILY
Qty: 60 CAPSULE | Refills: 1 | Status: SHIPPED | OUTPATIENT
Start: 2023-10-27 | End: 2023-12-26

## 2023-10-27 NOTE — PROGRESS NOTES
Subjective:      Patient ID: Haydee Allen is a 48 y.o. female.    Chief Complaint: Pain, Tingling, Swelling, and Injury of the Right Foot    HPI  Patient follow up on her right foot.  She she continues to playing complain of the pain and swelling that she feels is worsening over the last week or so  ROS      Objective:    Ortho Exam     Constitutional:   Patient is alert  and oriented in no acute distress  HEENT:  normocephalic atraumatic; PERRL EOMI  Neck:  Supple without adenopathy  Cardiovascular:  Normal rate and rhythm  Pulmonary:  Normal respiratory effort normal chest wall expansion  Abdominal:  Nonprotuberant nondistended  Musculoskeletal:  Patient continues to have diffuse lateral forefoot tenderness  Neurological:  No focal defect; cranial nerves 2-12 grossly intact  Psychiatric/behavioral:  Mood and behavior normal      X-Ray Foot Complete Right  EXAMINATION:  XR FOOT COMPLETE 3 VIEW RIGHT    CLINICAL HISTORY:  . Pain, unspecified    TECHNIQUE:  AP, lateral, and oblique views of the right foot were performed.    COMPARISON:  None    FINDINGS:  No displaced fracture. Os peroneum, anatomic variant.  Calcaneal spurs. No malalignment.  Scattered mild mid and forefoot degenerative change.    Electronically signed by: David Telles  Date:    09/04/2023  Time:    11:17       My Radiographs Findings:    I have personally reviewed radiographs and concur with above findings    Assessment:       Encounter Diagnosis   Name Primary?    Sprain of right ankle, unspecified ligament, initial encounter          Plan:       I have discussed medical condition treatment options her at length.  I have suggested an MRI of her foot secondary to ongoing pain possibility of occult ligament or osseous injury.  I will give her a trial of Celebrex.  Follow up after MRI sooner if any questions or problems        Past Medical History:   Diagnosis Date    Abdominal pain     RIGHT MID ABD    Anemia     Anemia due to chronic  blood loss 2020    Anxiety     Arthritis     Complex cyst of right ovary 2019    Diabetes mellitus     Diabetes mellitus, type 2     Encounter for blood transfusion     2    Encounter for well woman exam with routine gynecological exam 2019    Hypertension     Menorrhagia with regular cycle 2019    Menorrhagia with regular cycle 2020    Microcytic anemia 2020    Obesity     Ovarian cyst     right    Problem involving surgical incision 2019    Sinus congestion     Thyroid disease      Past Surgical History:   Procedure Laterality Date     SECTION      LAPAROSCOPIC SALPINGO-OOPHORECTOMY Bilateral 7/3/2019    Procedure: SALPINGO-OOPHORECTOMY, LAPAROSCOPIC;  Surgeon: Darin Hale MD;  Location: [facility] OR;  Service: OB/GYN;  Laterality: Bilateral;    LAPAROSCOPIC TOTAL HYSTERECTOMY N/A 7/3/2019    Procedure: HYSTERECTOMY, TOTAL, LAPAROSCOPIC;  Surgeon: Darin Hale MD;  Location: [facility] OR;  Service: OB/GYN;  Laterality: N/A;    OPEN REDUCTION AND INTERNAL FIXATION (ORIF) OF INJURY OF ANKLE Left     THYROIDECTOMY, PARTIAL      TUBAL LIGATION           Current Outpatient Medications:     amLODIPine (NORVASC) 10 MG tablet, Take 1 tablet (10 mg total) by mouth once daily., Disp: 30 tablet, Rfl: 5    atorvastatin (LIPITOR) 10 MG tablet, Take 1 tablet (10 mg total) by mouth once daily., Disp: 30 tablet, Rfl: 5    baclofen (LIORESAL) 10 MG tablet, One PO TID PRN back pain, Disp: 90 tablet, Rfl: 2    blood sugar diagnostic (TRUE METRIX GLUCOSE TEST STRIP MISC), True Metrix Glucose Test Strip  Use twice daily as directed, Disp: , Rfl:     butalbital-acetaminophen-caffeine -40 mg (FIORICET, ESGIC) -40 mg per tablet, Take 1 tablet by mouth every 4 (four) hours as needed., Disp: , Rfl:     celecoxib (CELEBREX) 100 MG capsule, Take 1 capsule (100 mg total) by mouth 2 (two) times daily., Disp: 60 capsule, Rfl: 1    diclofenac (VOLTAREN) 75 MG EC tablet, Take 1 tablet (75 mg  "total) by mouth 2 (two) times daily as needed (back pain)., Disp: 30 tablet, Rfl: 2    docusate sodium (COLACE) 100 MG capsule, Stool Softener 100 mg capsule, Disp: , Rfl:     doxycycline (VIBRAMYCIN) 100 MG Cap, Take 100 mg by mouth every 12 (twelve) hours., Disp: , Rfl:     ferrous sulfate 325 (65 FE) MG EC tablet, Take 325 mg by mouth 3 (three) times daily with meals., Disp: , Rfl:     flash glucose scanning reader (FREESTYLE TANGELA 14 DAY READER) Misc, Use with freestyle tangela to monitor glucose, Disp: 1 each, Rfl: 0    flash glucose sensor (FREESTYLE TANGELA 2 SENSOR) Kit, 1 Device by Misc.(Non-Drug; Combo Route) route every 14 (fourteen) days., Disp: 6 kit, Rfl: 0    hydroCHLOROthiazide (HYDRODIURIL) 12.5 MG Tab, Take 1 tablet (12.5 mg total) by mouth once daily., Disp: 90 tablet, Rfl: 1    levothyroxine (SYNTHROID) 200 MCG tablet, Take 1 tablet (200 mcg total) by mouth before breakfast. Take one extra tablet (400 mcg total) on Sundays., Disp: 30 tablet, Rfl: 2    linaCLOtide (LINZESS) 72 mcg Cap capsule, Take 72 mcg by mouth before breakfast., Disp: , Rfl:     lisinopriL (PRINIVIL,ZESTRIL) 40 MG tablet, Take 1 tablet (40 mg total) by mouth once daily., Disp: 90 tablet, Rfl: 1    metFORMIN (GLUCOPHAGE-XR) 500 MG ER 24hr tablet, Take 1 tablet (500 mg total) by mouth 2 (two) times daily with meals., Disp: 60 tablet, Rfl: 0    naproxen (NAPROSYN) 500 MG tablet, Take 500 mg by mouth 2 (two) times daily., Disp: , Rfl:     pen needle, diabetic (BD CINDY 2ND GEN PEN NEEDLE) 32 gauge x 5/32" Ndle, Inject 1 Box into the skin 2 (two) times a day., Disp: 100 each, Rfl: 2  No current facility-administered medications for this visit.    Facility-Administered Medications Ordered in Other Visits:     lidocaine (PF) 10 mg/ml (1%) injection 2 mg, 0.2 mL, Intradermal, Once, Brenda Hauser MD    Review of patient's allergies indicates:   Allergen Reactions    Trulicity [dulaglutide] Rash       Family History   Problem Relation " Age of Onset    Cancer Mother     Hypertension Mother     Diabetes Mother     Arthritis Mother     Heart disease Mother     Kidney disease Mother     Cancer Father     Breast cancer Sister 58     Social History     Occupational History    Not on file   Tobacco Use    Smoking status: Never    Smokeless tobacco: Never   Substance and Sexual Activity    Alcohol use: No    Drug use: No    Sexual activity: Never

## 2023-10-30 ENCOUNTER — TELEPHONE (OUTPATIENT)
Dept: ORTHOPEDICS | Facility: CLINIC | Age: 48
End: 2023-10-30
Payer: MEDICAID

## 2023-10-30 NOTE — TELEPHONE ENCOUNTER
Returned call. I stated per Dr. Gallagher that the patient should contact the patient's primary care provider for something to take prior to the MRI. The patient stated understanding. I stated that I had sent a message about another medication for her pain and I would enter the order as sooner as Dr. Gallagher lets us know what medication to send in. The patient stated understanding.    ----- Message from Jorge Luis Lamar sent at 10/30/2023  3:41 PM CDT -----  Regarding: needs pain medication that medicaid will pay for and medication for MRI, call pt   Contact: pt   needs pain medication that medicaid will pay for and medication for MRI, call pt

## 2023-11-06 ENCOUNTER — HOSPITAL ENCOUNTER (OUTPATIENT)
Dept: RADIOLOGY | Facility: HOSPITAL | Age: 48
Discharge: HOME | End: 2023-11-06
Attending: ORTHOPAEDIC SURGERY
Payer: MEDICAID

## 2023-11-06 DIAGNOSIS — S93.401A SPRAIN OF RIGHT ANKLE, UNSPECIFIED LIGAMENT, INITIAL ENCOUNTER: ICD-10-CM

## 2023-11-06 PROCEDURE — 73718 MRI LOWER EXTREMITY W/O DYE: CPT | Mod: TC,RT

## 2023-11-06 PROCEDURE — 73718 MRI LOWER EXTREMITY W/O DYE: CPT | Mod: 26,RT,, | Performed by: RADIOLOGY

## 2023-11-06 PROCEDURE — 73718 MRI FOOT (HINDFOOT) RIGHT WITHOUT CONTRAST: ICD-10-PCS | Mod: 26,RT,, | Performed by: RADIOLOGY

## 2023-11-09 ENCOUNTER — TELEPHONE (OUTPATIENT)
Dept: ORTHOPEDICS | Facility: CLINIC | Age: 48
End: 2023-11-09
Payer: MEDICAID

## 2023-11-09 NOTE — TELEPHONE ENCOUNTER
----- Message from Dary Nate sent at 11/9/2023 10:06 AM CST -----  Contact: Self  Type: Needs Medical Advice    Who Called:  Patient  What is this regarding?:  She had an Mri on Monday on her foot and is wondering if the results are back yet.  Best Call Back Number:  [phone]  Additional Information:  Please call the patient back at the phone number listed above to advise. Thank you!

## 2023-11-09 NOTE — TELEPHONE ENCOUNTER
----- Message from Jorge Luis Lamar sent at 11/9/2023  3:11 PM CST -----  Regarding: wants to get MTI results call pt   Contact: pt   wants to get MTI results call pt

## 2023-11-15 ENCOUNTER — TELEPHONE (OUTPATIENT)
Dept: ORTHOPEDICS | Facility: CLINIC | Age: 48
End: 2023-11-15
Payer: MEDICAID

## 2023-11-15 ENCOUNTER — TELEPHONE (OUTPATIENT)
Dept: HEMATOLOGY/ONCOLOGY | Facility: CLINIC | Age: 48
End: 2023-11-15
Payer: MEDICAID

## 2023-11-15 NOTE — TELEPHONE ENCOUNTER
Spoke to pt to notify dr unavailable appt 11/17/23 and can reschedule with NP same day, pt declines and would like a call on Monday 11/20/23 to discuss rescheduling, notified lab/appt 11/17/23 will be canceled.

## 2023-11-15 NOTE — TELEPHONE ENCOUNTER
----- Message from Charito Elizabeth sent at 11/15/2023 11:19 AM CST -----  Regarding: results  Contact: patient  Type:  Test Results    Who Called:  patient  Name of Test (Lab/Mammo/Etc):  MRI  Date of Test:  11/06/23  Ordering Provider:  Dr. Gallagher  Where the test was performed:  [facility]  Best Call Back Number:  [phone] (  Additional Information:  Patient tried to cancel her appointment for today.  Please call patient to advise.  Thanks!

## 2023-11-28 ENCOUNTER — TELEPHONE (OUTPATIENT)
Dept: HEMATOLOGY/ONCOLOGY | Facility: CLINIC | Age: 48
End: 2023-11-28
Payer: MEDICAID

## 2023-12-10 ENCOUNTER — HOSPITAL ENCOUNTER (EMERGENCY)
Facility: HOSPITAL | Age: 48
Discharge: HOME | End: 2023-12-10
Attending: STUDENT IN AN ORGANIZED HEALTH CARE EDUCATION/TRAINING PROGRAM
Payer: MEDICAID

## 2023-12-10 VITALS
HEART RATE: 85 BPM | TEMPERATURE: 98 F | WEIGHT: 220 LBS | DIASTOLIC BLOOD PRESSURE: 87 MMHG | SYSTOLIC BLOOD PRESSURE: 169 MMHG | BODY MASS INDEX: 35.36 KG/M2 | RESPIRATION RATE: 20 BRPM | OXYGEN SATURATION: 99 % | HEIGHT: 66 IN

## 2023-12-10 DIAGNOSIS — R30.0 DYSURIA: Primary | ICD-10-CM

## 2023-12-10 LAB
BACTERIA #/AREA URNS HPF: ABNORMAL /HPF
BILIRUB UR QL STRIP: NEGATIVE
CLARITY UR: ABNORMAL
COLOR UR: YELLOW
GLUCOSE UR QL STRIP: ABNORMAL
HGB UR QL STRIP: ABNORMAL
HYALINE CASTS #/AREA URNS LPF: 0 /LPF
KETONES UR QL STRIP: NEGATIVE
LEUKOCYTE ESTERASE UR QL STRIP: ABNORMAL
MICROSCOPIC COMMENT: ABNORMAL
NITRITE UR QL STRIP: NEGATIVE
PH UR STRIP: 6 [PH] (ref 5–8)
PROT UR QL STRIP: ABNORMAL
RBC #/AREA URNS HPF: 25 /HPF (ref 0–4)
SP GR UR STRIP: 1.02 (ref 1–1.03)
SQUAMOUS #/AREA URNS HPF: ABNORMAL /HPF
URN SPEC COLLECT METH UR: ABNORMAL
UROBILINOGEN UR STRIP-ACNC: NEGATIVE EU/DL
WBC #/AREA URNS HPF: 50 /HPF (ref 0–5)

## 2023-12-10 PROCEDURE — 63600175 PHARM REV CODE 636 W HCPCS: Performed by: NURSE PRACTITIONER

## 2023-12-10 PROCEDURE — 81000 URINALYSIS NONAUTO W/SCOPE: CPT | Performed by: STUDENT IN AN ORGANIZED HEALTH CARE EDUCATION/TRAINING PROGRAM

## 2023-12-10 PROCEDURE — 99284 EMERGENCY DEPT VISIT MOD MDM: CPT

## 2023-12-10 PROCEDURE — 87186 SC STD MICRODIL/AGAR DIL: CPT | Performed by: STUDENT IN AN ORGANIZED HEALTH CARE EDUCATION/TRAINING PROGRAM

## 2023-12-10 PROCEDURE — 96372 THER/PROPH/DIAG INJ SC/IM: CPT | Performed by: NURSE PRACTITIONER

## 2023-12-10 PROCEDURE — 87088 URINE BACTERIA CULTURE: CPT | Performed by: STUDENT IN AN ORGANIZED HEALTH CARE EDUCATION/TRAINING PROGRAM

## 2023-12-10 PROCEDURE — 87077 CULTURE AEROBIC IDENTIFY: CPT | Performed by: STUDENT IN AN ORGANIZED HEALTH CARE EDUCATION/TRAINING PROGRAM

## 2023-12-10 PROCEDURE — 87086 URINE CULTURE/COLONY COUNT: CPT | Performed by: STUDENT IN AN ORGANIZED HEALTH CARE EDUCATION/TRAINING PROGRAM

## 2023-12-10 RX ORDER — NITROFURANTOIN 25; 75 MG/1; MG/1
100 CAPSULE ORAL 2 TIMES DAILY
Qty: 14 CAPSULE | Refills: 0 | Status: SHIPPED | OUTPATIENT
Start: 2023-12-10 | End: 2023-12-17

## 2023-12-10 RX ORDER — CEFTRIAXONE 1 G/1
1 INJECTION, POWDER, FOR SOLUTION INTRAMUSCULAR; INTRAVENOUS
Status: COMPLETED | OUTPATIENT
Start: 2023-12-10 | End: 2023-12-10

## 2023-12-10 RX ADMIN — CEFTRIAXONE 1 G: 1 INJECTION, POWDER, FOR SOLUTION INTRAMUSCULAR; INTRAVENOUS at 06:12

## 2023-12-10 NOTE — ED PROVIDER NOTES
Encounter Date: 12/10/2023       History     Chief Complaint   Patient presents with    Vaginal Pain    Dysuria     Pain with urination with urinary frequency     48-year-old female presents to ED with complaint of 2 day history of urinary frequency, urgency, dysuria.  It appears there was some miscommunication at triage concerning vaginal pain she denies vaginal pain on interview.  She denies associated fever, chills, nausea, vomiting or any other systemic symptoms.    The history is provided by the patient. No  was used.     Review of patient's allergies indicates:   Allergen Reactions    Trulicity [dulaglutide] Rash     Past Medical History:   Diagnosis Date    Abdominal pain     RIGHT MID ABD    Anemia     Anemia due to chronic blood loss 2020    Anxiety     Arthritis     Complex cyst of right ovary 2019    Diabetes mellitus     Diabetes mellitus, type 2     Encounter for blood transfusion     2    Encounter for well woman exam with routine gynecological exam 2019    Hypertension     Menorrhagia with regular cycle 2019    Menorrhagia with regular cycle 2020    Microcytic anemia 2020    Obesity     Ovarian cyst     right    Problem involving surgical incision 2019    Sinus congestion     Thyroid disease      Past Surgical History:   Procedure Laterality Date     SECTION      LAPAROSCOPIC SALPINGO-OOPHORECTOMY Bilateral 7/3/2019    Procedure: SALPINGO-OOPHORECTOMY, LAPAROSCOPIC;  Surgeon: Darin Hale MD;  Location: [facility] OR;  Service: OB/GYN;  Laterality: Bilateral;    LAPAROSCOPIC TOTAL HYSTERECTOMY N/A 7/3/2019    Procedure: HYSTERECTOMY, TOTAL, LAPAROSCOPIC;  Surgeon: Darin Hale MD;  Location: [facility] OR;  Service: OB/GYN;  Laterality: N/A;    OPEN REDUCTION AND INTERNAL FIXATION (ORIF) OF INJURY OF ANKLE Left     THYROIDECTOMY, PARTIAL      TUBAL LIGATION       Family History   Problem Relation Age of Onset    Cancer Mother      Hypertension Mother     Diabetes Mother     Arthritis Mother     Heart disease Mother     Kidney disease Mother     Cancer Father     Breast cancer Sister 58     Social History     Tobacco Use    Smoking status: Never    Smokeless tobacco: Never   Substance Use Topics    Alcohol use: No    Drug use: No     Review of Systems   Constitutional: Negative.    HENT: Negative.     Eyes: Negative.    Respiratory: Negative.     Cardiovascular: Negative.    Gastrointestinal: Negative.    Endocrine: Negative.    Genitourinary:  Positive for dysuria, frequency and urgency.   Musculoskeletal: Negative.    Skin: Negative.    Allergic/Immunologic: Negative.    Neurological: Negative.    Hematological: Negative.    Psychiatric/Behavioral: Negative.         Physical Exam     Initial Vitals [12/10/23 1702]   BP Pulse Resp Temp SpO2   (!) 169/87 85 20 98.1 °F (36.7 °C) 99 %      MAP       --         Physical Exam    Nursing note and vitals reviewed.  Constitutional: She appears well-developed.   HENT:   Head: Normocephalic.   Eyes: Pupils are equal, round, and reactive to light.   Neck:   Normal range of motion.  Cardiovascular:  Normal rate.           Pulmonary/Chest: Breath sounds normal. No respiratory distress.   Abdominal: Abdomen is soft. Bowel sounds are normal. She exhibits no distension. There is no abdominal tenderness. There is no rebound and no guarding.   Musculoskeletal:         General: Normal range of motion.      Cervical back: Normal range of motion.     Neurological: She is alert and oriented to person, place, and time.   Skin: Skin is warm. Capillary refill takes less than 2 seconds.   Psychiatric: She has a normal mood and affect.         ED Course   Procedures  Labs Reviewed   URINALYSIS, REFLEX TO URINE CULTURE - Abnormal; Notable for the following components:       Result Value    Appearance, UA Cloudy (*)     Protein, UA 2+ (*)     Glucose, UA 2+ (*)     Occult Blood UA 2+ (*)     Leukocytes, UA 1+ (*)     All  other components within normal limits    Narrative:     Preferred Collection Type->Urine, Clean Catch  Specimen Source->Urine   URINALYSIS MICROSCOPIC - Abnormal; Notable for the following components:    RBC, UA 25 (*)     WBC, UA 50 (*)     Bacteria Moderate (*)     All other components within normal limits    Narrative:     Preferred Collection Type->Urine, Clean Catch  Specimen Source->Urine   CULTURE, URINE          Imaging Results    None          Medications   cefTRIAXone injection 1 g (has no administration in time range)     Medical Decision Making  48-year-old female with dysuria, urine frequency and urgency.   Differential diagnosis UTI, pyelonephritis, kidney stone, others.  Exam is benign.  Suspect UTI.  UA shows blood, 1+ leukocyte esterase, negative nitrites, pyuria consistent with UTI.  Culture pending.  Rx Macrobid.  Patient was seen and reevaluated. Patient's symptoms seem to be stable. I discussed the patient's diagnosis, treatment plan, and plan for discharge with the patient. Patient was instructed to follow up with PCP and was given strict return precautions to the ED. The patient voiced understanding and agreed with the plan      Amount and/or Complexity of Data Reviewed  External Data Reviewed: labs.    Risk  Prescription drug management.                                      Clinical Impression:  Final diagnoses:  [R30.0] Dysuria (Primary)          ED Disposition Condition    Discharge Stable          ED Prescriptions       Medication Sig Dispense Start Date End Date Auth. Provider    nitrofurantoin, macrocrystal-monohydrate, (MACROBID) 100 MG capsule Take 1 capsule (100 mg total) by mouth 2 (two) times daily. for 7 days 14 capsule 12/10/2023 12/17/2023 Rylan Laird MD          Follow-up Information    None          Rylan Laird MD  12/10/23 5438

## 2023-12-10 NOTE — ED TRIAGE NOTES
Pt presents to the er with c/o vaginal pain x1 week. Pt states she has also been spotting and having urinary frequency.

## 2023-12-11 NOTE — DISCHARGE INSTRUCTIONS
Take antibiotics as prescribed  Follow up with primary care physician  May return to the ED at any time with any concerns

## 2023-12-12 LAB — BACTERIA UR CULT: ABNORMAL

## 2024-01-12 NOTE — TELEPHONE ENCOUNTER
----- Message from Heriberto Wall sent at 7/25/2023 11:06 AM CDT -----  Type:  Sooner Appointment Request    Caller is requesting a sooner appointment.  Caller declined first available appointment listed below.  Caller will not accept being placed on the waitlist and is requesting a message be sent to doctor.    Name of Caller:  Patient  When is the first available appointment?  Out of Template-- EP-Medicaid  Symptoms:  Follow Up  Best Call Back Number:  [phone]  Additional Information:         Cardiology Daily Progress Note    Assessment/Plan  Overview of  Current Cardiology Problems and Plans:  Patient was seen and examined.  Blood pressure remains stable.  Medications were reviewed with attending physician.  INR today 2.1, Pharmacy managing coumadin therapy.  Crisp second heart sound, status post metallic AVR  Right upper extremity is weak, but improving patient had previous CVA.  Alert oriented, needs OT PT, patient is very deconditioned.  Awaiting AMILCAR placement.      Patient has subdural and subarachnoid bleeding, fortunately does not appear to show hydrocephalus, neurosurgery and attending physician will keep following the patient as hydrocephalus can develop later on..  PEG insertion site wound -, surgical service is following the patient.    Bilateral subdural hematoma: Cause indeterminate.  Some arachnoid bleeding also  12/15/24 : Greyson holes   12/19/24 craniotomy and evacuation of hematoma      Post op PEG , Trach ,      12/24/24 open gastrostomy tube placement      1/4/24 DEBRIDEMENT OF ABDOMINAL WALL, TRACHEOSTOMY EXCHANGE, WOUND VAC PLACEMENT      Hypertension: Controlled, range 105//86.     Metallic aortic valve replacement: 7/27/23. INR today 2.1. Pharmacy managing warfarin.     Echo 12/18/23    1. Left ventricle: The cavity size is normal. Wall thickness is mildly     increased. Systolic function is at the lower limits of normal by visual     assessment. The ejection fraction was measured by biplane method of disks.     The ejection fraction is 50%.  2. Aortic valve: A mechanical valve is present. There is no stenosis. There is     mild regurgitation. The mean systolic gradient is 23mm Hg. The LVOT to     aortic valve VTI ratio is 0.33.  3. Right ventricle: The cavity size is normal. Systolic function is normal.     The RV pressure during systole is 34mm Hg.     PAF: Hx of left atrial appendage ligation. Remains in sinus rhythm. On coumadin therapy for metallic AVR.     Hx of cva       SVT: Resolved, remains in sinus rhythm. On beta blocker.      Prolonged QTc: Improved, 01/08 EKG Qtc 496. 01/06 . Continue telemetry. Recommend keep potassium > 4.0 and magnesium > 2.0. May be due to head injury and escitalopram and Quetiapine. Quetiapine being held. Tele: sinus rhythm.      RBBB with LAFB      Anemia: H/H 9.0/30.2 today.     Patients primary cardiologist is Dr Pro Rosado from [facility].     Discussed with RN who has no cardiovascular complaints. Awaiting rehab placement.    Subjective  Able to answer simple questions. Denies any chest pain or shortness of breath.    Objective  Vital signs in last 24 hours:  Temp:  [98.2 °F (36.8 °C)-99 °F (37.2 °C)] 98.2 °F (36.8 °C)  Heart Rate:  [54-74] 54  Resp:  [16] 16  BP: (105-132)/(66-86) 132/86  FiO2 (%):  [28 %] 28 %    Intake/Output last 3 shifts:  I/O last 3 completed shifts:  In: 1560 [NG/GT:1560]  Out: 900 [Urine:900]  Intake/Output this shift:  I/O this shift:  In: 390 [NG/GT:390]  Out: 0      Physical Exam    Constitutional: Alert and oriented, No acute distress.  Speech has improved, cranial incisions have almost healed, more cooperative   cardiovascular: Normal rate, Regular rhythm, metallic S2 is crisp,, very soft systolic murmurs, no gallops. Tele: SR. midline sternotomy incision is healed,  Respiratory: Non-labored respirations, Trach collar in place.  Tracheostomy site has no bleeding.  GI: Abdomen soft, abdominal binder in place.  Obese, J-tube wound is healing .  Musculoskeletal: Limited mobility, in bed.  Integument:  Warm, Dry.   Neuro: Alert, Oriented, cooperative, oriented x 3 in right upper extremity weakness compared to the right lower extremity from previous CVA.  Cranial incisions are healing well.  Tracheostomy is in place.    Psych: Appropriate mood and affect, cooperative.  Patient is little bit depressed regarding her current events.  Exhibits poor motivation for OT PT  Medications:  Current Facility-Administered  Medications   Medication Dose Route Frequency Provider Last Rate Last Admin    furosemide (LASIX) tablet 20 mg  20 mg Per PEG Tube Daily Chace Mejia MD   20 mg at 01/12/24 0802    lisinopril (ZESTRIL) tablet 20 mg  20 mg Per PEG Tube BID Nancy Lucas MD   20 mg at 01/12/24 0802    amLODIPine (NORVASC) tablet 5 mg  5 mg Per PEG Tube Daily Nancy Lucas MD   5 mg at 01/12/24 0802    melatonin tablet 3 mg  3 mg Per PEG Tube Nightly Jose Alfredo Powell MD   3 mg at 01/11/24 2122    metoPROLOL tartrate (LOPRESSOR) tablet 25 mg  25 mg Per PEG Tube Daily Chace Mejia MD   25 mg at 01/11/24 0809    WARFARIN - PHARMACIST MONITORED Misc   Does not apply See Admin Instructions Esdras Land S, APNP        hydrALAZINE (APRESOLINE) tablet 25 mg  25 mg Per PEG Tube Once Jose Alfredo Powell MD        chlorhexidine gluconate (PERIDEX) 0.12 % solution 15 mL  15 mL Swish & Spit 2 times per day Britta Woo MD   15 mL at 01/12/24 0802    [Held by provider] QUEtiapine (SEROquel) tablet 25 mg  25 mg Per PEG Tube Nightly Cydney Mann MD   25 mg at 01/06/24 2136    Potassium Standard Replacement Protocol (Levels 3.5 and lower)   Does not apply See Admin Instructions Cydney Mann MD        Potassium Replacement (Levels 3.6 - 4)   Does not apply See Admin Instructions Cydney Mann MD        Magnesium Standard Replacement Protocol   Does not apply See Admin Instructions Cydney Mann MD        atorvastatin (LIPITOR) tablet 40 mg  40 mg Per PEG Tube Nightly Beatris Bui MD   40 mg at 01/11/24 2122    escitalopram (LEXAPRO) tablet 10 mg  10 mg Per PEG Tube Daily Beatris Bui MD   10 mg at 01/12/24 0802    pantoprazole (PROTONIX) 40 MG/20ML (compounded) suspension 40 mg  40 mg Per PEG Tube 2 times per day Beatris Bui MD   40 mg at 01/12/24 0802    sodium chloride 0.9 % injection 2 mL  2 mL Intracatheter 2 times per day Petrona Rao PA-C   2 mL at 01/12/24 0808    insulin lispro (ADMELOG,HumaLOG) - Correction Dose    Subcutaneous 4 times per day Jose Alfredo Powell MD   1 Units at 01/12/24 0100     Current Facility-Administered Medications   Medication Dose Route Frequency Provider Last Rate Last Admin     Current Facility-Administered Medications   Medication Dose Route Frequency Provider Last Rate Last Admin    butalbital-APAP-caffeine (FIORICET) -40 MG tablet 1 tablet  1 tablet Per PEG Tube Q4H PRN Beatris Bui MD        hydrALAZINE (APRESOLINE) injection 10 mg  10 mg Intravenous Q4H PRN Jose Alfredo Powell MD   10 mg at 01/05/24 1313    oxyCODONE (IMM REL) (ROXICODONE) 5 MG/5ML solution 5 mg  5 mg Per PEG Tube Q4H PRN Jackie Faye CNP   5 mg at 01/11/24 2223    hydrOXYzine (ATARAX) tablet 25 mg  25 mg Per PEG Tube TID PRN Joanne Rae MD   25 mg at 01/10/24 2251    acetaminophen (TYLENOL) tablet 1,000 mg  1,000 mg Per PEG Tube Q12H PRN Joanne Rae MD   1,000 mg at 01/12/24 0802    docusate sodium (COLACE) 50 MG/5ML liquid 100 mg  100 mg Per PEG Tube Daily PRN Cydney Mann MD        polyethylene glycol (MIRALAX) packet 17 g  17 g Per PEG Tube Daily PRN Cydney Mann MD        dextrose (GLUTOSE) 40 % gel 15 g  15 g Per PEG Tube PRN Beatris Bui MD        dextrose (GLUTOSE) 40 % gel 30 g  30 g Per PEG Tube PRN Beatris Bui MD        ondansetron (ZOFRAN) injection 4 mg  4 mg Intravenous Q6H PRN Henny Tee APNP   4 mg at 01/09/24 2018    bisacodyl (DULCOLAX) suppository 10 mg  10 mg Rectal Daily PRN Cydney Mann MD        dextrose 50 % injection 25 g  25 g Intravenous PRN Jose Alfredo Powell MD        dextrose 50 % injection 12.5 g  12.5 g Intravenous PRN Jose Alfredo Powell MD   12.5 g at 12/31/23 0805    glucagon (GLUCAGEN) injection 1 mg  1 mg Intramuscular PRN Jose Alfredo Powell MD            Laboratory:  Recent Results (from the past 24 hour(s))   GLUCOSE, BEDSIDE - POINT OF CARE    Collection Time: 01/11/24 11:07 AM   Result Value Ref Range    GLUCOSE, BEDSIDE - POINT OF CARE 120 (H) 70 - 99 mg/dL   GLUCOSE,  BEDSIDE - POINT OF CARE    Collection Time: 01/11/24  5:43 PM   Result Value Ref Range    GLUCOSE, BEDSIDE - POINT OF CARE 108 (H) 70 - 99 mg/dL   GLUCOSE, BEDSIDE - POINT OF CARE    Collection Time: 01/11/24 11:55 PM   Result Value Ref Range    GLUCOSE, BEDSIDE - POINT OF CARE 157 (H) 70 - 99 mg/dL   GLUCOSE, BEDSIDE - POINT OF CARE    Collection Time: 01/12/24  5:12 AM   Result Value Ref Range    GLUCOSE, BEDSIDE - POINT OF CARE 93 70 - 99 mg/dL   Prothrombin Time (INR/PT)    Collection Time: 01/12/24  6:09 AM   Result Value Ref Range    Protime- PT 21.9 (H) 9.7 - 11.8 sec    INR 2.1     CBC No Differential    Collection Time: 01/12/24  6:10 AM   Result Value Ref Range    WBC 7.1 4.2 - 11.0 K/mcL    RBC 3.46 (L) 4.00 - 5.20 mil/mcL    HGB 9.0 (L) 12.0 - 15.5 g/dL    HCT 30.2 (L) 36.0 - 46.5 %    MCV 87.3 78.0 - 100.0 fl    MCH 26.0 26.0 - 34.0 pg    MCHC 29.8 (L) 32.0 - 36.5 g/dL     140 - 450 K/mcL    RDW-CV 23.9 (H) 11.0 - 15.0 %    RDW-SD 74.1 (H) 39.0 - 50.0 fL    NRBC 0 <=0 /100 WBC        Chace Mejia MD [facility]  Marleny Lackey, CNP  1/12/2024  9:57 AM

## 2024-02-12 ENCOUNTER — HOSPITAL ENCOUNTER (OUTPATIENT)
Dept: RADIOLOGY | Facility: HOSPITAL | Age: 49
Discharge: HOME | End: 2024-02-12
Attending: INTERNAL MEDICINE
Payer: MEDICAID

## 2024-02-12 DIAGNOSIS — R26.81 DIFFICULTY STANDING: ICD-10-CM

## 2024-02-12 DIAGNOSIS — M19.90 ARTHRITIS: ICD-10-CM

## 2024-02-12 PROCEDURE — 72100 X-RAY EXAM L-S SPINE 2/3 VWS: CPT | Mod: TC

## 2024-02-12 PROCEDURE — 73020 X-RAY EXAM OF SHOULDER: CPT | Mod: TC,LT

## 2024-02-12 PROCEDURE — 73020 X-RAY EXAM OF SHOULDER: CPT | Mod: 26,LT,, | Performed by: RADIOLOGY

## 2024-02-12 PROCEDURE — 72100 X-RAY EXAM L-S SPINE 2/3 VWS: CPT | Mod: 26,,, | Performed by: RADIOLOGY

## 2024-02-12 PROCEDURE — 73020 X-RAY EXAM OF SHOULDER: CPT | Mod: TC,RT

## 2024-02-12 PROCEDURE — 73020 X-RAY EXAM OF SHOULDER: CPT | Mod: 26,RT,, | Performed by: RADIOLOGY

## 2024-04-03 ENCOUNTER — TELEPHONE (OUTPATIENT)
Dept: FAMILY MEDICINE | Facility: CLINIC | Age: 49
End: 2024-04-03
Payer: MEDICAID

## 2024-04-03 NOTE — TELEPHONE ENCOUNTER
Called patient in regards to an appointment. Patient will need to establish care with a PCP. At this time our medicaid panel is full and patient will need to call medicaid escalation line. No answer, left voicemail to return call.

## 2024-04-03 NOTE — TELEPHONE ENCOUNTER
----- Message from Cayla Nayak sent at 4/3/2024  8:59 AM CDT -----  Contact: self  Type: Sooner Appointment Request        Caller is requesting a sooner appointment. Caller declined first available appointment listed below. Caller will not accept being placed on the waitlist and is requesting a message be sent to doctor.        Name of Caller: Patient   Best Call Back Number: [phone]  Additional Information: Pt wants to see an actual MD this visit has concerns about right big toe having blood under the toenail bed. Pt also has diabetes. Pt also needs med refills. Plz call to get her seen before 4/26/24. Pt states its been about a month since she first seen her the blood under the toenail. Thanks

## 2024-05-17 ENCOUNTER — HOSPITAL ENCOUNTER (EMERGENCY)
Facility: HOSPITAL | Age: 49
Discharge: HOME | End: 2024-05-17
Attending: EMERGENCY MEDICINE
Payer: MEDICAID

## 2024-05-17 VITALS
BODY MASS INDEX: 36.16 KG/M2 | HEIGHT: 66 IN | HEART RATE: 89 BPM | RESPIRATION RATE: 16 BRPM | TEMPERATURE: 99 F | DIASTOLIC BLOOD PRESSURE: 73 MMHG | SYSTOLIC BLOOD PRESSURE: 151 MMHG | WEIGHT: 225 LBS | OXYGEN SATURATION: 97 %

## 2024-05-17 DIAGNOSIS — N30.01 ACUTE CYSTITIS WITH HEMATURIA: ICD-10-CM

## 2024-05-17 DIAGNOSIS — N89.8 VAGINAL ITCHING: Primary | ICD-10-CM

## 2024-05-17 DIAGNOSIS — B37.31 YEAST VAGINITIS: ICD-10-CM

## 2024-05-17 LAB
BACTERIA #/AREA URNS HPF: ABNORMAL /HPF
BILIRUB UR QL STRIP: NEGATIVE
CLARITY UR: ABNORMAL
COLOR UR: YELLOW
GLUCOSE UR QL STRIP: ABNORMAL
HGB UR QL STRIP: NEGATIVE
HYALINE CASTS #/AREA URNS LPF: 0 /LPF
KETONES UR QL STRIP: ABNORMAL
LEUKOCYTE ESTERASE UR QL STRIP: NEGATIVE
MICROSCOPIC COMMENT: ABNORMAL
NITRITE UR QL STRIP: NEGATIVE
PH UR STRIP: 6 [PH] (ref 5–8)
PROT UR QL STRIP: ABNORMAL
RBC #/AREA URNS HPF: 1 /HPF (ref 0–4)
SP GR UR STRIP: 1.02 (ref 1–1.03)
SQUAMOUS #/AREA URNS HPF: 10 /HPF
URN SPEC COLLECT METH UR: ABNORMAL
UROBILINOGEN UR STRIP-ACNC: 4 EU/DL
WBC #/AREA URNS HPF: 3 /HPF (ref 0–5)
YEAST URNS QL MICRO: ABNORMAL

## 2024-05-17 PROCEDURE — 99284 EMERGENCY DEPT VISIT MOD MDM: CPT

## 2024-05-17 PROCEDURE — 81000 URINALYSIS NONAUTO W/SCOPE: CPT | Performed by: EMERGENCY MEDICINE

## 2024-05-17 RX ORDER — FLUCONAZOLE 150 MG/1
150 TABLET ORAL DAILY
Qty: 5 TABLET | Refills: 1 | Status: SHIPPED | OUTPATIENT
Start: 2024-05-17 | End: 2024-05-18

## 2024-05-17 RX ORDER — NITROFURANTOIN 25; 75 MG/1; MG/1
100 CAPSULE ORAL 2 TIMES DAILY
Qty: 14 CAPSULE | Refills: 0 | Status: SHIPPED | OUTPATIENT
Start: 2024-05-17 | End: 2024-05-22

## 2024-05-17 NOTE — Clinical Note
"Haydee"Roro Allen was seen and treated in our emergency department on 5/17/2024.  She may return to work on 05/18/2024.       If you have any questions or concerns, please don't hesitate to call.      Maurilio Hurd MD"

## 2024-05-17 NOTE — DISCHARGE INSTRUCTIONS
Follow-up with the primary care physician as necessary, return emergency with any worsening symptomatology to include vaginal itching, fever, chills, flank pain or any other concerns.  Take all medications as prescribed until completed.

## 2024-05-17 NOTE — ED PROVIDER NOTES
Encounter Date: 2024       History     Chief Complaint   Patient presents with    Vaginal Itching     C/O vaginal discharge and itching, also stated she has a laceration over labia that is painful.       Previously healthy 49-year-old female comes emergency complaints of vaginal itching and mild dysuria.  Three 4 days duration.  Stable otherwise.      Review of patient's allergies indicates:   Allergen Reactions    Trulicity [dulaglutide] Rash     Past Medical History:   Diagnosis Date    Abdominal pain     RIGHT MID ABD    Anemia     Anemia due to chronic blood loss 2020    Anxiety     Arthritis     Complex cyst of right ovary 2019    Diabetes mellitus     Diabetes mellitus, type 2     Encounter for blood transfusion     2    Encounter for well woman exam with routine gynecological exam 2019    Hypertension     Menorrhagia with regular cycle 2019    Menorrhagia with regular cycle 2020    Microcytic anemia 2020    Obesity     Ovarian cyst     right    Problem involving surgical incision 2019    Sinus congestion     Thyroid disease      Past Surgical History:   Procedure Laterality Date     SECTION      LAPAROSCOPIC SALPINGO-OOPHORECTOMY Bilateral 7/3/2019    Procedure: SALPINGO-OOPHORECTOMY, LAPAROSCOPIC;  Surgeon: Darin Hale MD;  Location: [facility] OR;  Service: OB/GYN;  Laterality: Bilateral;    LAPAROSCOPIC TOTAL HYSTERECTOMY N/A 7/3/2019    Procedure: HYSTERECTOMY, TOTAL, LAPAROSCOPIC;  Surgeon: Darin Hale MD;  Location: [facility] OR;  Service: OB/GYN;  Laterality: N/A;    OPEN REDUCTION AND INTERNAL FIXATION (ORIF) OF INJURY OF ANKLE Left     THYROIDECTOMY, PARTIAL      TUBAL LIGATION       Family History   Problem Relation Name Age of Onset    Cancer Mother      Hypertension Mother      Diabetes Mother      Arthritis Mother      Heart disease Mother      Kidney disease Mother      Cancer Father      Breast cancer Sister  58     Social History     Tobacco Use     Smoking status: Never    Smokeless tobacco: Never   Substance Use Topics    Alcohol use: No    Drug use: No     Review of Systems   Constitutional:  Negative for fever.   HENT:  Negative for sore throat.    Respiratory:  Negative for shortness of breath.    Cardiovascular:  Negative for chest pain.   Gastrointestinal:  Negative for nausea.   Genitourinary:  Positive for dysuria.          Mild vaginal itching   Musculoskeletal:  Negative for back pain.   Skin:  Negative for rash.   Neurological:  Negative for weakness.   Hematological:  Does not bruise/bleed easily.   All other systems reviewed and are negative.      Physical Exam     Initial Vitals [05/17/24 0809]   BP Pulse Resp Temp SpO2   (!) 151/73 89 16 98.5 °F (36.9 °C) 97 %      MAP       --         Physical Exam    Nursing note and vitals reviewed.  Constitutional: She appears well-developed and well-nourished.   HENT:   Head: Normocephalic and atraumatic.   Eyes: Pupils are equal, round, and reactive to light.   Neck:   Normal range of motion.  Musculoskeletal:         General: Normal range of motion.      Cervical back: Normal range of motion.     Neurological: She is alert and oriented to person, place, and time. She has normal strength. GCS score is 15. GCS eye subscore is 4. GCS verbal subscore is 5. GCS motor subscore is 6.   Skin: Skin is warm and dry.   Psychiatric: She has a normal mood and affect. Her behavior is normal. Judgment and thought content normal.         ED Course   Procedures  Labs Reviewed   URINALYSIS, REFLEX TO URINE CULTURE - Abnormal; Notable for the following components:       Result Value    Appearance, UA Hazy (*)     Protein, UA 1+ (*)     Glucose, UA 2+ (*)     Ketones, UA Trace (*)     All other components within normal limits    Narrative:     Preferred Collection Type->Urine, Clean Catch  Specimen Source->Urine   URINALYSIS MICROSCOPIC - Abnormal; Notable for the following components:    Bacteria Moderate (*)     Yeast,  UA Occasional (*)     All other components within normal limits    Narrative:     Preferred Collection Type->Urine, Clean Catch  Specimen Source->Urine          Imaging Results    None          Medications - No data to display  Medical Decision Making   Yeast infection, BV, UTI    Amount and/or Complexity of Data Reviewed  Discussion of management or test interpretation with external provider(s):  The patient has a urinary tract infection likely on top of the yeast infection.  I will treat for both.  Discharge home.                                      Clinical Impression:  Final diagnoses:  [N89.8] Vaginal itching (Primary)  [B37.31] Yeast vaginitis  [N30.01] Acute cystitis with hematuria          ED Disposition Condition    Discharge Stable          ED Prescriptions       Medication Sig Dispense Start Date End Date Auth. Provider    fluconazole (DIFLUCAN) 150 MG Tab Take 1 tablet (150 mg total) by mouth once daily. for 1 day 5 tablet 5/17/2024 5/18/2024 Maurilio Hurd MD    nitrofurantoin, macrocrystal-monohydrate, (MACROBID) 100 MG capsule Take 1 capsule (100 mg total) by mouth 2 (two) times daily. for 5 days 14 capsule 5/17/2024 5/22/2024 Maurilio Hurd MD          Follow-up Information    None          Maurilio Hurd MD  05/17/24 1646

## 2024-06-09 ENCOUNTER — HOSPITAL ENCOUNTER (EMERGENCY)
Facility: HOSPITAL | Age: 49
Discharge: HOME | End: 2024-06-09
Attending: EMERGENCY MEDICINE
Payer: MEDICAID

## 2024-06-09 VITALS
HEIGHT: 66 IN | WEIGHT: 220 LBS | BODY MASS INDEX: 35.36 KG/M2 | OXYGEN SATURATION: 100 % | HEART RATE: 79 BPM | DIASTOLIC BLOOD PRESSURE: 62 MMHG | RESPIRATION RATE: 17 BRPM | TEMPERATURE: 98 F | SYSTOLIC BLOOD PRESSURE: 123 MMHG

## 2024-06-09 DIAGNOSIS — M54.50 ACUTE RIGHT-SIDED LOW BACK PAIN WITHOUT SCIATICA: Primary | ICD-10-CM

## 2024-06-09 LAB
BACTERIA #/AREA URNS HPF: ABNORMAL /HPF
BILIRUB UR QL STRIP: NEGATIVE
CLARITY UR: CLEAR
COLOR UR: YELLOW
GLUCOSE UR QL STRIP: ABNORMAL
HGB UR QL STRIP: NEGATIVE
KETONES UR QL STRIP: ABNORMAL
LEUKOCYTE ESTERASE UR QL STRIP: NEGATIVE
MICROSCOPIC COMMENT: ABNORMAL
NITRITE UR QL STRIP: NEGATIVE
PH UR STRIP: 6 [PH] (ref 5–8)
PROT UR QL STRIP: NEGATIVE
RBC #/AREA URNS HPF: 3 /HPF (ref 0–4)
SP GR UR STRIP: 1.02 (ref 1–1.03)
SQUAMOUS #/AREA URNS HPF: 15 /HPF
URN SPEC COLLECT METH UR: ABNORMAL
UROBILINOGEN UR STRIP-ACNC: 1 EU/DL
WBC #/AREA URNS HPF: 12 /HPF (ref 0–5)
YEAST URNS QL MICRO: ABNORMAL

## 2024-06-09 PROCEDURE — 99283 EMERGENCY DEPT VISIT LOW MDM: CPT

## 2024-06-09 PROCEDURE — 25000003 PHARM REV CODE 250: Performed by: EMERGENCY MEDICINE

## 2024-06-09 PROCEDURE — 81000 URINALYSIS NONAUTO W/SCOPE: CPT | Performed by: STUDENT IN AN ORGANIZED HEALTH CARE EDUCATION/TRAINING PROGRAM

## 2024-06-09 PROCEDURE — 87086 URINE CULTURE/COLONY COUNT: CPT | Performed by: STUDENT IN AN ORGANIZED HEALTH CARE EDUCATION/TRAINING PROGRAM

## 2024-06-09 RX ORDER — KETOROLAC TROMETHAMINE 10 MG/1
10 TABLET, FILM COATED ORAL
Status: COMPLETED | OUTPATIENT
Start: 2024-06-09 | End: 2024-06-09

## 2024-06-09 RX ORDER — LIDOCAINE 50 MG/G
1 PATCH TOPICAL
Status: DISCONTINUED | OUTPATIENT
Start: 2024-06-09 | End: 2024-06-09 | Stop reason: HOSPADM

## 2024-06-09 RX ORDER — MELOXICAM 15 MG/1
15 TABLET ORAL DAILY PRN
Qty: 14 TABLET | Refills: 0 | Status: SHIPPED | OUTPATIENT
Start: 2024-06-09 | End: 2024-07-09

## 2024-06-09 RX ADMIN — KETOROLAC TROMETHAMINE 10 MG: 10 TABLET, FILM COATED ORAL at 06:06

## 2024-06-09 RX ADMIN — LIDOCAINE 5% 1 PATCH: 700 PATCH TOPICAL at 06:06

## 2024-06-09 NOTE — ED PROVIDER NOTES
History     Chief Complaint   Patient presents with    Back Pain     Pt reports right lower back pain since Tuesday unrelieved by otc meds and treatments. Pt reports pain is worse when she lays down at night. 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 Pt denies any pain with urination.      HPI:  Haydee Allen is a 49 y.o. female with PMH as below who presents to the [facility] emergency department for evaluation of gradual onset, moderate low back pain radiating to the right since Tuesday, only partially alleviated by OTC medications. She does moderate heavy lifting and twisting, working in the [employer].       PCP: No, Primary Doctor    Review of patient's allergies indicates:   Allergen Reactions    Trulicity [dulaglutide] Rash      Past Medical History:   Diagnosis Date    Abdominal pain     RIGHT MID ABD    Anemia     Anemia due to chronic blood loss 2020    Anxiety     Arthritis     Complex cyst of right ovary 2019    Diabetes mellitus     Diabetes mellitus, type 2     Encounter for blood transfusion     2    Encounter for well woman exam with routine gynecological exam 2019    Hypertension     Menorrhagia with regular cycle 2019    Menorrhagia with regular cycle 2020    Microcytic anemia 2020    Obesity     Ovarian cyst     right    Problem involving surgical incision 2019    Sinus congestion     Thyroid disease      Past Surgical History:   Procedure Laterality Date     SECTION      LAPAROSCOPIC SALPINGO-OOPHORECTOMY Bilateral 7/3/2019    Procedure: SALPINGO-OOPHORECTOMY, LAPAROSCOPIC;  Surgeon: Darin Hale MD;  Location: [facility] OR;  Service: OB/GYN;  Laterality: Bilateral;    LAPAROSCOPIC TOTAL HYSTERECTOMY N/A 7/3/2019    Procedure: HYSTERECTOMY, TOTAL, LAPAROSCOPIC;  Surgeon: Darin Hale MD;  Location: [facility] OR;  Service: OB/GYN;  Laterality: N/A;    OPEN  REDUCTION AND INTERNAL FIXATION (ORIF) OF INJURY OF ANKLE Left     THYROIDECTOMY, PARTIAL      TUBAL LIGATION         Family History   Problem Relation Name Age of Onset    Cancer Mother      Hypertension Mother      Diabetes Mother      Arthritis Mother      Heart disease Mother      Kidney disease Mother      Cancer Father      Breast cancer Sister  58     Social History     Tobacco Use    Smoking status: Never    Smokeless tobacco: Never   Substance and Sexual Activity    Alcohol use: No    Drug use: No    Sexual activity: Never      Review of Systems     Review of Systems   Constitutional: Negative.    HENT: Negative.     Eyes: Negative.    Respiratory: Negative.     Cardiovascular: Negative.    Gastrointestinal: Negative.    Endocrine: Negative.    Genitourinary: Negative.  Negative for dysuria and hematuria.   Musculoskeletal: Negative.    Skin: Negative.    Allergic/Immunologic: Negative.    Neurological: Negative.    Hematological: Negative.    Psychiatric/Behavioral: Negative.     All other systems reviewed and are negative.       Physical Exam     Initial Vitals [06/09/24 0533]   BP Pulse Resp Temp SpO2   (!) 177/81 79 17 98.2 °F (36.8 °C) 100 %      MAP       --          Nursing notes and vital signs reviewed.  Constitutional: Patient is in no acute distress.   Head: Normocephalic. Atraumatic.   Eyes:  Conjunctivae are not pale. No scleral icterus.   ENT: Mucous membranes moist.   Neck: Supple.   Cardiovascular: Regular rate. Regular rhythm.   Pulmonary: No respiratory distress.   Abdominal: Non-distended.   Musculoskeletal: Moves all extremities. No obvious deformities. Midline lumbar mild tenderness without step-off. 5/5 strength BLE, ambulatory.   Skin: Warm and dry.   Neurological:  Alert, awake, and appropriate. Normal speech. No acute lateralizing neurologic deficits appreciated.   Psychiatric: Normal affect.       ED Course   Procedures  Vitals:    06/09/24 0533   BP: (!) 177/81   Pulse: 79   Resp:  "17   Temp: 98.2 °F (36.8 °C)   TempSrc: Oral   SpO2: 100%   Weight: 99.8 kg (220 lb)   Height: 5' 6" (1.676 m)     Lab Results Interpreted as Abnormal:  Labs Reviewed   URINALYSIS, REFLEX TO URINE CULTURE - Abnormal; Notable for the following components:       Result Value    Glucose, UA 3+ (*)     Ketones, UA Trace (*)     All other components within normal limits    Narrative:     Preferred Collection Type->Urine, Clean Catch  Specimen Source->Urine   URINALYSIS MICROSCOPIC    Narrative:     Preferred Collection Type->Urine, Clean Catch  Specimen Source->Urine      All Lab Results:  Results for orders placed or performed during the hospital encounter of 06/09/24   Urinalysis, Reflex to Urine Culture Urine, Clean Catch    Specimen: Urine, Clean Catch   Result Value Ref Range    Specimen UA Urine, Unspecified     Color, UA Yellow Yellow, Straw, Kaia    Appearance, UA Clear Clear    pH, UA 6.0 5.0 - 8.0    Specific Gravity, UA 1.025 1.005 - 1.030    Protein, UA Negative Negative    Glucose, UA 3+ (A) Negative    Ketones, UA Trace (A) Negative    Bilirubin (UA) Negative Negative    Occult Blood UA Negative Negative    Nitrite, UA Negative Negative    Urobilinogen, UA 1.0 Negative EU/dL    Leukocytes, UA Negative Negative   Urinalysis Microscopic   Result Value Ref Range    Microscopic Comment SEE COMMENT      Imaging Results    None          The emergency physician reviewed the vital signs / test results outlined above.     ED Discussion      Patient's evaluation in the ED does not suggest any emergent or life-threatening medical conditions requiring immediate intervention beyond what was provided in the ED, and I believe patient is safe for discharge. Regardless, an unremarkable evaluation in the ED does not preclude the development or presence of a serious or life-threatening condition. As such, patient was given return instructions for any change or worsening of symptoms.       ED Medication(s) " Administered:  Medications   ketorolac tablet 10 mg (has no administration in time range)   LIDOcaine 5 % patch 1 patch (has no administration in time range)       Prescription Management: I performed a review of the patient's current Rx medication list as input by nursing staff.    Patient's Medications   New Prescriptions    MELOXICAM (MOBIC) 15 MG TABLET    Take 1 tablet (15 mg total) by mouth daily as needed for Pain.   Previous Medications    AMLODIPINE (NORVASC) 10 MG TABLET    Take 1 tablet (10 mg total) by mouth once daily.    ATORVASTATIN (LIPITOR) 10 MG TABLET    Take 1 tablet (10 mg total) by mouth once daily.    BACLOFEN (LIORESAL) 10 MG TABLET    One PO TID PRN back pain    BLOOD SUGAR DIAGNOSTIC (TRUE METRIX GLUCOSE TEST STRIP MISC)    True Metrix Glucose Test Strip   Use twice daily as directed    BUTALBITAL-ACETAMINOPHEN-CAFFEINE -40 MG (FIORICET, ESGIC) -40 MG PER TABLET    Take 1 tablet by mouth every 4 (four) hours as needed.    DOCUSATE SODIUM (COLACE) 100 MG CAPSULE    Stool Softener 100 mg capsule    DOXYCYCLINE (VIBRAMYCIN) 100 MG CAP    Take 100 mg by mouth every 12 (twelve) hours.    FERROUS SULFATE 325 (65 FE) MG EC TABLET    Take 325 mg by mouth 3 (three) times daily with meals.    FLASH GLUCOSE SCANNING READER (FREESTYLE TANGELA 14 DAY READER) MISC    Use with freestyle tangela to monitor glucose    FLASH GLUCOSE SENSOR (FREESTYLE TANGELA 2 SENSOR) KIT    1 Device by Misc.(Non-Drug; Combo Route) route every 14 (fourteen) days.    HYDROCHLOROTHIAZIDE (HYDRODIURIL) 12.5 MG TAB    Take 1 tablet (12.5 mg total) by mouth once daily.    LEVOTHYROXINE (SYNTHROID) 200 MCG TABLET    Take 1 tablet (200 mcg total) by mouth before breakfast. Take one extra tablet (400 mcg total) on Sundays.    LINACLOTIDE (LINZESS) 72 MCG CAP CAPSULE    Take 72 mcg by mouth before breakfast.    LISINOPRIL (PRINIVIL,ZESTRIL) 40 MG TABLET    Take 1 tablet (40 mg total) by mouth once daily.    METFORMIN  "(GLUCOPHAGE-XR) 500 MG ER 24HR TABLET    Take 1 tablet (500 mg total) by mouth 2 (two) times daily with meals.    PEN NEEDLE, DIABETIC (BD CINDY 2ND GEN PEN NEEDLE) 32 GAUGE X 5/32" NDLE    Inject 1 Box into the skin 2 (two) times a day.   Modified Medications    No medications on file   Discontinued Medications    DICLOFENAC (VOLTAREN) 75 MG EC TABLET    Take 1 tablet (75 mg total) by mouth 2 (two) times daily as needed (back pain).    NAPROXEN (NAPROSYN) 500 MG TABLET    Take 500 mg by mouth 2 (two) times daily.         Follow-up Information       your primary care physician. Schedule an appointment as soon as possible for a visit in 1 week.               [facility] Emergency Dept.    Specialty: Emergency Medicine  Why: As needed, If symptoms worsen  Contact information:  [address] [place] [id number] [phone]                          Clinical Impression       ICD-10-CM ICD-9-CM   1. Acute right-sided low back pain without sciatica  M54.50 724.2      ED Disposition Condition    Discharge Stable             Armaan Jacobson MD  06/09/24 0611    "

## 2024-06-10 LAB
BACTERIA UR CULT: NORMAL
BACTERIA UR CULT: NORMAL

## 2024-06-22 ENCOUNTER — HOSPITAL ENCOUNTER (EMERGENCY)
Facility: HOSPITAL | Age: 49
Discharge: LEFT BEFORE DISCHARGE | End: 2024-06-22
Attending: STUDENT IN AN ORGANIZED HEALTH CARE EDUCATION/TRAINING PROGRAM
Payer: MEDICAID

## 2024-06-22 VITALS
HEART RATE: 75 BPM | SYSTOLIC BLOOD PRESSURE: 160 MMHG | TEMPERATURE: 99 F | RESPIRATION RATE: 20 BRPM | BODY MASS INDEX: 35.36 KG/M2 | OXYGEN SATURATION: 100 % | DIASTOLIC BLOOD PRESSURE: 83 MMHG | WEIGHT: 220 LBS | HEIGHT: 66 IN

## 2024-06-22 PROCEDURE — 99900041 HC LEFT WITHOUT BEING SEEN- EMERGENCY

## 2024-06-22 NOTE — ED PROVIDER NOTES
Encounter Date: 2024       History     Chief Complaint   Patient presents with    Shoulder Pain     R shoulder pain after trip and fall on rug at work 3 days ago. Denies LOC.     PATIENT ELOPED, I DID NOT SEE PATIENT.        Review of patient's allergies indicates:   Allergen Reactions    Trulicity [dulaglutide] Rash     Past Medical History:   Diagnosis Date    Abdominal pain     RIGHT MID ABD    Anemia     Anemia due to chronic blood loss 2020    Anxiety     Arthritis     Complex cyst of right ovary 2019    Diabetes mellitus     Diabetes mellitus, type 2     Encounter for blood transfusion     2    Encounter for well woman exam with routine gynecological exam 2019    Hypertension     Menorrhagia with regular cycle 2019    Menorrhagia with regular cycle 2020    Microcytic anemia 2020    Obesity     Ovarian cyst     right    Problem involving surgical incision 2019    Sinus congestion     Thyroid disease      Past Surgical History:   Procedure Laterality Date     SECTION      LAPAROSCOPIC SALPINGO-OOPHORECTOMY Bilateral 7/3/2019    Procedure: SALPINGO-OOPHORECTOMY, LAPAROSCOPIC;  Surgeon: Darin Hale MD;  Location: [facility] OR;  Service: OB/GYN;  Laterality: Bilateral;    LAPAROSCOPIC TOTAL HYSTERECTOMY N/A 7/3/2019    Procedure: HYSTERECTOMY, TOTAL, LAPAROSCOPIC;  Surgeon: Darin Hale MD;  Location: [facility] OR;  Service: OB/GYN;  Laterality: N/A;    OPEN REDUCTION AND INTERNAL FIXATION (ORIF) OF INJURY OF ANKLE Left     THYROIDECTOMY, PARTIAL      TUBAL LIGATION       Family History   Problem Relation Name Age of Onset    Cancer Mother      Hypertension Mother      Diabetes Mother      Arthritis Mother      Heart disease Mother      Kidney disease Mother      Cancer Father      Breast cancer Sister  58     Social History     Tobacco Use    Smoking status: Never    Smokeless tobacco: Never   Substance Use Topics    Alcohol use: No    Drug use: No     Review of  Systems    Physical Exam     Initial Vitals   BP Pulse Resp Temp SpO2   06/22/24 0934 06/22/24 0932 06/22/24 0932 06/22/24 0932 06/22/24 0932   (!) 160/83 75 20 98.6 °F (37 °C) 100 %      MAP       --                Physical Exam    ED Course   Procedures  Labs Reviewed - No data to display       Imaging Results    None          Medications - No data to display  Medical Decision Making                                              Rylan Laird MD  06/22/24 0933

## 2024-07-03 ENCOUNTER — HOSPITAL ENCOUNTER (EMERGENCY)
Facility: HOSPITAL | Age: 49
Discharge: HOME | End: 2024-07-03
Attending: EMERGENCY MEDICINE
Payer: MEDICAID

## 2024-07-03 VITALS
SYSTOLIC BLOOD PRESSURE: 178 MMHG | HEIGHT: 66 IN | BODY MASS INDEX: 33.75 KG/M2 | DIASTOLIC BLOOD PRESSURE: 84 MMHG | RESPIRATION RATE: 16 BRPM | TEMPERATURE: 98 F | OXYGEN SATURATION: 100 % | HEART RATE: 88 BPM | WEIGHT: 210 LBS

## 2024-07-03 DIAGNOSIS — K02.9 DENTAL CARIES: ICD-10-CM

## 2024-07-03 DIAGNOSIS — K04.01 ACUTE PULPITIS: ICD-10-CM

## 2024-07-03 DIAGNOSIS — K08.89 PAIN, DENTAL: Primary | ICD-10-CM

## 2024-07-03 DIAGNOSIS — M54.50 RIGHT LOW BACK PAIN, UNSPECIFIED CHRONICITY, UNSPECIFIED WHETHER SCIATICA PRESENT: ICD-10-CM

## 2024-07-03 PROCEDURE — 99284 EMERGENCY DEPT VISIT MOD MDM: CPT

## 2024-07-03 RX ORDER — PENICILLIN V POTASSIUM 500 MG/1
500 TABLET, FILM COATED ORAL EVERY 6 HOURS
Qty: 40 TABLET | Refills: 0 | Status: SHIPPED | OUTPATIENT
Start: 2024-07-03 | End: 2024-07-13

## 2024-07-03 RX ORDER — KETOROLAC TROMETHAMINE 10 MG/1
10 TABLET, FILM COATED ORAL EVERY 6 HOURS
Qty: 20 TABLET | Refills: 0 | Status: SHIPPED | OUTPATIENT
Start: 2024-07-03 | End: 2024-07-08

## 2024-07-03 RX ORDER — METHOCARBAMOL 500 MG/1
500 TABLET, FILM COATED ORAL 4 TIMES DAILY PRN
Qty: 30 TABLET | Refills: 0 | Status: SHIPPED | OUTPATIENT
Start: 2024-07-03

## 2024-07-03 RX ORDER — CHLORHEXIDINE GLUCONATE ORAL RINSE 1.2 MG/ML
15 SOLUTION DENTAL 2 TIMES DAILY
Qty: 100 ML | Refills: 0 | Status: SHIPPED | OUTPATIENT
Start: 2024-07-03

## 2024-07-03 NOTE — DISCHARGE INSTRUCTIONS
Take the medications as prescribed. Return for any worsening or new symptoms. Follow up with dentist

## 2024-07-03 NOTE — ED PROVIDER NOTES
CHIEF COMPLAINT  Chief Complaint   Patient presents with    Dental Pain     Pt. C/o left lower dental pain x 4-5 days.       HISTORY OF PRESENT ILLNESS  Haydee Allen is a 49 y.o. female pmh of HTN, DM presenting with left side upper lower dental pain. She states she was trying to get a dental appointment, waiting on call back. Pain started 4-5 days ago, got worse overnight with mild swelling on her face. She denies sore throat or fever.  No other specific aggravating or relieving factors otherwise.      PAST MEDICAL HISTORY  Past Medical History:   Diagnosis Date    Abdominal pain     RIGHT MID ABD    Anemia     Anemia due to chronic blood loss 6/25/2020    Anxiety     Arthritis     Complex cyst of right ovary 6/17/2019    Diabetes mellitus     Diabetes mellitus, type 2     Encounter for blood transfusion     2    Encounter for well woman exam with routine gynecological exam 5/13/2019    Hypertension     Menorrhagia with regular cycle 6/17/2019    Menorrhagia with regular cycle 6/25/2020    Microcytic anemia 6/25/2020    Obesity     Ovarian cyst     right    Problem involving surgical incision 7/18/2019    Sinus congestion     Thyroid disease        CURRENT MEDICATIONS    No current facility-administered medications for this encounter.    Current Outpatient Medications:     amLODIPine (NORVASC) 10 MG tablet, Take 1 tablet (10 mg total) by mouth once daily., Disp: 30 tablet, Rfl: 5    atorvastatin (LIPITOR) 10 MG tablet, Take 1 tablet (10 mg total) by mouth once daily., Disp: 30 tablet, Rfl: 5    blood sugar diagnostic (TRUE METRIX GLUCOSE TEST STRIP MISC), True Metrix Glucose Test Strip  Use twice daily as directed, Disp: , Rfl:     butalbital-acetaminophen-caffeine -40 mg (FIORICET, ESGIC) -40 mg per tablet, Take 1 tablet by mouth every 4 (four) hours as needed., Disp: , Rfl:     chlorhexidine (PERIDEX) 0.12 % solution, Use as directed 15 mLs in the mouth or throat 2 (two) times daily. Swish and  "spit, Disp: 100 mL, Rfl: 0    docusate sodium (COLACE) 100 MG capsule, Stool Softener 100 mg capsule, Disp: , Rfl:     ferrous sulfate 325 (65 FE) MG EC tablet, Take 325 mg by mouth 3 (three) times daily with meals., Disp: , Rfl:     flash glucose scanning reader (FREESTYLE TANGELA 14 DAY READER) Misc, Use with freestyle tangela to monitor glucose, Disp: 1 each, Rfl: 0    flash glucose sensor (FREESTYLE TANGELA 2 SENSOR) Kit, 1 Device by Misc.(Non-Drug; Combo Route) route every 14 (fourteen) days., Disp: 6 kit, Rfl: 0    hydroCHLOROthiazide (HYDRODIURIL) 12.5 MG Tab, Take 1 tablet (12.5 mg total) by mouth once daily., Disp: 90 tablet, Rfl: 1    ketorolac (TORADOL) 10 mg tablet, Take 1 tablet (10 mg total) by mouth every 6 (six) hours. for 5 days, Disp: 20 tablet, Rfl: 0    levothyroxine (SYNTHROID) 200 MCG tablet, Take 1 tablet (200 mcg total) by mouth before breakfast. Take one extra tablet (400 mcg total) on Sundays., Disp: 30 tablet, Rfl: 2    linaCLOtide (LINZESS) 72 mcg Cap capsule, Take 72 mcg by mouth before breakfast., Disp: , Rfl:     lisinopriL (PRINIVIL,ZESTRIL) 40 MG tablet, Take 1 tablet (40 mg total) by mouth once daily., Disp: 90 tablet, Rfl: 1    meloxicam (MOBIC) 15 MG tablet, Take 1 tablet (15 mg total) by mouth daily as needed for Pain., Disp: 14 tablet, Rfl: 0    metFORMIN (GLUCOPHAGE-XR) 500 MG ER 24hr tablet, Take 1 tablet (500 mg total) by mouth 2 (two) times daily with meals., Disp: 60 tablet, Rfl: 0    methocarbamoL (ROBAXIN) 500 MG Tab, Take 1 tablet (500 mg total) by mouth 4 (four) times daily as needed (back pain)., Disp: 30 tablet, Rfl: 0    pen needle, diabetic (BD CINDY 2ND GEN PEN NEEDLE) 32 gauge x 5/32" Ndle, Inject 1 Box into the skin 2 (two) times a day., Disp: 100 each, Rfl: 2    penicillin v potassium (VEETID) 500 MG tablet, Take 1 tablet (500 mg total) by mouth every 6 (six) hours. for 10 days, Disp: 40 tablet, Rfl: 0    Facility-Administered Medications Ordered in Other Encounters: "     lidocaine (PF) 10 mg/ml (1%) injection 2 mg, 0.2 mL, Intradermal, Once, Brenda Hauser MD    ALLERGIES    Review of patient's allergies indicates:   Allergen Reactions    Trulicity [dulaglutide] Rash       SURGICAL HISTORY    Past Surgical History:   Procedure Laterality Date     SECTION      LAPAROSCOPIC SALPINGO-OOPHORECTOMY Bilateral 7/3/2019    Procedure: SALPINGO-OOPHORECTOMY, LAPAROSCOPIC;  Surgeon: Darin Hale MD;  Location: [facility] OR;  Service: OB/GYN;  Laterality: Bilateral;    LAPAROSCOPIC TOTAL HYSTERECTOMY N/A 7/3/2019    Procedure: HYSTERECTOMY, TOTAL, LAPAROSCOPIC;  Surgeon: Darin Hale MD;  Location: [facility] OR;  Service: OB/GYN;  Laterality: N/A;    OPEN REDUCTION AND INTERNAL FIXATION (ORIF) OF INJURY OF ANKLE Left     THYROIDECTOMY, PARTIAL      TUBAL LIGATION         SOCIAL HISTORY    Social History     Socioeconomic History    Marital status: Single   Tobacco Use    Smoking status: Never    Smokeless tobacco: Never   Substance and Sexual Activity    Alcohol use: No    Drug use: No    Sexual activity: Never     Social Determinants of Health     Financial Resource Strain: High Risk (2021)    Overall Financial Resource Strain (CARDIA)     Difficulty of Paying Living Expenses: Hard   Food Insecurity: Food Insecurity Present (2021)    Hunger Vital Sign     Worried About Running Out of Food in the Last Year: Often true     Ran Out of Food in the Last Year: Sometimes true   Transportation Needs: Unmet Transportation Needs (2021)    PRAPARE - Transportation     Lack of Transportation (Medical): Yes     Lack of Transportation (Non-Medical): Yes   Physical Activity: Unknown (2021)    Exercise Vital Sign     Days of Exercise per Week: 1 day   Stress: Stress Concern Present (2021)    [employer] - Occupational Stress Questionnaire     Feeling of Stress : Very much       FAMILY HISTORY    Family History   Problem Relation Name Age of  "Onset    Cancer Mother      Hypertension Mother      Diabetes Mother      Arthritis Mother      Heart disease Mother      Kidney disease Mother      Cancer Father      Breast cancer Sister  58       REVIEW OF SYSTEMS    Review of Systems   HENT:          Dental pain     All other systems reviewed and are negative    VITAL SIGNS:   BP (!) 178/84 (BP Location: Left arm, Patient Position: Sitting)   Pulse 88   Temp 98.2 °F (36.8 °C) (Oral)   Resp 16   Ht 5' 6" (1.676 m)   Wt 95.3 kg (210 lb)   LMP 06/05/2019   SpO2 100%   Breastfeeding No   BMI 33.89 kg/m²      Physical Exam  Constitutional:       Appearance: Normal appearance. She is obese.   HENT:      Head: Normocephalic.      Right Ear: Tympanic membrane normal.      Left Ear: Tympanic membrane normal.      Mouth/Throat:      Dentition: Abnormal dentition. Dental tenderness, gingival swelling and dental caries present.      Pharynx: Oropharynx is clear.     Cardiovascular:      Rate and Rhythm: Normal rate.   Pulmonary:      Effort: Pulmonary effort is normal. No respiratory distress.      Breath sounds: Normal breath sounds.   Abdominal:      Palpations: Abdomen is soft.   Musculoskeletal:         General: Normal range of motion.   Skin:     General: Skin is warm.      Capillary Refill: Capillary refill takes less than 2 seconds.   Neurological:      General: No focal deficit present.      Mental Status: She is alert.      GCS: GCS eye subscore is 4. GCS verbal subscore is 5. GCS motor subscore is 6.   Psychiatric:         Attention and Perception: Attention normal.         Mood and Affect: Mood normal.         Speech: Speech normal.       Vitals and nursing note reviewed.     LABS    Labs Reviewed - No data to display      EKG          RADIOLOGY    No orders to display         PROCEDURES    Procedures    Medications - No data to display             Medical Decision Making  Haydee Allen is a 49 y.o. female pmh of HTN, DM presenting with left side " upper lower dental pain. She states she was trying to get a dental appointment, waiting on call back. Pain started 4-5 days ago, got worse overnight with mild swelling on her face. She denies sore throat or fever.  No other specific aggravating or relieving factors otherwise.    DDX: AOM, mastoiditis, and meningitis, Dental pain, dental abscess, dental caries, periodontal disease, gingivitis,Ismael's angina or retropharyngeal abscess.   Afebrile. Patient is non-toxic appearing and in no acute distress. Presentation most consistent with acute pulpitis. No evidence of facial cellulitis or visible/drainable abscess at this time. No sinus component. No evidence of oropharyngeal edema, swelling, tonsillar exudates, uvula swelling, uvula deviation, or changes in phonation to suggest strep throat or PTA. No evidence of Ismael's angina or retropharyngeal abscess. Tolerating PO.   Will discharge patient home with antibiotics and supportive care. Instructed the patient to have timely follow up with dental services for further evaluation and management of his/her symptoms.         Problems Addressed:  Acute pulpitis: acute illness or injury  Dental caries: acute illness or injury  Pain, dental: acute illness or injury  Right low back pain, unspecified chronicity, unspecified whether sciatica present: chronic illness or injury with exacerbation, progression, or side effects of treatment    Risk  Prescription drug management.           Discharge Medication List as of 7/3/2024 12:01 PM        STOP taking these medications       doxycycline (VIBRAMYCIN) 100 MG Cap Comments:   Reason for Stopping:               Discharge Medication List as of 7/3/2024 12:01 PM        START taking these medications    Details   chlorhexidine (PERIDEX) 0.12 % solution Use as directed 15 mLs in the mouth or throat 2 (two) times daily. Swish and spit, Starting Wed 7/3/2024, Normal      ketorolac (TORADOL) 10 mg tablet Take 1 tablet (10 mg total) by  mouth every 6 (six) hours. for 5 days, Starting Wed 7/3/2024, Until Mon 7/8/2024, Normal      methocarbamoL (ROBAXIN) 500 MG Tab Take 1 tablet (500 mg total) by mouth 4 (four) times daily as needed (back pain)., Starting Wed 7/3/2024, Normal      penicillin v potassium (VEETID) 500 MG tablet Take 1 tablet (500 mg total) by mouth every 6 (six) hours. for 10 days, Starting Wed 7/3/2024, Until Sat 7/13/2024, Normal             I discussed with patient that evaluation in the ED does not suggest any emergent or life threatening medical condition requiring immediate intervention beyond what was provided in the ED, and I believe the patient is safe for discharge.  Regardless, an unremarkable evaluation in the ED does not preclude the development or presence of a serious or life threatening condition. As such, patient was instructed to return immediately for any worsening or change in current symptoms  Patient agrees with plan of care.    DISPOSITION  Patient discharged to home in stable condition.        FINAL IMPRESSION    1. Pain, dental    2. Acute pulpitis    3. Dental caries    4. Right low back pain, unspecified chronicity, unspecified whether sciatica present         Scott Watson NP  07/03/24 0852

## 2024-08-30 ENCOUNTER — HOSPITAL ENCOUNTER (EMERGENCY)
Facility: HOSPITAL | Age: 49
Discharge: HOME | End: 2024-08-30
Attending: STUDENT IN AN ORGANIZED HEALTH CARE EDUCATION/TRAINING PROGRAM
Payer: MEDICAID

## 2024-08-30 VITALS
DIASTOLIC BLOOD PRESSURE: 76 MMHG | WEIGHT: 230 LBS | HEIGHT: 66 IN | HEART RATE: 84 BPM | SYSTOLIC BLOOD PRESSURE: 161 MMHG | OXYGEN SATURATION: 98 % | TEMPERATURE: 98 F | BODY MASS INDEX: 36.96 KG/M2 | RESPIRATION RATE: 16 BRPM

## 2024-08-30 DIAGNOSIS — S23.8XXA SPRAIN OF CHEST WALL, INITIAL ENCOUNTER: Primary | ICD-10-CM

## 2024-08-30 DIAGNOSIS — R07.89 CHEST WALL PAIN: ICD-10-CM

## 2024-08-30 LAB
OHS QRS DURATION: 84 MS
OHS QTC CALCULATION: 469 MS

## 2024-08-30 PROCEDURE — 71046 X-RAY EXAM CHEST 2 VIEWS: CPT | Mod: 26,,, | Performed by: RADIOLOGY

## 2024-08-30 PROCEDURE — 71046 X-RAY EXAM CHEST 2 VIEWS: CPT | Mod: TC

## 2024-08-30 PROCEDURE — 93005 ELECTROCARDIOGRAM TRACING: CPT

## 2024-08-30 PROCEDURE — 63600175 PHARM REV CODE 636 W HCPCS: Performed by: STUDENT IN AN ORGANIZED HEALTH CARE EDUCATION/TRAINING PROGRAM

## 2024-08-30 PROCEDURE — 99284 EMERGENCY DEPT VISIT MOD MDM: CPT | Mod: 25

## 2024-08-30 PROCEDURE — 93010 ELECTROCARDIOGRAM REPORT: CPT | Mod: ,,, | Performed by: INTERNAL MEDICINE

## 2024-08-30 PROCEDURE — 96372 THER/PROPH/DIAG INJ SC/IM: CPT | Performed by: STUDENT IN AN ORGANIZED HEALTH CARE EDUCATION/TRAINING PROGRAM

## 2024-08-30 RX ORDER — KETOROLAC TROMETHAMINE 30 MG/ML
30 INJECTION, SOLUTION INTRAMUSCULAR; INTRAVENOUS
Status: COMPLETED | OUTPATIENT
Start: 2024-08-30 | End: 2024-08-30

## 2024-08-30 RX ORDER — LIDOCAINE 50 MG/G
1 PATCH TOPICAL DAILY
Qty: 10 PATCH | Refills: 0 | Status: SHIPPED | OUTPATIENT
Start: 2024-08-30 | End: 2024-09-09

## 2024-08-30 RX ORDER — KETOROLAC TROMETHAMINE 10 MG/1
10 TABLET, FILM COATED ORAL EVERY 6 HOURS PRN
Qty: 20 TABLET | Refills: 0 | Status: SHIPPED | OUTPATIENT
Start: 2024-08-30 | End: 2024-09-04

## 2024-08-30 RX ADMIN — KETOROLAC TROMETHAMINE 30 MG: 30 INJECTION, SOLUTION INTRAMUSCULAR; INTRAVENOUS at 09:08

## 2024-08-30 NOTE — Clinical Note
"Haydee Frankjolynn Allen was seen and treated in our emergency department on 8/30/2024.  She may return to work on 09/06/2024.       If you have any questions or concerns, please don't hesitate to call.      Rylan Laird MD"

## 2024-08-30 NOTE — ED NOTES
Patient received for discharge.  Patient is awake, alert, and oriented x 4.  Speech clear and appropriate.  RR regular and non labored.  Skin W/D/P.  Given written and verbal discharge instruction, with verbal understanding.  Patient given the opportunity to ask questions, with answers to meet needs.  No complaints or noted concerns.  Declined discharge vitals and declined to stay in ED for 15 minutes post injection despite education.  Ambulatory with steady gait out of ED.

## 2024-08-30 NOTE — ED NOTES
See triage notes.     Pain:  Rated 9/10.     Psychosocial:  Patient is calm and cooperative.  Patients insight and judgement are appropriate to situation.  Appears clean, well maintained, with clothing appropriate to environment.  No evidence of delusions, hallucinations, or psychosis.     Neuro:  Eyes open spontaneously.  Awake, alert, oriented x 4.  Speech clear and appropriate.  Tolerating saliva secretions well.  Able to follow commands, demonstrating ability to actively and appropriately communicate within context of current conversation.  Symmetrical facial muscles.  Moving all extremities well with no noted weakness.  Adequate muscle tone present.  Movement is purposeful.      Airway:  Bilateral chest rise and fall.  RR regular and non-labored.  Air entry patent and clear x 5 lobes of the lungs.  No crepitus or subcutaneous emphysema noted on palpation.       Circulatory:  Skin warm, dry, and pink.  Apical and radial pulses strong and regular.  Capillary refill/skin blanching less than 3 seconds to distal of 4 extremities.     Skin:  Intact with no bruising/discolorations noted.

## 2024-08-30 NOTE — DISCHARGE INSTRUCTIONS
Take medications as prescribed  Increased rest  Avoid lifting heavy objects  May return to the ED at any time if any new or worsening symptoms

## 2024-08-30 NOTE — ED PROVIDER NOTES
Encounter Date: 2024       History     Chief Complaint   Patient presents with    Chest Pain     Patient reports feeling a pop to right lateral chest wall after bending last week.  Pain constant since with increased pain to move and palpate.  Patient reports she would like a chest x-ray.  Taking motrin and applying ice to the area since incident.       49-year-old female significant history below presents to ED with chief complaints right lateral wall chest pain. She tells me she heard a pop when she bent over to pick her phone from her car prior to onset of symptoms. Pain is reproducible to palpation, and position changes. She has tried NSAIDs, heating patch to no avail.    The history is provided by the patient. No  was used.     Review of patient's allergies indicates:   Allergen Reactions    Trulicity [dulaglutide] Rash     Past Medical History:   Diagnosis Date    Abdominal pain     RIGHT MID ABD    Anemia     Anemia due to chronic blood loss 2020    Anxiety     Arthritis     Complex cyst of right ovary 2019    Diabetes mellitus     Diabetes mellitus, type 2     Encounter for blood transfusion     2    Encounter for well woman exam with routine gynecological exam 2019    Hypertension     Menorrhagia with regular cycle 2019    Menorrhagia with regular cycle 2020    Microcytic anemia 2020    Obesity     Ovarian cyst     right    Problem involving surgical incision 2019    Sinus congestion     Thyroid disease      Past Surgical History:   Procedure Laterality Date     SECTION      LAPAROSCOPIC SALPINGO-OOPHORECTOMY Bilateral 7/3/2019    Procedure: SALPINGO-OOPHORECTOMY, LAPAROSCOPIC;  Surgeon: Darin Hale MD;  Location: [facility] OR;  Service: OB/GYN;  Laterality: Bilateral;    LAPAROSCOPIC TOTAL HYSTERECTOMY N/A 7/3/2019    Procedure: HYSTERECTOMY, TOTAL, LAPAROSCOPIC;  Surgeon: Darin Hale MD;  Location: [facility] OR;  Service: OB/GYN;   Laterality: N/A;    OPEN REDUCTION AND INTERNAL FIXATION (ORIF) OF INJURY OF ANKLE Left     THYROIDECTOMY, PARTIAL      TUBAL LIGATION       Family History   Problem Relation Name Age of Onset    Cancer Mother      Hypertension Mother      Diabetes Mother      Arthritis Mother      Heart disease Mother      Kidney disease Mother      Cancer Father      Breast cancer Sister  58     Social History     Tobacco Use    Smoking status: Never    Smokeless tobacco: Never   Substance Use Topics    Alcohol use: No    Drug use: No     Review of Systems   Constitutional: Negative.    HENT: Negative.     Eyes: Negative.    Respiratory: Negative.     Cardiovascular:         Chest wall pain   Gastrointestinal: Negative.    Endocrine: Negative.    Genitourinary: Negative.    Musculoskeletal:  Positive for myalgias.   Skin: Negative.    Allergic/Immunologic: Negative.    Neurological: Negative.    Hematological: Negative.    Psychiatric/Behavioral: Negative.     All other systems reviewed and are negative.      Physical Exam     Initial Vitals [08/30/24 0801]   BP Pulse Resp Temp SpO2   (!) 161/76 84 16 98 °F (36.7 °C) 98 %      MAP       --         Physical Exam    Nursing note and vitals reviewed.  Constitutional: She appears well-developed and well-nourished.   HENT:   Head: Normocephalic.   Eyes: Pupils are equal, round, and reactive to light.   Neck: No JVD present.   Normal range of motion.  Cardiovascular:  Normal rate.           Pulmonary/Chest: Breath sounds normal. No respiratory distress. She has no wheezes. She has no rhonchi. She has no rales. She exhibits no tenderness.   TTP lateral right lateral rib, normal overlying skin   Abdominal: Bowel sounds are normal. She exhibits no distension.   Musculoskeletal:      Cervical back: Normal range of motion.     Neurological: She is alert and oriented to person, place, and time. She has normal strength. No cranial nerve deficit. GCS score is 15. GCS eye subscore is 4. GCS  verbal subscore is 5. GCS motor subscore is 6.   Skin: Skin is warm. Capillary refill takes less than 2 seconds.   Psychiatric: She has a normal mood and affect.         ED Course   Procedures  Labs Reviewed - No data to display       Imaging Results              X-Ray Chest PA And Lateral (Final result)  Result time 08/30/24 09:11:48      Final result by Alanna Chin MD (08/30/24 09:11:48)                   Impression:      No acute cardiopulmonary disease.      Electronically signed by: Alanna Chin  Date:    08/30/2024  Time:    09:11               Narrative:    EXAMINATION:  XR CHEST PA AND LATERAL    CLINICAL HISTORY:  Other chest pain    TECHNIQUE:  PA and lateral views of the chest were performed.    COMPARISON:  04/20/2020    FINDINGS:  There is mild thoracic dextroscoliosis.  Cardiomediastinal silhouette within normal limits.  Lungs clear.  No pleural effusion.                                       Medications   ketorolac injection 30 mg (has no administration in time range)     Medical Decision Making  Ddx ribs sprain, fracture, contusion, pneumothorax, pneumonia, ACS others  EKG shows normal sinus rhythm, no STEMI  Chest x-ray shows no acute findings  Patient was seen and reevaluated. Patient's symptoms seem to be stable. I discussed the patient's diagnosis, treatment plan, and plan for discharge with the patient. Patient was instructed to follow up with PCP and was given strict return precautions to the ED. The patient voiced understanding and agreed with the plan          Amount and/or Complexity of Data Reviewed  Radiology: ordered.                                      Clinical Impression:  Final diagnoses:  [R07.89] Chest wall pain  [S23.8XXA] Sprain of chest wall, initial encounter (Primary)          ED Disposition Condition    Discharge Stable          ED Prescriptions       Medication Sig Dispense Start Date End Date Auth. Provider    LIDOcaine (LIDODERM) 5 % Place 1 patch onto the  skin once daily. Remove & Discard patch within 12 hours or as directed by MD for 10 days 10 patch 8/30/2024 9/9/2024 Rylan Laird MD    ketorolac (TORADOL) 10 mg tablet Take 1 tablet (10 mg total) by mouth every 6 (six) hours as needed for Pain. 20 tablet 8/30/2024 9/4/2024 Rylan Laird MD          Follow-up Information       Follow up With Specialties Details Why Contact Info    Lila Smart, NP Family Medicine  As needed [address]  [address]  [facility] [address]  [phone]               Rylan Laird MD  08/30/24 0916

## 2024-09-17 DIAGNOSIS — E03.9 ACQUIRED HYPOTHYROIDISM: ICD-10-CM

## 2024-09-17 DIAGNOSIS — I10 ESSENTIAL HYPERTENSION: ICD-10-CM

## 2024-09-18 ENCOUNTER — OCCUPATIONAL HEALTH (OUTPATIENT)
Dept: URGENT CARE | Facility: CLINIC | Age: 49
End: 2024-09-18

## 2024-09-18 PROCEDURE — 86580 TB INTRADERMAL TEST: CPT | Mod: S$GLB,,, | Performed by: EMERGENCY MEDICINE

## 2024-09-18 RX ORDER — AMLODIPINE BESYLATE 10 MG/1
10 TABLET ORAL
Qty: 30 TABLET | Refills: 0 | Status: SHIPPED | OUTPATIENT
Start: 2024-09-18

## 2024-09-18 RX ORDER — LEVOTHYROXINE SODIUM 200 UG/1
TABLET ORAL
Qty: 30 TABLET | Refills: 0 | Status: SHIPPED | OUTPATIENT
Start: 2024-09-18

## 2024-10-07 ENCOUNTER — HOSPITAL ENCOUNTER (EMERGENCY)
Facility: HOSPITAL | Age: 49
Discharge: HOME | End: 2024-10-07
Attending: EMERGENCY MEDICINE
Payer: MEDICAID

## 2024-10-07 VITALS
HEIGHT: 66 IN | OXYGEN SATURATION: 100 % | TEMPERATURE: 99 F | BODY MASS INDEX: 36.96 KG/M2 | DIASTOLIC BLOOD PRESSURE: 73 MMHG | SYSTOLIC BLOOD PRESSURE: 161 MMHG | RESPIRATION RATE: 17 BRPM | WEIGHT: 230 LBS | HEART RATE: 68 BPM

## 2024-10-07 DIAGNOSIS — D50.9 IRON DEFICIENCY ANEMIA, UNSPECIFIED IRON DEFICIENCY ANEMIA TYPE: ICD-10-CM

## 2024-10-07 DIAGNOSIS — D64.9 SYMPTOMATIC ANEMIA: Primary | ICD-10-CM

## 2024-10-07 LAB
ABO + RH BLD: NORMAL
ANION GAP SERPL CALC-SCNC: 14 MMOL/L (ref 8–16)
ANISOCYTOSIS BLD QL SMEAR: SLIGHT
BASOPHILS # BLD AUTO: 0.08 K/UL (ref 0–0.2)
BASOPHILS NFR BLD: 0.9 % (ref 0–1.9)
BLD GP AB SCN CELLS X3 SERPL QL: NORMAL
BLD PROD TYP BPU: NORMAL
BLD PROD TYP BPU: NORMAL
BLOOD UNIT EXPIRATION DATE: NORMAL
BLOOD UNIT EXPIRATION DATE: NORMAL
BLOOD UNIT TYPE CODE: 6200
BLOOD UNIT TYPE CODE: 6200
BLOOD UNIT TYPE: NORMAL
BLOOD UNIT TYPE: NORMAL
BUN SERPL-MCNC: 11 MG/DL (ref 6–20)
CALCIUM SERPL-MCNC: 9.2 MG/DL (ref 8.7–10.5)
CHLORIDE SERPL-SCNC: 98 MMOL/L (ref 95–110)
CO2 SERPL-SCNC: 24 MMOL/L (ref 23–29)
CODING SYSTEM: NORMAL
CODING SYSTEM: NORMAL
CREAT SERPL-MCNC: 1.1 MG/DL (ref 0.5–1.4)
CROSSMATCH INTERPRETATION: NORMAL
CROSSMATCH INTERPRETATION: NORMAL
DIFFERENTIAL METHOD BLD: ABNORMAL
DISPENSE STATUS: NORMAL
DISPENSE STATUS: NORMAL
EOSINOPHIL # BLD AUTO: 0.2 K/UL (ref 0–0.5)
EOSINOPHIL NFR BLD: 2.2 % (ref 0–8)
ERYTHROCYTE [DISTWIDTH] IN BLOOD BY AUTOMATED COUNT: 23.2 % (ref 11.5–14.5)
EST. GFR  (NO RACE VARIABLE): >60 ML/MIN/1.73 M^2
GLUCOSE SERPL-MCNC: 437 MG/DL (ref 70–110)
HCT VFR BLD AUTO: 23.4 % (ref 37–48.5)
HGB BLD-MCNC: 6 G/DL (ref 12–16)
HYPOCHROMIA BLD QL SMEAR: ABNORMAL
IMM GRANULOCYTES # BLD AUTO: 0.03 K/UL (ref 0–0.04)
IMM GRANULOCYTES NFR BLD AUTO: 0.3 % (ref 0–0.5)
LYMPHOCYTES # BLD AUTO: 2.2 K/UL (ref 1–4.8)
LYMPHOCYTES NFR BLD: 25.3 % (ref 18–48)
MCH RBC QN AUTO: 14 PG (ref 27–31)
MCHC RBC AUTO-ENTMCNC: 25.6 G/DL (ref 32–36)
MCV RBC AUTO: 54 FL (ref 82–98)
MONOCYTES # BLD AUTO: 0.5 K/UL (ref 0.3–1)
MONOCYTES NFR BLD: 5.7 % (ref 4–15)
NEUTROPHILS # BLD AUTO: 5.6 K/UL (ref 1.8–7.7)
NEUTROPHILS NFR BLD: 65.6 % (ref 38–73)
NRBC BLD-RTO: 0 /100 WBC
NUM UNITS TRANS PACKED RBC: NORMAL
NUM UNITS TRANS PACKED RBC: NORMAL
PLATELET # BLD AUTO: 277 K/UL (ref 150–450)
PLATELET BLD QL SMEAR: ABNORMAL
PMV BLD AUTO: ABNORMAL FL (ref 9.2–12.9)
POTASSIUM SERPL-SCNC: 3.7 MMOL/L (ref 3.5–5.1)
RBC # BLD AUTO: 4.3 M/UL (ref 4–5.4)
SODIUM SERPL-SCNC: 136 MMOL/L (ref 136–145)
SPECIMEN OUTDATE: NORMAL
WBC # BLD AUTO: 8.58 K/UL (ref 3.9–12.7)

## 2024-10-07 PROCEDURE — P9016 RBC LEUKOCYTES REDUCED: HCPCS | Performed by: EMERGENCY MEDICINE

## 2024-10-07 PROCEDURE — 96374 THER/PROPH/DIAG INJ IV PUSH: CPT

## 2024-10-07 PROCEDURE — 85025 COMPLETE CBC W/AUTO DIFF WBC: CPT | Performed by: NURSE PRACTITIONER

## 2024-10-07 PROCEDURE — 86901 BLOOD TYPING SEROLOGIC RH(D): CPT | Performed by: NURSE PRACTITIONER

## 2024-10-07 PROCEDURE — 25000003 PHARM REV CODE 250: Performed by: EMERGENCY MEDICINE

## 2024-10-07 PROCEDURE — 80048 BASIC METABOLIC PNL TOTAL CA: CPT | Performed by: NURSE PRACTITIONER

## 2024-10-07 PROCEDURE — 86922 COMPATIBILITY TEST ANTIGLOB: CPT | Performed by: EMERGENCY MEDICINE

## 2024-10-07 PROCEDURE — 63600175 PHARM REV CODE 636 W HCPCS: Performed by: EMERGENCY MEDICINE

## 2024-10-07 PROCEDURE — 36430 TRANSFUSION BLD/BLD COMPNT: CPT

## 2024-10-07 PROCEDURE — 86900 BLOOD TYPING SEROLOGIC ABO: CPT | Performed by: NURSE PRACTITIONER

## 2024-10-07 PROCEDURE — 36415 COLL VENOUS BLD VENIPUNCTURE: CPT | Performed by: NURSE PRACTITIONER

## 2024-10-07 PROCEDURE — 99285 EMERGENCY DEPT VISIT HI MDM: CPT | Mod: 25

## 2024-10-07 PROCEDURE — 86850 RBC ANTIBODY SCREEN: CPT | Performed by: NURSE PRACTITIONER

## 2024-10-07 RX ORDER — HYDROCODONE BITARTRATE AND ACETAMINOPHEN 500; 5 MG/1; MG/1
TABLET ORAL
Status: DISCONTINUED | OUTPATIENT
Start: 2024-10-07 | End: 2024-10-08 | Stop reason: HOSPADM

## 2024-10-07 RX ORDER — TIZANIDINE 4 MG/1
4 TABLET ORAL EVERY 6 HOURS PRN
COMMUNITY
Start: 2024-08-05

## 2024-10-07 RX ORDER — HYDRALAZINE HYDROCHLORIDE 20 MG/ML
20 INJECTION INTRAMUSCULAR; INTRAVENOUS
Status: COMPLETED | OUTPATIENT
Start: 2024-10-07 | End: 2024-10-07

## 2024-10-07 RX ORDER — HYDROXYZINE HYDROCHLORIDE 25 MG/1
1 TABLET, FILM COATED ORAL 3 TIMES DAILY PRN
COMMUNITY

## 2024-10-07 RX ORDER — ACETAMINOPHEN 325 MG/1
650 TABLET ORAL
Status: COMPLETED | OUTPATIENT
Start: 2024-10-07 | End: 2024-10-07

## 2024-10-07 RX ADMIN — ACETAMINOPHEN 650 MG: 325 TABLET ORAL at 10:10

## 2024-10-07 RX ADMIN — HYDRALAZINE HYDROCHLORIDE 10 MG: 20 INJECTION INTRAMUSCULAR; INTRAVENOUS at 11:10

## 2024-10-07 NOTE — DISCHARGE INSTRUCTIONS
Follow-up with the primary care physician tomorrow the next day for further evaluation and scheduling of your iron infusion.

## 2024-10-07 NOTE — ED PROVIDER NOTES
Encounter Date: 10/7/2024       History     Chief Complaint   Patient presents with    Abnormal Labs     Sent by PMD for blood and iron transfusion. Hx of anemia.     49-year-old female comes emergency complaints of anemia.  The patient states that she was feeling weak and dizzy upon standing.  The patient had outpatient labs performed which demonstrated a low hemoglobin hematocrit.  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  The patient has gotten off work and comes to the hospital emergency room for treatment of her anemia and her low hemoglobin hematocrit.  I spoke with her primary care physician who recommended 2 units of packed red blood cells which I readily agree with.  The patient is anemic primarily secondary to low iron.  She also has heavy menstruation.  She appears stable at this time.      Review of patient's allergies indicates:   Allergen Reactions    Trulicity [dulaglutide] Rash     Past Medical History:   Diagnosis Date    Abdominal pain     RIGHT MID ABD    Anemia     Anemia due to chronic blood loss 2020    Anxiety     Arthritis     Complex cyst of right ovary 2019    Diabetes mellitus     Diabetes mellitus, type 2     Encounter for blood transfusion     2    Encounter for well woman exam with routine gynecological exam 2019    Hypertension     Menorrhagia with regular cycle 2019    Menorrhagia with regular cycle 2020    Microcytic anemia 2020    Obesity     Ovarian cyst     right    Problem involving surgical incision 2019    Sinus congestion     Thyroid disease      Past Surgical History:   Procedure Laterality Date     SECTION      LAPAROSCOPIC SALPINGO-OOPHORECTOMY Bilateral 7/3/2019    Procedure: SALPINGO-OOPHORECTOMY, LAPAROSCOPIC;  Surgeon: Darin Hale MD;  Location: [facility];  Service: OB/GYN;  Laterality: Bilateral;    LAPAROSCOPIC TOTAL HYSTERECTOMY N/A 7/3/2019    Procedure: HYSTERECTOMY, TOTAL, LAPAROSCOPIC;  Surgeon: Darin STEWART  MD Geoffrey;  Location: [facility];  Service: OB/GYN;  Laterality: N/A;    OPEN REDUCTION AND INTERNAL FIXATION (ORIF) OF INJURY OF ANKLE Left     THYROIDECTOMY, PARTIAL      TUBAL LIGATION       Family History   Problem Relation Name Age of Onset    Cancer Mother      Hypertension Mother      Diabetes Mother      Arthritis Mother      Heart disease Mother      Kidney disease Mother      Cancer Father      Breast cancer Sister  58     Social History     Tobacco Use    Smoking status: Never    Smokeless tobacco: Never   Substance Use Topics    Alcohol use: No    Drug use: No     Review of Systems   Constitutional:  Negative for fever.   HENT:  Negative for sore throat.    Respiratory:  Negative for shortness of breath.    Cardiovascular:  Negative for chest pain.   Gastrointestinal:  Negative for nausea.   Genitourinary:  Positive for vaginal bleeding. Negative for dysuria.   Musculoskeletal:  Negative for back pain.   Skin:  Negative for rash.   Neurological:  Negative for weakness.   Hematological:  Does not bruise/bleed easily.   All other systems reviewed and are negative.      Physical Exam     Initial Vitals [10/07/24 1535]   BP Pulse Resp Temp SpO2   (!) 155/79 101 20 98.4 °F (36.9 °C) 97 %      MAP       --         Physical Exam    Nursing note and vitals reviewed.  Constitutional: She appears well-developed and well-nourished.   HENT:   Head: Normocephalic and atraumatic.   Eyes: Pupils are equal, round, and reactive to light.   Neck:   Normal range of motion.  Cardiovascular:  Normal rate, regular rhythm and normal heart sounds.           Pulmonary/Chest: Breath sounds normal.   Abdominal: Abdomen is soft. Bowel sounds are normal.   Musculoskeletal:         General: Normal range of motion.      Cervical back: Normal range of motion.     Neurological: She is alert and oriented to person, place, and time. She has normal strength. GCS score is 15. GCS eye subscore is 4. GCS verbal subscore is 5. GCS motor subscore  is 6.   Skin: Skin is warm and dry.   Psychiatric: She has a normal mood and affect. Her behavior is normal. Judgment and thought content normal.         ED Course   Procedures  Labs Reviewed   BASIC METABOLIC PANEL - Abnormal       Result Value    Sodium 136      Potassium 3.7      Chloride 98      CO2 24      Glucose 437 (*)     BUN 11      Creatinine 1.1      Calcium 9.2      Anion Gap 14      eGFR >60.0     CBC W/ AUTO DIFFERENTIAL - Abnormal    WBC 8.58      RBC 4.30      Hemoglobin 6.0 (*)     Hematocrit 23.4 (*)     MCV 54 (*)     MCH 14.0 (*)     MCHC 25.6 (*)     RDW 23.2 (*)     Platelets 277      MPV SEE COMMENT      Immature Granulocytes 0.3      Gran # (ANC) 5.6      Immature Grans (Abs) 0.03      Lymph # 2.2      Mono # 0.5      Eos # 0.2      Baso # 0.08      nRBC 0      Gran % 65.6      Lymph % 25.3      Mono % 5.7      Eosinophil % 2.2      Basophil % 0.9      Differential Method Automated     TYPE & SCREEN    Group & Rh A POS      Indirect Laverne NEG      Specimen Outdate 10/10/2024 23:59     PREPARE RBC SOFT          Imaging Results    None          Medications   0.9%  NaCl infusion (for blood administration) (has no administration in time range)     Medical Decision Making  Anemia, electrolyte abnormality, infection, sepsis, dehydration, urinate check infection, cancerous process, viral infection, hereditary hypokalemia syndrome.  CVA, TIA, hemorrhagic stroke, embolic stroke.      Amount and/or Complexity of Data Reviewed  Discussion of management or test interpretation with external provider(s): The patient is stable this time.  The patient is anemic.  Repeat laboratory values correlate with previous labs.  The patient will receive 2 units packed red blood cells.  This has been ordered.  Upon completion of the red blood cells the patient will be discharged home to follow up with the primary care physician.  She does not need an iron infusion.  She understands this and she will follow up with the  primary care physician to get this under fusion infused.  Discharge pending at this time.      Risk  Prescription drug management.                                      Clinical Impression:  Final diagnoses:  [D64.9] Symptomatic anemia (Primary)  [D50.9] Iron deficiency anemia, unspecified iron deficiency anemia type          ED Disposition Condition    Discharge Stable          ED Prescriptions    None       Follow-up Information    None          Maurilio Hurd MD  10/07/24 6145

## 2024-10-07 NOTE — Clinical Note
"Haydee Ramos"Alejandro was seen and treated in our emergency department on 10/7/2024.  She may return to work on 10/11/2024.       If you have any questions or concerns, please don't hesitate to call.       RN    "

## 2024-10-07 NOTE — ED NOTES
"1st contact with pt, bedside report received from nurse Keith, sitting on side of stretcher, leaning on bedside table, a&ox3, in no acute distress, playing with her phone, pt reports she was called by per PMD and informed " that she told my that my blood was low to get to the ER" pt reports she spoke to the ER doctor and made him aware, she does not want to be " in the hospital, I got bills to pay" pt reports the doctor has informed her she is getting " two units of blood" and then she will be going home, currently pt denies generalized weakness, denies HA, n/v/, pt reports she has had " like two or three" previous blood transfusion, denies blood transfusion reactions in the past, skin dry, warm, resp even non labored, skin dry, warm, rom intact to all extremities, food and drink offered, pt reports she ate and drank, ice chips requested and given   "

## 2024-10-08 NOTE — ED NOTES
A&Ox3, in no acute distress, resting quietly with eyes closed, skin dry, warm, resp even non labored, no adverse reaction to blood transfusion, no complaints, request for 2nd pillow and 2nd pillow provided, will continue to monitor, lights turned off per pt request, [employer] game on

## 2024-10-08 NOTE — ED NOTES
1st unit of PRBC completed, no adverse reaction to the transfusion, denies SOB/CP/n/v/back pain, no new complaints, no request at this time, made aware of 2nd unit pending transfusion, pt verbalized understanding

## 2024-10-08 NOTE — ED NOTES
A&Ox3, in no acute distress, skin dry, warm, resp even non labored, no adverse reaction to blood transfusion, no complaints, no request at this time, will continue to monitor

## 2024-10-08 NOTE — ED NOTES
Dr. Sorensen made aware, blood transfusion complete, bp of 188/86, per md monitor pt for 30mins if bp has not lowered, to notify provider

## 2024-10-08 NOTE — ED NOTES
Dr. Sorensen made aware of bp 191/85, md verbalized understanding, awaiting new orders     Pt denies CP/SOB

## 2024-10-08 NOTE — ED NOTES
Blood transfusion completed, no adverse reaction noted, no complaints, no request at this time, resp even non labored skin dry, warm,

## 2024-10-22 ENCOUNTER — HOSPITAL ENCOUNTER (OUTPATIENT)
Dept: RADIOLOGY | Facility: HOSPITAL | Age: 49
Discharge: HOME | End: 2024-10-22
Payer: MEDICAID

## 2024-10-22 DIAGNOSIS — M25.511 PAIN IN RIGHT SHOULDER: Primary | ICD-10-CM

## 2024-10-22 DIAGNOSIS — M25.511 PAIN IN RIGHT SHOULDER: ICD-10-CM

## 2024-10-22 PROCEDURE — 73030 X-RAY EXAM OF SHOULDER: CPT | Mod: 26,RT,, | Performed by: RADIOLOGY

## 2024-10-22 PROCEDURE — 73030 X-RAY EXAM OF SHOULDER: CPT | Mod: TC,RT

## 2024-11-12 ENCOUNTER — TELEPHONE (OUTPATIENT)
Dept: ORTHOPEDICS | Facility: CLINIC | Age: 49
End: 2024-11-12
Payer: MEDICAID

## 2024-11-12 NOTE — TELEPHONE ENCOUNTER
Spoke with pt regarding request to see Orthopedic for right hip.   Advised due to insurance she will need to be seen in [place].   Pt stated she works in [place] can she be seen in [place].   Advised no due to having [place] Medicaid.   Pt verbalized understanding and stated she will call back when able to schedule for [place] location. Advised we are only in [place] every other Wednesday and every Friday. Pt verbalized understanding.

## 2025-02-22 ENCOUNTER — HOSPITAL ENCOUNTER (EMERGENCY)
Facility: HOSPITAL | Age: 50
Discharge: HOME | End: 2025-02-22
Attending: STUDENT IN AN ORGANIZED HEALTH CARE EDUCATION/TRAINING PROGRAM
Payer: MEDICAID

## 2025-02-22 VITALS
TEMPERATURE: 99 F | HEART RATE: 85 BPM | BODY MASS INDEX: 32.44 KG/M2 | DIASTOLIC BLOOD PRESSURE: 68 MMHG | SYSTOLIC BLOOD PRESSURE: 119 MMHG | OXYGEN SATURATION: 99 % | RESPIRATION RATE: 20 BRPM | WEIGHT: 201 LBS

## 2025-02-22 DIAGNOSIS — M25.511 RIGHT SHOULDER PAIN: Primary | ICD-10-CM

## 2025-02-22 DIAGNOSIS — E87.6 HYPOKALEMIA: ICD-10-CM

## 2025-02-22 DIAGNOSIS — E11.65 HYPERGLYCEMIA DUE TO DIABETES MELLITUS: ICD-10-CM

## 2025-02-22 DIAGNOSIS — M25.522 LEFT ELBOW PAIN: ICD-10-CM

## 2025-02-22 LAB
ALBUMIN SERPL BCP-MCNC: 3.7 G/DL (ref 3.5–5.2)
ALLENS TEST: ABNORMAL
ALP SERPL-CCNC: 78 U/L (ref 40–150)
ALT SERPL W/O P-5'-P-CCNC: 12 U/L (ref 10–44)
ANION GAP SERPL CALC-SCNC: 14 MMOL/L (ref 8–16)
ANION GAP SERPL CALC-SCNC: 19 MMOL/L (ref 8–16)
AST SERPL-CCNC: 15 U/L (ref 10–40)
BACTERIA #/AREA URNS HPF: ABNORMAL /HPF
BASOPHILS # BLD AUTO: 0.03 K/UL (ref 0–0.2)
BASOPHILS NFR BLD: 0.2 % (ref 0–1.9)
BILIRUB SERPL-MCNC: 0.6 MG/DL (ref 0.1–1)
BILIRUB UR QL STRIP: NEGATIVE
BUN SERPL-MCNC: 7 MG/DL (ref 6–30)
BUN SERPL-MCNC: 9 MG/DL (ref 6–20)
CALCIUM SERPL-MCNC: 9.7 MG/DL (ref 8.7–10.5)
CHLORIDE SERPL-SCNC: 101 MMOL/L (ref 95–110)
CHLORIDE SERPL-SCNC: 103 MMOL/L (ref 95–110)
CLARITY UR: CLEAR
CO2 SERPL-SCNC: 21 MMOL/L (ref 23–29)
COLOR UR: YELLOW
CREAT SERPL-MCNC: 0.5 MG/DL (ref 0.5–1.4)
CREAT SERPL-MCNC: 0.8 MG/DL (ref 0.5–1.4)
CTP QC/QA: YES
CTP QC/QA: YES
DIFFERENTIAL METHOD BLD: ABNORMAL
EOSINOPHIL # BLD AUTO: 0 K/UL (ref 0–0.5)
EOSINOPHIL NFR BLD: 0.2 % (ref 0–8)
ERYTHROCYTE [DISTWIDTH] IN BLOOD BY AUTOMATED COUNT: 20.3 % (ref 11.5–14.5)
EST. GFR  (NO RACE VARIABLE): >60 ML/MIN/1.73 M^2
GLUCOSE SERPL-MCNC: 218 MG/DL (ref 70–110)
GLUCOSE SERPL-MCNC: 224 MG/DL (ref 70–110)
GLUCOSE UR QL STRIP: ABNORMAL
HCT VFR BLD AUTO: 27.7 % (ref 37–48.5)
HCT VFR BLD CALC: 30 %PCV (ref 36–54)
HGB BLD-MCNC: 8.2 G/DL (ref 12–16)
HGB UR QL STRIP: NEGATIVE
IMM GRANULOCYTES # BLD AUTO: 0.04 K/UL (ref 0–0.04)
IMM GRANULOCYTES NFR BLD AUTO: 0.3 % (ref 0–0.5)
KETONES UR QL STRIP: NEGATIVE
LEUKOCYTE ESTERASE UR QL STRIP: NEGATIVE
LYMPHOCYTES # BLD AUTO: 1 K/UL (ref 1–4.8)
LYMPHOCYTES NFR BLD: 8.1 % (ref 18–48)
MAGNESIUM SERPL-MCNC: 2 MG/DL (ref 1.6–2.6)
MCH RBC QN AUTO: 17.9 PG (ref 27–31)
MCHC RBC AUTO-ENTMCNC: 29.6 G/DL (ref 32–36)
MCV RBC AUTO: 61 FL (ref 82–98)
MICROSCOPIC COMMENT: ABNORMAL
MONOCYTES # BLD AUTO: 0.7 K/UL (ref 0.3–1)
MONOCYTES NFR BLD: 5.7 % (ref 4–15)
NEUTROPHILS # BLD AUTO: 10.7 K/UL (ref 1.8–7.7)
NEUTROPHILS NFR BLD: 85.5 % (ref 38–73)
NITRITE UR QL STRIP: NEGATIVE
NRBC BLD-RTO: 0 /100 WBC
OHS QRS DURATION: 92 MS
OHS QTC CALCULATION: 448 MS
PH UR STRIP: 6 [PH] (ref 5–8)
PLATELET # BLD AUTO: 130 K/UL (ref 150–450)
PMV BLD AUTO: ABNORMAL FL (ref 9.2–12.9)
POC IONIZED CALCIUM: 1.18 MMOL/L (ref 1.06–1.42)
POC MOLECULAR INFLUENZA A AGN: NEGATIVE
POC MOLECULAR INFLUENZA B AGN: NEGATIVE
POC TCO2 (MEASURED): 24 MMOL/L (ref 23–29)
POTASSIUM BLD-SCNC: 3.1 MMOL/L (ref 3.5–5.1)
POTASSIUM SERPL-SCNC: 3.2 MMOL/L (ref 3.5–5.1)
PROT SERPL-MCNC: 8.2 G/DL (ref 6–8.4)
PROT UR QL STRIP: ABNORMAL
RBC # BLD AUTO: 4.57 M/UL (ref 4–5.4)
RBC #/AREA URNS HPF: 5 /HPF (ref 0–4)
SAMPLE: ABNORMAL
SARS-COV-2 RDRP RESP QL NAA+PROBE: NEGATIVE
SITE: ABNORMAL
SODIUM BLD-SCNC: 139 MMOL/L (ref 136–145)
SODIUM SERPL-SCNC: 138 MMOL/L (ref 136–145)
SP GR UR STRIP: >1.03 (ref 1–1.03)
SQUAMOUS #/AREA URNS HPF: 7 /HPF
TROPONIN I SERPL DL<=0.01 NG/ML-MCNC: 0.01 NG/ML (ref 0–0.03)
URN SPEC COLLECT METH UR: ABNORMAL
UROBILINOGEN UR STRIP-ACNC: ABNORMAL EU/DL
WBC # BLD AUTO: 12.54 K/UL (ref 3.9–12.7)
WBC #/AREA URNS HPF: 5 /HPF (ref 0–5)
YEAST URNS QL MICRO: ABNORMAL

## 2025-02-22 PROCEDURE — 25000003 PHARM REV CODE 250: Performed by: PHYSICIAN ASSISTANT

## 2025-02-22 PROCEDURE — 63600175 PHARM REV CODE 636 W HCPCS: Mod: JZ,TB | Performed by: PHYSICIAN ASSISTANT

## 2025-02-22 PROCEDURE — 84484 ASSAY OF TROPONIN QUANT: CPT | Performed by: PHYSICIAN ASSISTANT

## 2025-02-22 PROCEDURE — 93010 ELECTROCARDIOGRAM REPORT: CPT | Mod: ,,, | Performed by: INTERNAL MEDICINE

## 2025-02-22 PROCEDURE — 84295 ASSAY OF SERUM SODIUM: CPT

## 2025-02-22 PROCEDURE — 85025 COMPLETE CBC W/AUTO DIFF WBC: CPT | Performed by: PHYSICIAN ASSISTANT

## 2025-02-22 PROCEDURE — 82330 ASSAY OF CALCIUM: CPT

## 2025-02-22 PROCEDURE — 96372 THER/PROPH/DIAG INJ SC/IM: CPT | Performed by: PHYSICIAN ASSISTANT

## 2025-02-22 PROCEDURE — 80048 BASIC METABOLIC PNL TOTAL CA: CPT | Mod: XB

## 2025-02-22 PROCEDURE — 93005 ELECTROCARDIOGRAM TRACING: CPT

## 2025-02-22 PROCEDURE — 99900035 HC TECH TIME PER 15 MIN (STAT)

## 2025-02-22 PROCEDURE — 82565 ASSAY OF CREATININE: CPT

## 2025-02-22 PROCEDURE — 85014 HEMATOCRIT: CPT

## 2025-02-22 PROCEDURE — 80053 COMPREHEN METABOLIC PANEL: CPT | Performed by: PHYSICIAN ASSISTANT

## 2025-02-22 PROCEDURE — 84132 ASSAY OF SERUM POTASSIUM: CPT

## 2025-02-22 PROCEDURE — 99285 EMERGENCY DEPT VISIT HI MDM: CPT | Mod: 25

## 2025-02-22 PROCEDURE — 81000 URINALYSIS NONAUTO W/SCOPE: CPT | Performed by: PHYSICIAN ASSISTANT

## 2025-02-22 PROCEDURE — 83735 ASSAY OF MAGNESIUM: CPT | Performed by: PHYSICIAN ASSISTANT

## 2025-02-22 PROCEDURE — 87502 INFLUENZA DNA AMP PROBE: CPT

## 2025-02-22 PROCEDURE — 87635 SARS-COV-2 COVID-19 AMP PRB: CPT | Performed by: PHYSICIAN ASSISTANT

## 2025-02-22 RX ORDER — METHOCARBAMOL 750 MG/1
1500 TABLET, FILM COATED ORAL 3 TIMES DAILY PRN
Qty: 30 TABLET | Refills: 0 | Status: SHIPPED | OUTPATIENT
Start: 2025-02-22 | End: 2025-02-27

## 2025-02-22 RX ORDER — KETOROLAC TROMETHAMINE 30 MG/ML
30 INJECTION, SOLUTION INTRAMUSCULAR; INTRAVENOUS
Status: COMPLETED | OUTPATIENT
Start: 2025-02-22 | End: 2025-02-22

## 2025-02-22 RX ADMIN — KETOROLAC TROMETHAMINE 30 MG: 30 INJECTION, SOLUTION INTRAMUSCULAR; INTRAVENOUS at 06:02

## 2025-02-22 RX ADMIN — POTASSIUM BICARBONATE 50 MEQ: 977.5 TABLET, EFFERVESCENT ORAL at 04:02

## 2025-02-22 NOTE — Clinical Note
"Haydee Ramos"Alejandro was seen and treated in our emergency department on 2/22/2025.  She may return to work on 02/24/2025.       If you have any questions or concerns, please don't hesitate to call.       RN    "

## 2025-02-22 NOTE — ED PROVIDER NOTES
Encounter Date: 2/22/2025       History     Chief Complaint   Patient presents with    Generalized Body Aches     Pt started working yesterday and has pain to rt hand, arm and legs     49-year-old female presents to ED complaining of atraumatic right shoulder pain, left elbow pain since yesterday evening after getting off of work.    Pt went to work for the first time in 4mo today; she works in the kitchen at our hospital; she states she worked from 6:30 a.m. until 7:00 p.m..  She states throughout the day she began with bilateral knee pain.  She states after work she began to develop pain to her right shoulder, to her left elbow.  She states pain has begun to radiate down into her entire right arm.  Limited active range of motion of her right shoulder secondary to discomfort.  Does admit to history of arthritis to her right shoulder, no previous injury or surgery to this region.  She states also with pain radiating from her left elbow into her left thumb; pain is worse with range of motion of her left elbow, limited range of motion secondary to discomfort.  Denies upper extremity swelling or obvious joint swelling.  No fever chills myalgias.  No traumas or injuries.  Denies neck pain or stiffness.  Denies history of cervical spine issues.  No upper extremity weakness.  No chest pain.  No shortness of breath.  No recent illness.  No cough.  No abdominal pain.  No nausea vomiting.  No new leg swelling or calf pain.  No exogenous estrogen.  No history of VTE.  No history of CHF.  No personal history of ACS.  No headache.  No photophobia.  No personal or family history of autoimmune disease.  Denies history of gouty arthritis.    Right-hand dominant.    Did not take her Lantus this evening due to severe pain to the right shoulder.    PMH:  HTN  HLD  MERARI  Hx blood transfusion  Hypothyroidism  IDDM  Diabetic peripheral neuropathy  Obesity  OA  Insomnia      Review of patient's allergies indicates:   Allergen Reactions     Trulicity [dulaglutide] Rash     Past Medical History:   Diagnosis Date    Abdominal pain     RIGHT MID ABD    Anemia     Anemia due to chronic blood loss 2020    Anxiety     Arthritis     Complex cyst of right ovary 2019    Diabetes mellitus     Diabetes mellitus, type 2     Encounter for blood transfusion     2    Encounter for well woman exam with routine gynecological exam 2019    Hypertension     Menorrhagia with regular cycle 2019    Menorrhagia with regular cycle 2020    Microcytic anemia 2020    Obesity     Ovarian cyst     right    Problem involving surgical incision 2019    Sinus congestion     Thyroid disease      Past Surgical History:   Procedure Laterality Date     SECTION      LAPAROSCOPIC SALPINGO-OOPHORECTOMY Bilateral 7/3/2019    Procedure: SALPINGO-OOPHORECTOMY, LAPAROSCOPIC;  Surgeon: Darin Hale MD;  Location: [facility] OR;  Service: OB/GYN;  Laterality: Bilateral;    LAPAROSCOPIC TOTAL HYSTERECTOMY N/A 7/3/2019    Procedure: HYSTERECTOMY, TOTAL, LAPAROSCOPIC;  Surgeon: Darin Hale MD;  Location: [facility] OR;  Service: OB/GYN;  Laterality: N/A;    OPEN REDUCTION AND INTERNAL FIXATION (ORIF) OF INJURY OF ANKLE Left     THYROIDECTOMY, PARTIAL      TUBAL LIGATION       Family History   Problem Relation Name Age of Onset    Cancer Mother      Hypertension Mother      Diabetes Mother      Arthritis Mother      Heart disease Mother      Kidney disease Mother      Cancer Father      Breast cancer Sister  58     Social History[1]  Review of Systems   Constitutional:  Negative for chills and fever.   Respiratory:  Negative for shortness of breath.    Cardiovascular:  Negative for chest pain and leg swelling.   Gastrointestinal:  Negative for abdominal pain, diarrhea, nausea and vomiting.   Musculoskeletal:  Positive for arthralgias. Negative for back pain, gait problem, joint swelling, myalgias, neck pain and neck stiffness.   Skin:  Negative for rash and  wound.   Neurological:  Negative for syncope, weakness, numbness and headaches.       Physical Exam     Initial Vitals [02/22/25 0253]   BP Pulse Resp Temp SpO2   138/77 90 18 100 °F (37.8 °C) 100 %      MAP       --         Physical Exam    Nursing note and vitals reviewed.  Constitutional: She appears well-developed and well-nourished. She is not diaphoretic. No distress.   Uncomfortable, nontoxic   HENT:   Head: Normocephalic and atraumatic.   Neck: Neck supple.   Normal range of motion.  Cardiovascular:  Intact distal pulses.           Normal sinus rhythm.  No unilateral leg swelling or calf tenderness, no pretibial edema.   Pulmonary/Chest: Breath sounds normal. No respiratory distress.   Musculoskeletal:      Cervical back: Normal range of motion and neck supple.      Comments: No midline spinal tenderness.    There is soft tissue tenderness to the deltoid region of the right shoulder.  There is limited active range of motion of the right shoulder.  Pain with any attempted passive range of motion of right shoulder.  No upper extremity edema.    Full active range of motion of the left elbow with some discomfort during active range of motion.  No tenderness to left upper extremity.  No upper extremity edema.     Neurological: She is alert and oriented to person, place, and time. GCS score is 15. GCS eye subscore is 4. GCS verbal subscore is 5. GCS motor subscore is 6.   Skin: Skin is warm.   Psychiatric: Thought content normal.   Anxious         ED Course   Procedures  Labs Reviewed   URINALYSIS, REFLEX TO URINE CULTURE - Abnormal       Result Value    Specimen UA Urine, Clean Catch      Color, UA Yellow      Appearance, UA Clear      pH, UA 6.0      Specific Gravity, UA >1.030 (*)     Protein, UA Trace (*)     Glucose, UA 4+ (*)     Ketones, UA Negative      Bilirubin (UA) Negative      Occult Blood UA Negative      Nitrite, UA Negative      Urobilinogen, UA 2.0-3.0 (*)     Leukocytes, UA Negative       Narrative:     Specimen Source->Urine   CBC W/ AUTO DIFFERENTIAL - Abnormal    WBC 12.54      RBC 4.57      Hemoglobin 8.2 (*)     Hematocrit 27.7 (*)     MCV 61 (*)     MCH 17.9 (*)     MCHC 29.6 (*)     RDW 20.3 (*)     Platelets 130 (*)     MPV SEE COMMENT      Immature Granulocytes 0.3      Gran # (ANC) 10.7 (*)     Immature Grans (Abs) 0.04      Lymph # 1.0      Mono # 0.7      Eos # 0.0      Baso # 0.03      nRBC 0      Gran % 85.5 (*)     Lymph % 8.1 (*)     Mono % 5.7      Eosinophil % 0.2      Basophil % 0.2      Differential Method Automated     COMPREHENSIVE METABOLIC PANEL - Abnormal    Sodium 138      Potassium 3.2 (*)     Chloride 103      CO2 21 (*)     Glucose 218 (*)     BUN 9      Creatinine 0.8      Calcium 9.7      Total Protein 8.2      Albumin 3.7      Total Bilirubin 0.6      Alkaline Phosphatase 78      AST 15      ALT 12      eGFR >60      Anion Gap 14     URINALYSIS MICROSCOPIC - Abnormal    RBC, UA 5 (*)     WBC, UA 5      Bacteria Occasional      Yeast, UA None      Squam Epithel, UA 7      Microscopic Comment SEE COMMENT      Narrative:     Specimen Source->Urine   ISTAT PROCEDURE - Abnormal    POC Glucose 224 (*)     POC BUN 7      POC Creatinine 0.5      POC Sodium 139      POC Potassium 3.1 (*)     POC Chloride 101      POC TCO2 (MEASURED) 24      POC Anion Gap 19 (*)     POC Ionized Calcium 1.18      POC Hematocrit 30 (*)     Sample VENOUS      Site Other      Allens Test N/A     MAGNESIUM    Magnesium 2.0     TROPONIN I    Troponin I 0.012     POCT INFLUENZA A/B MOLECULAR    POC Molecular Influenza A Ag Negative      POC Molecular Influenza B Ag Negative       Acceptable Yes     SARS-COV-2 RDRP GENE    POC Rapid COVID Negative       Acceptable Yes       EKG Readings: (Independently Interpreted)   Sinus rhythm, occasional PVC.  Normal TX, normal QT, normal QRS duration.  No right axis deviation.  No convincing ST elevation.  Q-waves noted to inferior  leads, to lateral precordial leads.  Nonspecific ST segment changes.  Appears grossly similar previous dated 08/30/2024.     ECG Results              EKG 12-lead (Final result)        Collection Time Result Time QRS Duration OHS QTC Calculation    02/22/25 04:36:31 02/22/25 14:49:55 92 448                     Final result by Interface, Lab In [facility] (02/22/25 14:50:01)                   Narrative:    Test Reason : M25.511,    Vent. Rate :  75 BPM     Atrial Rate :  75 BPM     P-R Int : 150 ms          QRS Dur :  92 ms      QT Int : 402 ms       P-R-T Axes :  28  36  61 degrees    QTcB Int : 448 ms    Sinus rhythm with occasional Premature ventricular complexes  Otherwise normal ECG  When compared with ECG of 30-Aug-2024 08:23,  Significant changes have occurred  Confirmed by Ryan Grubbs ([id number]) on 2/22/2025 2:49:53 PM    Referred By: AAAREFERRAL SELF           Confirmed By: Ryan Grubbs                                  Imaging Results              X-Ray Chest 1 View (Final result)  Result time 02/22/25 05:30:24      Final result by Meghan Stack MD (02/22/25 05:30:24)                   Impression:      No acute intrathoracic abnormality identified on this single radiographic view of the chest.      Electronically signed by: Meghan Stack MD  Date:    02/22/2025  Time:    05:30               Narrative:    EXAMINATION:  XR CHEST 1 VIEW    CLINICAL HISTORY:  Pain in right shoulder    TECHNIQUE:  Single frontal view of the chest was performed.    COMPARISON:  08/30/2024    FINDINGS:  The cardiomediastinal silhouette is within normal limits. Mediastinal structures are midline.  The lungs appear symmetrically expanded without definite evidence of confluent airspace consolidation, significant volume of pleural fluid or pneumothorax.  Visualized osseous structures are intact.                                    X-Rays:   Independently Interpreted Readings:   Chest X-Ray: Personal interpretation:  Borderline cardiomegaly, no  "convincing effusion, no obvious pneumothorax, no dense peripheral lobar consolidation, questionable poor inspiratory effort.     Medications   potassium bicarbonate disintegrating tablet 50 mEq (50 mEq Oral Given 2/22/25 0438)   ketorolac injection 30 mg (30 mg Intramuscular Given 2/22/25 0604)     Medical Decision Making  Differential diagnosis: Osteoarthritis, rheumatoid arthritis, DVT, overuse injury, atypical ACS, polyarthralgia    Amount and/or Complexity of Data Reviewed  External Data Reviewed: notes.  Labs: ordered. Decision-making details documented in ED Course.  Radiology: ordered and independent interpretation performed. Decision-making details documented in ED Course.     Details: Previous right shoulder x-ray on 10/22/2024 with "mild glenohumeral degenerative osteoarthritis ".  ECG/medicine tests: ordered and independent interpretation performed. Decision-making details documented in ED Course.     Details: No convincing acute ischemic change.  No chest pain.  No shortness of breath.  Negative troponin.  Despite comorbidities, given history of osteoarthritis to the right shoulder, history of previous left elbow pain requiring imaging, recently starting a new job with demands of repetitive movements with her upper extremities, prolonged standing, I do think this is likely musculoskeletal, overuse issue, acute on chronic issues.  I have low suspicion for cardiac ischemia or atypical cardiac process at this time.    Risk  Prescription drug management.               ED Course as of 02/22/25 1954   Sat Feb 22, 2025   0406 No recent A1c on file.  Most recent A1c 12.1% on 08/18/2023. [SM]   0422 Hemoglobin(!): 8.2  Appears near baseline.  No active bleeding.  Compliant with p.o. iron supplement. [SM]   0430 Has complained of her shoulder pain in the past, x-ray with osteoarthritis.  On record review, has also complained of left elbow pain in the remote past--negative x-ray in the past. [SM]   0442 Mild " hyperglycemia.  Only slightly elevated anion gap.  Only slightly decreased CO2.  No polyuria, polydipsia, nausea vomiting, fatigue, epigastric pain, or visual disturbance.  No ketonuria.  DKA felt less likely. [SM]   0528 Repeat temp improved without any intervention in the ED. denies fever, chills, myalgias.  Low suspicion for infectious process.  Referral placed to establish care with a local PCP as well as orthopedic physician.  Suggested avoiding exacerbatory movements, a few days off from work with rest, NSAIDs or Tylenol, p.r.n. muscle relaxer.  Discussed interim return precautions.  Patient comfortable with current plan.  [SM]      ED Course User Index  [SM] Thompson Caro PA-C                           Clinical Impression:  Final diagnoses:  [M25.511] Right shoulder pain (Primary)  [E87.6] Hypokalemia  [M25.522] Left elbow pain  [E11.65] Hyperglycemia due to diabetes mellitus          ED Disposition Condition    Discharge Stable          ED Prescriptions       Medication Sig Dispense Start Date End Date Auth. Provider    methocarbamoL (ROBAXIN) 750 MG Tab Take 2 tablets (1,500 mg total) by mouth 3 (three) times daily as needed (stiffness/discomfort). 30 tablet 2/22/2025 2/27/2025 Thompson Caro PA-C    potassium bicarbonate (K-LYTE) disintegrating tablet Take 1 tablet (25 mEq total) by mouth once daily. for 3 days 3 tablet 2/22/2025 2/25/2025 Thompson Caro PA-C          Follow-up Information       Follow up With Specialties Details Why Contact [facility]  Schedule an appointment as soon as possible for a visit  to schedule an appointment for follow-up with Orthopedics [address] [place] [id number] [phone]    Margarito Farah MD Orthopedic Surgery Schedule an appointment as soon as possible for a visit  to schedule an appointment for followup with Orthopedics [address] [facility] [address]  [facility] [id number] [phone]       Omar Tomas MD Orthopedic Surgery Schedule an appointment as soon as possible for a visit  to schedule an appointment for follow-up [facility] [address]  [address]  [facility] [address]  [phone]      [facility] -  Schedule an appointment as soon as possible for a visit  To establish primary care physician, for reevaluation [address] [place] [address]  [phone]      [facility]  Schedule an appointment as soon as possible for a visit  To establish primary care physician, For reevaluation [address] [facility] [address]  [phone]      [facility] Schedule an appointment as soon as possible for a visit  To establish primary care physician, For reevaluation [address] [facility] [address]  [phone]                   [1]   Social History  Tobacco Use    Smoking status: Never    Smokeless tobacco: Never   Substance Use Topics    Alcohol use: No    Drug use: No        Thompson Caro PA-C  02/22/25 1956

## 2025-02-22 NOTE — DISCHARGE INSTRUCTIONS
Get some good rest.  Ice, heating pad to your shoulder and elbow may help with soreness and pain.  Tylenol, ibuprofen as needed for pain.  Robaxin for continued stiffness/soreness. Be aware, this medication is sedating.  Do not mix with alcohol or any other sedating medications.  Do not drive or operate machinery when taking this medication.  Try to avoid movements which worsen your pain.    Please follow up with a local orthopedic physician use information provided, for re-evaluation of any continued shoulder pain.  Use information provided to follow up establish care with a local primary care provider.    Please return to this ED if worsening pain despite current plan, if you develop chest pain or shortness of breath, if any other problems occur.

## 2025-05-06 NOTE — PROGRESS NOTES
Assessment: 50 y.o. female with right acute shoulder pain     I explained my diagnostic impression and the reasoning behind it in detail, using layman's terms.    Plan:   - MRI R shoulder given history of trauma and age   - Valium for MRI  - PT referral placed   - Return to clinic in 12 weeks. Return sooner if symptoms worsen or fail to improve.    All questions were answered in detail. The patient is in full agreement with the treatment plan and will proceed accordingly.    Chief Complaint   Patient presents with    Right Shoulder - Pain, Injury     Initial visit (5/15/25): Haydee Allen is a 50 y.o. female who presents today complaining of right shoulder pain    Haydee presents with left shoulder pain that began in October of last year after a fall . She tripped and fell on concrete, leading to shoulder swelling. Pain has not improved since the incident. She cannot lay on the affected arm due to pain, only for about 3-4 seconds before having to turn.    A couple of days after the fall, she went to the ER due to persistent swelling. XRs were taken, but no damage was reported. She has tried various treatments including hot patches, OTC arthritis pills, and ibuprofen prescribed by her primary care physician. Recently, her Gabapentin dosage was increased to 600mg daily, which she reports is helping her leg but not her shoulder. She has requested a steroid injection from her primary care physician but was denied.    She works in , which involves lifting. She reports difficulty sleeping due to shoulder pain, only being able to sleep on one side.      Previously attempted treatments:   Activity modification:  not helpful   Heat:  not helpful   NSAIDs: not helpful   Tylenol: not helpful   PT:  not attempted   Injections: not attempted     This patient was seen in consultation at the request of Thompson Caro    This is the extent of the patient's complaints at this time.     Hand dominance: Right      Occupation:        Review of patient's allergies indicates:   Allergen Reactions    Trulicity [dulaglutide] Rash     Physical Exam:   Vitals:    05/15/25 1427   PainSc:   7   PainLoc: Shoulder     General: Patient is alert, awake and oriented to time, place and person. Mood and affect are appropriate.  Patient does not appear to be in any distress, denies any constitutional symptoms and appears stated age.   HEENT: Pupils are equal and round, sclera are not injected. External examination of ears and nose reveals no abnormalities. Cranial nerves II-X are grossly intact  Neck: examination demonstrates painless  active range of motion. Spurling's sign is negative  Skin: no rashes, abrasions or open wounds on the affected extremity   Resp: No respiratory distress or audible wheezing   CV: 2+  pulses, all extremities warm and well perfused   Right Shoulder    Shoulder Range of Motion    Right     Left   (Active/Passive)       Forward Elevation     165/170            165/170  External rotation (arm at side)  50/50             50/50   Internal rotation behind the back  L5             L5     Range of motion is painful     Acromioclavicular joint is not tender  Crossbody test: negative    Neer's positive  Hawkin's positive    Linda's positive  Drop arm negative  Belly press negative    Cuff Strength     Right     Left   Supraspinatus        4/5    5/5  Infraspinatus     5/5    5/5  Subscapularis     5/5    5/5    Deltoid testing            5/5    5/5    Speeds negative  Yergasons negative    Elbow examination demonstrates no tenderness to palpation and has normal range of motion.     ltsi C5-T1  + epl, io, fds, fdp   2+ RP      Imaging:  3 views of the right shoulder:   positive for degenerative changes of the AC joint. The humeral head is well centered on the AP view.  No cortical changes of the greater tuberosity noted. No significant degenerative change of the glenohumeral joint or posterior subluxation of  the humeral head. No acute changes or fracture.      I personally reviewed and interpreted the patient's imaging obtained at outside facility     A note notifying Thompson Caro of my findings was sent via the electronic medical record     This note was created by combination of typed  and M-Modal dictation. Transcription and phonetic errors may be present.  If there are any questions, please contact me.    Current Medications[1]    Past Medical History:   Diagnosis Date    Abdominal pain     RIGHT MID ABD    Anemia     Anemia due to chronic blood loss 6/25/2020    Anxiety     Arthritis     Complex cyst of right ovary 6/17/2019    Diabetes mellitus     Diabetes mellitus, type 2     Encounter for blood transfusion     2    Encounter for well woman exam with routine gynecological exam 5/13/2019    Hypertension     Menorrhagia with regular cycle 6/17/2019    Menorrhagia with regular cycle 6/25/2020    Microcytic anemia 6/25/2020    Obesity     Ovarian cyst     right    Problem involving surgical incision 7/18/2019    Sinus congestion     Thyroid disease        Active Problem List with Overview Notes    Diagnosis Date Noted    Adjustment disorder with depressed mood 03/30/2021    Insomnia due to other mental disorder 03/30/2021    Menorrhagia with regular cycle 06/25/2020    Microcytic anemia 06/25/2020    Anemia due to chronic blood loss 06/25/2020    Right shoulder pain 03/19/2020    Posture abnormality 03/19/2020    Mixed anxiety and depressive disorder 12/19/2019    Menopause syndrome 08/13/2019    Pain localized to upper abdomen 05/13/2019    Gastroesophageal reflux disease without esophagitis 10/31/2017    Obesity 10/30/2017    Sciatica of right side 10/30/2017    Chest pain on breathing 09/21/2017    Hypertension 09/21/2017    Type 2 diabetes mellitus with neurologic complication, without long-term current use of insulin 09/21/2017    Class 2 severe obesity due to excess calories with serious  comorbidity in adult 2017    Diabetic polyneuropathy associated with type 2 diabetes mellitus 2017    Meralgia paresthetica of right side 2017    Anxiety 2017    Hypothyroidism 2017    Iron deficiency anemia 2017       Past Surgical History:   Procedure Laterality Date     SECTION      LAPAROSCOPIC SALPINGO-OOPHORECTOMY Bilateral 7/3/2019    Procedure: SALPINGO-OOPHORECTOMY, LAPAROSCOPIC;  Surgeon: Darin Hale MD;  Location: [facility] OR;  Service: OB/GYN;  Laterality: Bilateral;    LAPAROSCOPIC TOTAL HYSTERECTOMY N/A 7/3/2019    Procedure: HYSTERECTOMY, TOTAL, LAPAROSCOPIC;  Surgeon: Darin Hale MD;  Location: [facility] OR;  Service: OB/GYN;  Laterality: N/A;    OPEN REDUCTION AND INTERNAL FIXATION (ORIF) OF INJURY OF ANKLE Left     THYROIDECTOMY, PARTIAL      TUBAL LIGATION         Social History[2]              [1]   Current Outpatient Medications:     amLODIPine (NORVASC) 10 MG tablet, Take 1 tablet by mouth once daily, Disp: 30 tablet, Rfl: 0    atorvastatin (LIPITOR) 10 MG tablet, Take 1 tablet (10 mg total) by mouth once daily., Disp: 30 tablet, Rfl: 5    blood sugar diagnostic (TRUE METRIX GLUCOSE TEST STRIP MISC), True Metrix Glucose Test Strip  Use twice daily as directed, Disp: , Rfl:     butalbital-acetaminophen-caffeine -40 mg (FIORICET, ESGIC) -40 mg per tablet, Take 1 tablet by mouth every 4 (four) hours as needed., Disp: , Rfl:     chlorhexidine (PERIDEX) 0.12 % solution, Use as directed 15 mLs in the mouth or throat 2 (two) times daily. Swish and spit, Disp: 100 mL, Rfl: 0    ferrous sulfate 325 (65 FE) MG EC tablet, Take 325 mg by mouth 3 (three) times daily with meals., Disp: , Rfl:     flash glucose scanning reader (FREESTYLE TANGELA 14 DAY READER) Misc, Use with freestyle tangela to monitor glucose, Disp: 1 each, Rfl: 0    flash glucose sensor (FREESTYLE TANGELA 2 SENSOR) Kit, 1 Device by Misc.(Non-Drug; Combo Route) route every 14 (fourteen)  "days., Disp: 6 kit, Rfl: 0    hydrOXYzine HCL (ATARAX) 25 MG tablet, Take 1 tablet by mouth 3 times daily as needed., Disp: , Rfl:     levothyroxine (SYNTHROID) 200 MCG tablet, TAKE 1 TABLET BY MOUTH BEFORE BREAKFAST **TAKE  ONE  EXTRA  TABLET  ON  SUNDAYS**, Disp: 30 tablet, Rfl: 0    linaCLOtide (LINZESS) 72 mcg Cap capsule, Take 72 mcg by mouth before breakfast., Disp: , Rfl:     lisinopriL (PRINIVIL,ZESTRIL) 40 MG tablet, Take 1 tablet (40 mg total) by mouth once daily., Disp: 90 tablet, Rfl: 1    metFORMIN (GLUCOPHAGE-XR) 500 MG ER 24hr tablet, Take 1 tablet (500 mg total) by mouth 2 (two) times daily with meals., Disp: 60 tablet, Rfl: 0    pen needle, diabetic (BD CINDY 2ND GEN PEN NEEDLE) 32 gauge x 5/32" Ndle, Inject 1 Box into the skin 2 (two) times a day., Disp: 100 each, Rfl: 2    tiZANidine (ZANAFLEX) 4 MG tablet, Take 4 mg by mouth every 6 (six) hours as needed., Disp: , Rfl:     diazePAM (VALIUM) 5 MG tablet, Take 2 tablets (10 mg total) by mouth once. Take one pill 1 hour prior to MRI. If needed, take second pill 30 minutes later for 1 dose, Disp: 2 tablet, Rfl: 0    docusate sodium (COLACE) 100 MG capsule, Stool Softener 100 mg capsule, Disp: , Rfl:     hydroCHLOROthiazide (HYDRODIURIL) 12.5 MG Tab, Take 1 tablet (12.5 mg total) by mouth once daily., Disp: 90 tablet, Rfl: 1  No current facility-administered medications for this visit.    Facility-Administered Medications Ordered in Other Visits:     lidocaine (PF) 10 mg/ml (1%) injection 2 mg, 0.2 mL, Intradermal, Once, Brenda Hauser MD  [2]   Social History  Socioeconomic History    Marital status: Single   Tobacco Use    Smoking status: Never    Smokeless tobacco: Never   Substance and Sexual Activity    Alcohol use: No    Drug use: No    Sexual activity: Never     Social Drivers of Health     Financial Resource Strain: High Risk (5/12/2025)    Overall Financial Resource Strain (CARDIA)     Difficulty of Paying Living Expenses: Very hard   Food " Insecurity: Food Insecurity Present (5/12/2025)    Hunger Vital Sign     Worried About Running Out of Food in the Last Year: Often true     Ran Out of Food in the Last Year: Often true   Transportation Needs: Unmet Transportation Needs (5/12/2025)    PRAPARE - Transportation     Lack of Transportation (Medical): Yes     Lack of Transportation (Non-Medical): Yes   Physical Activity: Sufficiently Active (5/12/2025)    Exercise Vital Sign     Days of Exercise per Week: 7 days     Minutes of Exercise per Session: 40 min   Stress: Stress Concern Present (5/12/2025)    [employer] - Occupational Stress Questionnaire     Feeling of Stress : Very much   Housing Stability: High Risk (5/12/2025)    Housing Stability Vital Sign     Unable to Pay for Housing in the Last Year: Yes     Number of Times Moved in the Last Year: 3     Homeless in the Last Year: Yes

## 2025-05-15 ENCOUNTER — OFFICE VISIT (OUTPATIENT)
Dept: ORTHOPEDICS | Facility: CLINIC | Age: 50
End: 2025-05-15
Payer: MEDICAID

## 2025-05-15 DIAGNOSIS — M25.511 RIGHT SHOULDER PAIN: ICD-10-CM

## 2025-05-15 DIAGNOSIS — M25.511 ACUTE PAIN OF RIGHT SHOULDER: Primary | ICD-10-CM

## 2025-05-15 PROCEDURE — 99999 PR PBB SHADOW E&M-EST. PATIENT-LVL IV: CPT | Mod: PBBFAC,,, | Performed by: ORTHOPAEDIC SURGERY

## 2025-05-15 PROCEDURE — 99214 OFFICE O/P EST MOD 30 MIN: CPT | Mod: PBBFAC,PN | Performed by: ORTHOPAEDIC SURGERY

## 2025-05-15 RX ORDER — DIAZEPAM 5 MG/1
10 TABLET ORAL ONCE
Qty: 2 TABLET | Refills: 0 | Status: SHIPPED | OUTPATIENT
Start: 2025-05-15 | End: 2025-05-15

## 2025-05-29 ENCOUNTER — TELEPHONE (OUTPATIENT)
Dept: ORTHOPEDICS | Facility: CLINIC | Age: 50
End: 2025-05-29
Payer: MEDICAID

## 2025-05-29 ENCOUNTER — HOSPITAL ENCOUNTER (OUTPATIENT)
Dept: RADIOLOGY | Facility: HOSPITAL | Age: 50
Discharge: HOME | End: 2025-05-29
Attending: ORTHOPAEDIC SURGERY
Payer: MEDICAID

## 2025-05-29 DIAGNOSIS — M25.511 ACUTE PAIN OF RIGHT SHOULDER: ICD-10-CM

## 2025-05-29 RX ORDER — DIAZEPAM 5 MG/1
10 TABLET ORAL ONCE
Qty: 2 TABLET | Refills: 0 | Status: SHIPPED | OUTPATIENT
Start: 2025-05-29 | End: 2025-05-30

## 2025-05-29 NOTE — TELEPHONE ENCOUNTER
Spoke with patient. Cancelled original prescription and sent new prescription over to [facility]. Patient verbalized understanding.     Mel Contreras MS, ATC, OTC  Clinical/Surgical Assistant - Dr. Vika Moya  Orthopedics  Phone: [phone]      ----- Message -----  From: Guevara Calloway  Sent: 5/29/2025   8:41 AM CDT  To: [facility] Staff    Type: General Call Back Name of Caller:pt Reason pt wants to know if her prescription for diazePAM (VALIUM) 5 MG tablet can be sent to [facility] Phone: [phone] has her MRI today Would the patient rather a call back or a response via [facility]? Call Best Call Back Number: [phone] Information:

## 2025-06-04 DIAGNOSIS — M25.519 PAIN IN UNSPECIFIED SHOULDER: Primary | ICD-10-CM

## 2025-07-15 RX ORDER — ATORVASTATIN CALCIUM 10 MG/1
TABLET, FILM COATED ORAL
Qty: 30 TABLET | Refills: 0 | Status: CANCELLED | OUTPATIENT
Start: 2025-07-07

## 2025-07-15 RX ORDER — PEN NEEDLE, DIABETIC 30 GX3/16"
NEEDLE, DISPOSABLE MISCELLANEOUS
Qty: 100 EACH | Refills: 0 | Status: CANCELLED | OUTPATIENT
Start: 2025-07-07

## 2025-07-15 RX ORDER — AMLODIPINE BESYLATE 10 MG/1
TABLET ORAL
Qty: 30 TABLET | Refills: 0 | Status: CANCELLED | OUTPATIENT
Start: 2025-07-07

## 2025-08-28 ENCOUNTER — PATIENT MESSAGE (OUTPATIENT)
Dept: FAMILY MEDICINE | Facility: CLINIC | Age: 50
End: 2025-08-28
Payer: MEDICAID